# Patient Record
Sex: FEMALE | Race: BLACK OR AFRICAN AMERICAN | NOT HISPANIC OR LATINO | Employment: FULL TIME | URBAN - METROPOLITAN AREA
[De-identification: names, ages, dates, MRNs, and addresses within clinical notes are randomized per-mention and may not be internally consistent; named-entity substitution may affect disease eponyms.]

---

## 2017-01-04 ENCOUNTER — TELEPHONE (OUTPATIENT)
Dept: RHEUMATOLOGY | Facility: CLINIC | Age: 52
End: 2017-01-04

## 2017-01-04 NOTE — TELEPHONE ENCOUNTER
Second attempt to contact patient in regards to lab results. Left message to contact office for results.

## 2017-01-04 NOTE — TELEPHONE ENCOUNTER
----- Message from Markell Gonzalez sent at 1/4/2017  9:57 AM CST -----  Contact: Patient  Patient returning call.please call back at [phone]. thanks

## 2017-01-05 ENCOUNTER — TELEPHONE (OUTPATIENT)
Dept: RHEUMATOLOGY | Facility: CLINIC | Age: 52
End: 2017-01-05

## 2017-01-05 NOTE — TELEPHONE ENCOUNTER
Received one lab test result from [facility]. Mail patient other lab orders that were supposed to be done. Patient to get them done before February appointment.

## 2017-01-05 NOTE — TELEPHONE ENCOUNTER
----- Message from Agapito Bal sent at 1/4/2017  4:23 PM CST -----  Contact: same  Unsuccessful call placed to pod.  Patient called in and was returning a call.  Patient call back number is [phone]

## 2017-02-08 ENCOUNTER — OFFICE VISIT (OUTPATIENT)
Dept: RHEUMATOLOGY | Facility: CLINIC | Age: 52
End: 2017-02-08
Payer: COMMERCIAL

## 2017-02-08 VITALS
SYSTOLIC BLOOD PRESSURE: 138 MMHG | DIASTOLIC BLOOD PRESSURE: 80 MMHG | HEART RATE: 84 BPM | WEIGHT: 264.56 LBS | BODY MASS INDEX: 42.52 KG/M2 | HEIGHT: 66 IN

## 2017-02-08 DIAGNOSIS — M02.30 REITER'S DISEASE, REITER'S DISEASE OF UNSPECIFIED SITE: ICD-10-CM

## 2017-02-08 DIAGNOSIS — M35.00 SJOGREN'S SYNDROME: Primary | ICD-10-CM

## 2017-02-08 DIAGNOSIS — M25.572 BILATERAL ANKLE PAIN, UNSPECIFIED CHRONICITY: ICD-10-CM

## 2017-02-08 DIAGNOSIS — G60.3 IDIOPATHIC PROGRESSIVE NEUROPATHY: ICD-10-CM

## 2017-02-08 DIAGNOSIS — M79.672 FOOT PAIN, BILATERAL: ICD-10-CM

## 2017-02-08 DIAGNOSIS — M79.671 FOOT PAIN, BILATERAL: ICD-10-CM

## 2017-02-08 DIAGNOSIS — M25.571 BILATERAL ANKLE PAIN, UNSPECIFIED CHRONICITY: ICD-10-CM

## 2017-02-08 DIAGNOSIS — G47.26 SHIFT WORK SLEEP DISORDER: ICD-10-CM

## 2017-02-08 PROCEDURE — 99214 OFFICE O/P EST MOD 30 MIN: CPT | Mod: 25,S$GLB,, | Performed by: INTERNAL MEDICINE

## 2017-02-08 PROCEDURE — 96372 THER/PROPH/DIAG INJ SC/IM: CPT | Mod: S$GLB,,, | Performed by: INTERNAL MEDICINE

## 2017-02-08 PROCEDURE — 99999 PR PBB SHADOW E&M-EST. PATIENT-LVL III: CPT | Mod: PBBFAC,,, | Performed by: INTERNAL MEDICINE

## 2017-02-08 RX ORDER — METHYLPREDNISOLONE ACETATE 80 MG/ML
160 INJECTION, SUSPENSION INTRA-ARTICULAR; INTRALESIONAL; INTRAMUSCULAR; SOFT TISSUE
Status: COMPLETED | OUTPATIENT
Start: 2017-02-08 | End: 2017-02-08

## 2017-02-08 RX ORDER — MODAFINIL 200 MG/1
200 TABLET ORAL DAILY
Qty: 30 TABLET | Refills: 3 | Status: SHIPPED | OUTPATIENT
Start: 2017-02-08 | End: 2017-03-10

## 2017-02-08 RX ORDER — CYANOCOBALAMIN 1000 UG/ML
1000 INJECTION, SOLUTION INTRAMUSCULAR; SUBCUTANEOUS
Status: COMPLETED | OUTPATIENT
Start: 2017-02-08 | End: 2017-02-08

## 2017-02-08 RX ORDER — HYDROXYCHLOROQUINE SULFATE 200 MG/1
200 TABLET, FILM COATED ORAL 2 TIMES DAILY
COMMUNITY
End: 2017-06-23

## 2017-02-08 RX ORDER — KETOROLAC TROMETHAMINE 30 MG/ML
60 INJECTION, SOLUTION INTRAMUSCULAR; INTRAVENOUS
Status: COMPLETED | OUTPATIENT
Start: 2017-02-08 | End: 2017-02-08

## 2017-02-08 RX ADMIN — CYANOCOBALAMIN 1000 MCG: 1000 INJECTION, SOLUTION INTRAMUSCULAR; SUBCUTANEOUS at 03:02

## 2017-02-08 RX ADMIN — KETOROLAC TROMETHAMINE 60 MG: 30 INJECTION, SOLUTION INTRAMUSCULAR; INTRAVENOUS at 03:02

## 2017-02-08 RX ADMIN — METHYLPREDNISOLONE ACETATE 160 MG: 80 INJECTION, SUSPENSION INTRA-ARTICULAR; INTRALESIONAL; INTRAMUSCULAR; SOFT TISSUE at 03:02

## 2017-02-08 ASSESSMENT — ROUTINE ASSESSMENT OF PATIENT INDEX DATA (RAPID3)
PATIENT GLOBAL ASSESSMENT SCORE: 5
WHEN YOU AWAKENED IN THE MORNING OVER THE LAST WEEK, PLEASE INDICATE THE AMOUNT OF TIME IT TAKES UNTIL YOU ARE AS LIMBER AS YOU WILL BE FOR THE DAY: HOUR
PSYCHOLOGICAL DISTRESS SCORE: 0
AM STIFFNESS SCORE: 1, YES
TOTAL RAPID3 SCORE: 4.44
PAIN SCORE: 5
FATIGUE SCORE: 10
MDHAQ FUNCTION SCORE: 1

## 2017-02-08 NOTE — PROGRESS NOTES
Subjective:       Patient ID: Mack Melton is a 52 y.o. female.    Chief Complaint: Disease Management    HPI Comments: Follow up: she has Sjogren's disease now has fatigue, and numbness hands and feet. Patient complains of arthralgias and myalgias for which has been present for a few years. Pain is located in multiple joints, both shoulder(s), both elbow(s), both wrist(s), both MCP(s): 1st, 2nd, 3rd, 4th and 5th, both PIP(s): 1st, 2nd, 3rd, 4th and 5th, both DIP(s): 1st and 2nd, both hip(s), both knee(s) and both MTP(s): 1st, 2nd, 3rd, 4th and 5th, is described as aching, pulsating, shooting and throbbing, and is constant, moderate .  Associated symptoms include: crepitation, decreased range of motion, edema, effusion, tenderness and warmth.   She notes tingling in her extremities          Sjögren's Syndrome    The disease course has been worsening.     She complains of joint swelling. Associated symptoms include fatigue.             Sjögren's Syndrome        She complains of joint swelling. Associated symptoms include fatigue.         Review of Systems   Constitutional: Positive for activity change, chills and fatigue. Negative for appetite change, diaphoresis and unexpected weight change.   HENT: Negative for congestion, dental problem, ear discharge, ear pain, facial swelling, mouth sores, nosebleeds, postnasal drip, rhinorrhea, sinus pressure, sneezing, sore throat, tinnitus and voice change.    Eyes: Negative for photophobia, pain, discharge, redness and itching.   Respiratory: Negative for apnea, cough, chest tightness, shortness of breath and wheezing.    Cardiovascular: Positive for leg swelling. Negative for chest pain and palpitations.   Gastrointestinal: Positive for abdominal pain. Negative for abdominal distention, constipation, diarrhea, nausea and vomiting.   Endocrine: Negative for cold intolerance, heat intolerance, polydipsia and polyuria.   Genitourinary: Negative for decreased urine volume,  "difficulty urinating, flank pain, frequency, hematuria and urgency.   Musculoskeletal: Positive for arthralgias, back pain, gait problem, joint swelling, neck pain and neck stiffness.   Skin: Negative for pallor, rash and wound.   Allergic/Immunologic: Negative for immunocompromised state.   Neurological: Positive for weakness. Negative for dizziness, tremors and numbness.   Hematological: Negative for adenopathy. Does not bruise/bleed easily.   Psychiatric/Behavioral: Negative for sleep disturbance. The patient is not nervous/anxious.          Objective:     Visit Vitals    /80    Pulse 84    Ht 5' 6" (1.676 m)    Wt 120 kg (264 lb 8.8 oz)    BMI 42.7 kg/m2        Physical Exam   Vitals reviewed.  Constitutional: She is oriented to person, place, and time. She appears distressed.   HENT:   Head: Normocephalic and atraumatic.   Eyes: EOM are normal. Pupils are equal, round, and reactive to light. Right eye exhibits no discharge. Left eye exhibits no discharge.   Neck: Neck supple. No thyromegaly present.   Cardiovascular: Normal rate, regular rhythm and normal heart sounds.  Exam reveals no gallop and no friction rub.    No murmur heard.  Pulmonary/Chest: Breath sounds normal. She has no wheezes. She has no rales. She exhibits no tenderness.   Abdominal: There is no tenderness. There is no rebound and no guarding.       Right Side Rheumatological Exam     Examination finds the elbow normal.    The patient is tender to palpation of the shoulder, wrist, knee, 1st PIP, 1st MCP, 2nd PIP, 2nd MCP, 3rd PIP, 3rd MCP, 4th PIP, 4th MCP, 5th PIP and 5th MCP    She has swelling of the 1st PIP, 1st MCP, 2nd PIP, 2nd MCP, 3rd PIP, 3rd MCP, 4th PIP, 4th MCP, 5th PIP and 5th MCP    Shoulder Exam   Tenderness Location: no tenderness    Range of Motion   Active Abduction: abnormal   Adduction: abnormal  Sensation: normal    Knee Exam   Patellofemoral Crepitus: positive  Effusion: positive  Sensation: normal    Hip Exam "   Tenderness Location: posterior  Sensation: normal    Elbow/Wrist Exam   Tenderness Location: no tenderness  Sensation: normal    Left Side Rheumatological Exam     The patient is tender to palpation of the shoulder, elbow, wrist, knee, 1st PIP, 1st MCP, 2nd PIP, 2nd MCP, 3rd PIP, 3rd MCP, 4th PIP, 4th MCP, 5th PIP and 5th MCP.    She has swelling of the 1st PIP, 1st MCP, 2nd PIP, 2nd MCP, 3rd PIP, 3rd MCP, 4th PIP, 4th MCP, 5th PIP and 5th MCP    Shoulder Exam   Tenderness Location: no tenderness    Range of Motion   Active Abduction: abnormal   Sensation: normal    Knee Exam     Patellofemoral Crepitus: positive  Effusion: positive  Sensation: normal    Hip Exam   Tenderness Location: posterior  Sensation: normal    Elbow/Wrist Exam   Sensation: normal      Back/Neck Exam   General Inspection   Gait: normal         Lymphadenopathy:     She has no cervical adenopathy.   Neurological: She is alert and oriented to person, place, and time. Gait normal.   Skin: Skin is dry. No rash noted. No erythema. There is pallor.     Psychiatric: Mood and affect normal.   Musculoskeletal: She exhibits edema, tenderness and deformity.         cbc, cmp, thyroid panel wnl  Assessment:       1. Sjogren's syndrome    2. Jake's disease, Jake's disease of unspecified site    3. Idiopathic progressive neuropathy    4. Bilateral ankle pain, unspecified chronicity    5. Foot pain, bilateral    6. Shift work sleep disorder            Plan:     Mack was seen today for disease management.    Diagnoses and all orders for this visit:    Sjogren's syndrome  -     X-Ray Foot Complete Bilateral; Future  -     X-Ray Ankle Complete Bilateral; Future  -     X-Ray Foot Complete Bilateral  -     X-Ray Ankle Complete Bilateral  -     modafinil (PROVIGIL) 200 MG Tab; Take 1 tablet (200 mg total) by mouth once daily.  -     CBC auto differential; Future  -     Protein electrophoresis, serum; Future  -     CBC auto differential  -     Protein  electrophoresis, serum  -     methylPREDNISolone acetate injection 160 mg; Inject 2 mLs (160 mg total) into the muscle one time.  -     ketorolac injection 60 mg; Inject 2 mLs (60 mg total) into the muscle one time.  -     cyanocobalamin injection 1,000 mcg; Inject 1 mL (1,000 mcg total) into the muscle one time.  -     Comprehensive metabolic panel; Future  -     CBC auto differential; Future  -     C-reactive protein; Future  -     Sedimentation rate, manual; Future  -     SIENA; Future  -     Anti Sm/RNP Antibody; Future  -     Anti-DNA antibody, double-stranded; Future  -     Anti-scleroderma antibody; Future  -     Anti-Smith antibody; Future  -     Sjogrens syndrome-A extractable nuclear antibody; Future  -     Sjogrens syndrome-B extractable nuclear antibody; Future  -     Comprehensive metabolic panel  -     CBC auto differential  -     C-reactive protein  -     Sedimentation rate, manual  -     SIENA  -     Anti Sm/RNP Antibody  -     Anti-DNA antibody, double-stranded  -     Anti-scleroderma antibody  -     Anti-Smith antibody  -     Sjogrens syndrome-A extractable nuclear antibody  -     Sjogrens syndrome-B extractable nuclear antibody    Jake's disease, Jake's disease of unspecified site  -     X-Ray Foot Complete Bilateral; Future  -     X-Ray Ankle Complete Bilateral; Future  -     X-Ray Foot Complete Bilateral  -     X-Ray Ankle Complete Bilateral  -     modafinil (PROVIGIL) 200 MG Tab; Take 1 tablet (200 mg total) by mouth once daily.  -     CBC auto differential; Future  -     Protein electrophoresis, serum; Future  -     CBC auto differential  -     Protein electrophoresis, serum  -     methylPREDNISolone acetate injection 160 mg; Inject 2 mLs (160 mg total) into the muscle one time.  -     ketorolac injection 60 mg; Inject 2 mLs (60 mg total) into the muscle one time.  -     cyanocobalamin injection 1,000 mcg; Inject 1 mL (1,000 mcg total) into the muscle one time.  -     Comprehensive metabolic  panel; Future  -     CBC auto differential; Future  -     C-reactive protein; Future  -     Sedimentation rate, manual; Future  -     SIENA; Future  -     Anti Sm/RNP Antibody; Future  -     Anti-DNA antibody, double-stranded; Future  -     Anti-scleroderma antibody; Future  -     Anti-Smith antibody; Future  -     Sjogrens syndrome-A extractable nuclear antibody; Future  -     Sjogrens syndrome-B extractable nuclear antibody; Future  -     Comprehensive metabolic panel  -     CBC auto differential  -     C-reactive protein  -     Sedimentation rate, manual  -     SIENA  -     Anti Sm/RNP Antibody  -     Anti-DNA antibody, double-stranded  -     Anti-scleroderma antibody  -     Anti-Smith antibody  -     Sjogrens syndrome-A extractable nuclear antibody  -     Sjogrens syndrome-B extractable nuclear antibody    Idiopathic progressive neuropathy  -     X-Ray Foot Complete Bilateral; Future  -     X-Ray Ankle Complete Bilateral; Future  -     X-Ray Foot Complete Bilateral  -     X-Ray Ankle Complete Bilateral  -     modafinil (PROVIGIL) 200 MG Tab; Take 1 tablet (200 mg total) by mouth once daily.  -     CBC auto differential; Future  -     Protein electrophoresis, serum; Future  -     CBC auto differential  -     Protein electrophoresis, serum  -     methylPREDNISolone acetate injection 160 mg; Inject 2 mLs (160 mg total) into the muscle one time.  -     ketorolac injection 60 mg; Inject 2 mLs (60 mg total) into the muscle one time.  -     cyanocobalamin injection 1,000 mcg; Inject 1 mL (1,000 mcg total) into the muscle one time.  -     Comprehensive metabolic panel; Future  -     CBC auto differential; Future  -     C-reactive protein; Future  -     Sedimentation rate, manual; Future  -     SIENA; Future  -     Anti Sm/RNP Antibody; Future  -     Anti-DNA antibody, double-stranded; Future  -     Anti-scleroderma antibody; Future  -     Anti-Smith antibody; Future  -     Sjogrens syndrome-A extractable nuclear antibody;  Future  -     Sjogrens syndrome-B extractable nuclear antibody; Future  -     Comprehensive metabolic panel  -     CBC auto differential  -     C-reactive protein  -     Sedimentation rate, manual  -     SIENA  -     Anti Sm/RNP Antibody  -     Anti-DNA antibody, double-stranded  -     Anti-scleroderma antibody  -     Anti-Smith antibody  -     Sjogrens syndrome-A extractable nuclear antibody  -     Sjogrens syndrome-B extractable nuclear antibody    Bilateral ankle pain, unspecified chronicity  -     X-Ray Foot Complete Bilateral; Future  -     X-Ray Ankle Complete Bilateral; Future  -     X-Ray Foot Complete Bilateral  -     X-Ray Ankle Complete Bilateral  -     modafinil (PROVIGIL) 200 MG Tab; Take 1 tablet (200 mg total) by mouth once daily.  -     CBC auto differential; Future  -     Protein electrophoresis, serum; Future  -     CBC auto differential  -     Protein electrophoresis, serum  -     methylPREDNISolone acetate injection 160 mg; Inject 2 mLs (160 mg total) into the muscle one time.  -     ketorolac injection 60 mg; Inject 2 mLs (60 mg total) into the muscle one time.  -     cyanocobalamin injection 1,000 mcg; Inject 1 mL (1,000 mcg total) into the muscle one time.  -     Comprehensive metabolic panel; Future  -     CBC auto differential; Future  -     C-reactive protein; Future  -     Sedimentation rate, manual; Future  -     SIENA; Future  -     Anti Sm/RNP Antibody; Future  -     Anti-DNA antibody, double-stranded; Future  -     Anti-scleroderma antibody; Future  -     Anti-Smith antibody; Future  -     Sjogrens syndrome-A extractable nuclear antibody; Future  -     Sjogrens syndrome-B extractable nuclear antibody; Future  -     Comprehensive metabolic panel  -     CBC auto differential  -     C-reactive protein  -     Sedimentation rate, manual  -     SIENA  -     Anti Sm/RNP Antibody  -     Anti-DNA antibody, double-stranded  -     Anti-scleroderma antibody  -     Anti-Smith antibody  -     Sjogrens  syndrome-A extractable nuclear antibody  -     Sjogrens syndrome-B extractable nuclear antibody    Foot pain, bilateral  -     X-Ray Foot Complete Bilateral; Future  -     X-Ray Ankle Complete Bilateral; Future  -     X-Ray Foot Complete Bilateral  -     X-Ray Ankle Complete Bilateral  -     modafinil (PROVIGIL) 200 MG Tab; Take 1 tablet (200 mg total) by mouth once daily.  -     CBC auto differential; Future  -     Protein electrophoresis, serum; Future  -     CBC auto differential  -     Protein electrophoresis, serum  -     methylPREDNISolone acetate injection 160 mg; Inject 2 mLs (160 mg total) into the muscle one time.  -     ketorolac injection 60 mg; Inject 2 mLs (60 mg total) into the muscle one time.  -     cyanocobalamin injection 1,000 mcg; Inject 1 mL (1,000 mcg total) into the muscle one time.  -     Comprehensive metabolic panel; Future  -     CBC auto differential; Future  -     C-reactive protein; Future  -     Sedimentation rate, manual; Future  -     SIENA; Future  -     Anti Sm/RNP Antibody; Future  -     Anti-DNA antibody, double-stranded; Future  -     Anti-scleroderma antibody; Future  -     Anti-Smith antibody; Future  -     Sjogrens syndrome-A extractable nuclear antibody; Future  -     Sjogrens syndrome-B extractable nuclear antibody; Future  -     Comprehensive metabolic panel  -     CBC auto differential  -     C-reactive protein  -     Sedimentation rate, manual  -     SIENA  -     Anti Sm/RNP Antibody  -     Anti-DNA antibody, double-stranded  -     Anti-scleroderma antibody  -     Anti-Smith antibody  -     Sjogrens syndrome-A extractable nuclear antibody  -     Sjogrens syndrome-B extractable nuclear antibody    Shift work sleep disorder  -     modafinil (PROVIGIL) 200 MG Tab; Take 1 tablet (200 mg total) by mouth once daily.  -     CBC auto differential; Future  -     Protein electrophoresis, serum; Future  -     CBC auto differential  -     Protein electrophoresis, serum  -      methylPREDNISolone acetate injection 160 mg; Inject 2 mLs (160 mg total) into the muscle one time.  -     ketorolac injection 60 mg; Inject 2 mLs (60 mg total) into the muscle one time.  -     cyanocobalamin injection 1,000 mcg; Inject 1 mL (1,000 mcg total) into the muscle one time.  -     Comprehensive metabolic panel; Future  -     CBC auto differential; Future  -     C-reactive protein; Future  -     Sedimentation rate, manual; Future  -     SIENA; Future  -     Anti Sm/RNP Antibody; Future  -     Anti-DNA antibody, double-stranded; Future  -     Anti-scleroderma antibody; Future  -     Anti-Smith antibody; Future  -     Sjogrens syndrome-A extractable nuclear antibody; Future  -     Sjogrens syndrome-B extractable nuclear antibody; Future  -     Comprehensive metabolic panel  -     CBC auto differential  -     C-reactive protein  -     Sedimentation rate, manual  -     SIENA  -     Anti Sm/RNP Antibody  -     Anti-DNA antibody, double-stranded  -     Anti-scleroderma antibody  -     Anti-Smith antibody  -     Sjogrens syndrome-A extractable nuclear antibody  -     Sjogrens syndrome-B extractable nuclear antibody

## 2017-02-08 NOTE — MR AVS SNAPSHOT
[facility]  [address]  [facility] [address]  Phone: [phone]  Fax: [phone]                  Bridal Walls   2017 3:30 PM   Office Visit    Description:  Female : 1965   Provider:  Rodo Harding MD   Department:  [facility] - Rhe[facility]ology           Reason for Visit     Disease Management           Diagnoses this Visit        Comments    Sjogren's syndrome    -  Primary     Jake's disease, Jake's disease of unspecified site         Idiopathic progressive neuropathy         Bilateral ankle pain, unspecified chronicity         Foot pain, bilateral         Shift work sleep disorder                To Do List           Future Appointments        Provider Department Dept Phone    2017 4:30 PM Rodo Harding MD [facility] [phone]      Goals (5 Years of Data)     None      Follow-Up and Disposition     Return in about 4 months (around 2017).       These Medications        Disp Refills Start End    modafinil (PROVIGIL) 200 MG Tab 30 tablet 3 2017 3/10/2017    Take 1 tablet (200 mg total) by mouth once daily. - Oral    Pharmacy: [id number] - [place], [place] - [address] #: [phone]         [facility]     [facility] Nurse Care Line -  Assistance  Registered nurses in the [facility] provide clinical advisement, health education, appointment booking, and other advisory services.  Call for this free service at [phone].             Medications           Message regarding Medications     Verify the changes and/or additions to your medication regime listed below are the same as discussed with your clinician today.  If any of these changes or additions are incorrect, please notify your healthcare provider.        START taking these NEW medications        Refills    modafinil (PROVIGIL) 200 MG Tab 3    Sig: Take 1 tablet (200 mg total) by mouth once daily.    Class: Normal    Route: Oral      These  medications were administered today        Dose Freq    methylPREDNISolone acetate injection 160 mg 160 mg Clinic/HOD 1 time    Sig: Inject 2 mLs (160 mg total) into the muscle one time.    Class: Normal    Route: Intramuscular    ketorolac injection 60 mg 60 mg Clinic/HOD 1 time    Sig: Inject 2 mLs (60 mg total) into the muscle one time.    Class: Normal    Route: Intramuscular    cyanocobalamin injection 1,000 mcg 1,000 mcg Clinic/HOD 1 time    Sig: Inject 1 mL (1,000 mcg total) into the muscle one time.    Class: Normal    Route: Intramuscular           Verify that the below list of medications is an accurate representation of the medications you are currently taking.  If none reported, the list may be blank. If incorrect, please contact your healthcare provider. Carry this list with you in case of emergency.           Current Medications     CALCIUM CARBONATE-VITAMIN D3 ORAL Take by mouth.    cholecalciferol, vitamin D3, 5,000 unit Tab Take by mouth.    cyanocobalamin (VITAMIN B-12) 1,000 mcg/mL injection Inject 1 mL (1,000 mcg total) into the skin once a week.    esomeprazole (NEXIUM) 40 MG capsule Take 40 mg by mouth.    estrogen, conjugated,-medroxyprogesterone (PREMPRO) 0.625-5 mg per tablet Take 1 tablet by mouth.    fish oil-omega-3 fatty acids 300-1,000 mg capsule Take 500 mg by mouth 2 (two) times daily.    folic acid (FOLVITE) 1 MG tablet Take 1 mg by mouth.    hydroxychloroquine (PLAQUENIL) 200 mg tablet Take 200 mg by mouth 2 (two) times daily.    liothyronine (CYTOMEL) 5 MCG Tab TAKE ONE TABLET BY MOUTH TWICE DAILY    metformin (GLUCOPHAGE-XR) 500 MG 24 hr tablet Take 500 mg by mouth.    piroxicam (FELDENE) 20 MG capsule Take 1 capsule (20 mg total) by mouth once daily.    temazepam (RESTORIL) 15 mg Cap Take 15 mg by mouth.    terazosin (HYTRIN) 2 MG capsule Take 2 mg by mouth.    tizanidine (ZANAFLEX) 4 MG tablet Take 1 tablet (4 mg total) by mouth every 8 (eight) hours.    zolpidem (AMBIEN) 10 mg  "Tab Take 10 mg by mouth.    levothyroxine (SYNTHROID) 137 MCG Tab tablet Take 137 mcg by mouth.    modafinil (PROVIGIL) 200 MG Tab Take 1 tablet (200 mg total) by mouth once daily.           Clinical Reference Information           Your Vitals Were     BP Pulse Height Weight BMI    138/80 84 5' 6" (1.676 m) 120 kg (264 lb 8.8 oz) 42.7 kg/m2      Blood Pressure          Most Recent Value    BP  138/80      Allergies as of 2/8/2017     No Known Allergies      Immunizations Administered on Date of Encounter - 2/8/2017     None      Orders Placed During Today's Visit      Normal Orders This Visit    SIENA     Anti Sm/RNP Antibody     Anti-DNA antibody, double-stranded     Anti-scleroderma antibody     Anti-Day antibody     C-reactive protein     CBC auto differential     CBC auto differential     Comprehensive metabolic panel     Protein electrophoresis, serum     Sedimentation rate, manual     Sjogrens syndrome-A extractable nuclear antibody     Sjogrens syndrome-B extractable nuclear antibody     X-Ray Ankle Complete Bilateral     X-Ray Foot Complete Bilateral     Future Labs/Procedures Expected by Expires    SIENA  2/8/2017 4/9/2018    Anti Sm/RNP Antibody  2/8/2017 4/9/2018    Anti-DNA antibody, double-stranded  2/8/2017 4/9/2018    Anti-scleroderma antibody  2/8/2017 4/9/2018    Anti-Smith antibody  2/8/2017 4/9/2018    C-reactive protein  2/8/2017 4/9/2018    CBC auto differential  2/8/2017 4/9/2018    CBC auto differential  2/8/2017 4/9/2018    Comprehensive metabolic panel  2/8/2017 4/9/2018    Protein electrophoresis, serum  2/8/2017 4/9/2018    Sedimentation rate, manual  2/8/2017 4/9/2018    Sjogrens syndrome-A extractable nuclear antibody  2/8/2017 4/9/2018    Sjogrens syndrome-B extractable nuclear antibody  2/8/2017 4/9/2018    X-Ray Ankle Complete Bilateral  2/8/2017 2/8/2018    X-Ray Foot Complete Bilateral  2/8/2017 2/8/2018      Language Assistance Services     ATTENTION: Language assistance services are " available, free of charge. Please call [phone].      ATENCIÓN: Si habla jovani, tiene a alfred disposición servicios gratuitos de asistencia lingüística. Llame al [phone].     CHÚ Ý: N?u b?n nói Ti?ng Vi?t, có các d?ch v? h? tr? ngôn ng? mi?n phí dành cho b?n. G?i s? [phone].         [facility] complies with applicable Federal civil rights laws and does not discriminate on the basis of race, color, national origin, age, disability, or sex.

## 2017-02-13 PROBLEM — G60.3 IDIOPATHIC PROGRESSIVE NEUROPATHY: Status: ACTIVE | Noted: 2017-02-13

## 2017-02-13 PROBLEM — M35.00 SJOGREN'S SYNDROME: Status: ACTIVE | Noted: 2017-02-13

## 2017-05-26 ENCOUNTER — TELEPHONE (OUTPATIENT)
Dept: RHEUMATOLOGY | Facility: CLINIC | Age: 52
End: 2017-05-26

## 2017-06-23 ENCOUNTER — OFFICE VISIT (OUTPATIENT)
Dept: RHEUMATOLOGY | Facility: CLINIC | Age: 52
End: 2017-06-23
Payer: COMMERCIAL

## 2017-06-23 VITALS
WEIGHT: 264 LBS | HEART RATE: 65 BPM | SYSTOLIC BLOOD PRESSURE: 174 MMHG | HEIGHT: 66 IN | BODY MASS INDEX: 42.43 KG/M2 | DIASTOLIC BLOOD PRESSURE: 83 MMHG

## 2017-06-23 DIAGNOSIS — D69.6 THROMBOCYTOPENIA: ICD-10-CM

## 2017-06-23 DIAGNOSIS — M35.00 SJOGREN'S SYNDROME, WITH UNSPECIFIED ORGAN INVOLVEMENT: Primary | ICD-10-CM

## 2017-06-23 DIAGNOSIS — G60.3 IDIOPATHIC PROGRESSIVE NEUROPATHY: ICD-10-CM

## 2017-06-23 DIAGNOSIS — M02.30 REITER'S DISEASE, REITER'S DISEASE OF UNSPECIFIED SITE: ICD-10-CM

## 2017-06-23 PROCEDURE — 96372 THER/PROPH/DIAG INJ SC/IM: CPT | Mod: S$GLB,,, | Performed by: INTERNAL MEDICINE

## 2017-06-23 PROCEDURE — 99999 PR PBB SHADOW E&M-EST. PATIENT-LVL II: CPT | Mod: PBBFAC,,, | Performed by: INTERNAL MEDICINE

## 2017-06-23 PROCEDURE — 99214 OFFICE O/P EST MOD 30 MIN: CPT | Mod: 25,S$GLB,, | Performed by: INTERNAL MEDICINE

## 2017-06-23 RX ORDER — HYDROXYCHLOROQUINE SULFATE 200 MG/1
200 TABLET, FILM COATED ORAL 2 TIMES DAILY
Qty: 60 TABLET | Refills: 11 | Status: SHIPPED | OUTPATIENT
Start: 2017-06-23 | End: 2018-04-26 | Stop reason: SDUPTHER

## 2017-06-23 RX ORDER — DEXTROAMPHETAMINE SACCHARATE, AMPHETAMINE ASPARTATE, DEXTROAMPHETAMINE SULFATE AND AMPHETAMINE SULFATE 5; 5; 5; 5 MG/1; MG/1; MG/1; MG/1
TABLET ORAL
Refills: 0 | COMMUNITY
Start: 2017-05-31 | End: 2019-08-20

## 2017-06-23 RX ORDER — METHYLPREDNISOLONE ACETATE 80 MG/ML
160 INJECTION, SUSPENSION INTRA-ARTICULAR; INTRALESIONAL; INTRAMUSCULAR; SOFT TISSUE
Status: COMPLETED | OUTPATIENT
Start: 2017-06-23 | End: 2017-06-23

## 2017-06-23 RX ORDER — TIZANIDINE 4 MG/1
4 TABLET ORAL EVERY 8 HOURS
Qty: 90 TABLET | Refills: 5 | Status: SHIPPED | OUTPATIENT
Start: 2017-06-23 | End: 2017-10-27 | Stop reason: SDUPTHER

## 2017-06-23 RX ORDER — KETOROLAC TROMETHAMINE 30 MG/ML
60 INJECTION, SOLUTION INTRAMUSCULAR; INTRAVENOUS
Status: COMPLETED | OUTPATIENT
Start: 2017-06-23 | End: 2017-06-23

## 2017-06-23 RX ORDER — CYANOCOBALAMIN 1000 UG/ML
1000 INJECTION, SOLUTION INTRAMUSCULAR; SUBCUTANEOUS
Status: COMPLETED | OUTPATIENT
Start: 2017-06-23 | End: 2017-06-23

## 2017-06-23 RX ORDER — MODAFINIL 200 MG/1
200 TABLET ORAL DAILY
COMMUNITY
End: 2017-09-22 | Stop reason: SDUPTHER

## 2017-06-23 RX ADMIN — CYANOCOBALAMIN 1000 MCG: 1000 INJECTION, SOLUTION INTRAMUSCULAR; SUBCUTANEOUS at 06:06

## 2017-06-23 RX ADMIN — METHYLPREDNISOLONE ACETATE 160 MG: 80 INJECTION, SUSPENSION INTRA-ARTICULAR; INTRALESIONAL; INTRAMUSCULAR; SOFT TISSUE at 06:06

## 2017-06-23 RX ADMIN — KETOROLAC TROMETHAMINE 60 MG: 30 INJECTION, SOLUTION INTRAMUSCULAR; INTRAVENOUS at 06:06

## 2017-06-23 NOTE — PROGRESS NOTES
Administered 1 cc Vitamin B12 1000mcg/cc to left ventrogluteal. Pt tolerated well. No acute reaction noted to site. Pt instructed on S/S to report. Advised patient to wait in lobby 15 minutes after receiving injection to monitor for any reactions. Pt verbalized understanding.     Lot: [id number]  Exp: [id number]  Administered 2 cc DepoMedrol 80mg/cc  to left ventrogluteal. Pt tolerated well. No acute reaction noted to site. Pt instructed on S/S to report. Advised patient to wait in lobby 15 minutes after receiving injection to monitor for any reactions..  Pt verbalized understanding.     Lot: [id number]  Exp: 06/2018  Administered 2 cc  Toradol 30mg/cc  to right ventrogluteal. Pt tolerated well. No acute reaction noted to site. Pt instructed on S/S to report. Advised patient to wait in lobby 15 minutes after receiving injection to monitor for any reactions. Pt verbalized understanding.     Lot: [id number]  Exp: [id number]

## 2017-06-23 NOTE — PROGRESS NOTES
Subjective:       Patient ID: Mack Melton is a 52 y.o. female.    Chief Complaint: 4 month follow up    Follow up:  sjogren's off plaquenil due to pyelonephritis and was admitted and platelets 45, and now hurting. Patient complains of arthralgias and myalgias for which has been present for a few years. Pain is located in multiple joints, both shoulder(s), both elbow(s), both wrist(s), both MCP(s): 1st, 2nd, 3rd, 4th and 5th, both PIP(s): 1st, 2nd, 3rd, 4th and 5th, both DIP(s): 1st and 2nd, both hip(s), both knee(s) and both MTP(s): 1st, 2nd, 3rd, 4th and 5th, is described as aching, pulsating, shooting and throbbing, and is constant, moderate .       Review of Systems   Constitutional: Positive for activity change and chills. Negative for appetite change, diaphoresis and unexpected weight change.   HENT: Negative for congestion, dental problem, ear discharge, ear pain, facial swelling, mouth sores, nosebleeds, postnasal drip, rhinorrhea, sinus pressure, sneezing, sore throat, tinnitus and voice change.    Eyes: Negative for photophobia, pain, discharge, redness and itching.   Respiratory: Negative for apnea, cough, chest tightness, shortness of breath and wheezing.    Cardiovascular: Positive for leg swelling. Negative for chest pain and palpitations.   Gastrointestinal: Positive for abdominal pain. Negative for abdominal distention, constipation, diarrhea, nausea and vomiting.   Endocrine: Negative for cold intolerance, heat intolerance, polydipsia and polyuria.   Genitourinary: Negative for decreased urine volume, difficulty urinating, flank pain, frequency, hematuria and urgency.   Musculoskeletal: Positive for arthralgias, back pain, gait problem, neck pain and neck stiffness.   Skin: Negative for pallor, rash and wound.   Allergic/Immunologic: Negative for immunocompromised state.   Neurological: Positive for weakness. Negative for dizziness, tremors and numbness.   Hematological: Negative for adenopathy.  "Does not bruise/bleed easily.   Psychiatric/Behavioral: Negative for sleep disturbance. The patient is not nervous/anxious.          Objective:     BP (!) 174/83   Pulse 65   Ht 5' 6" (1.676 m)   Wt 119.7 kg (264 lb)   BMI 42.61 kg/m²      Physical Exam   Vitals reviewed.  Constitutional: She is oriented to person, place, and time. No distress.   HENT:   Head: Normocephalic and atraumatic.   Eyes: EOM are normal. Pupils are equal, round, and reactive to light. Right eye exhibits no discharge. Left eye exhibits no discharge.   Neck: Neck supple. No thyromegaly present.   Cardiovascular: Normal rate, regular rhythm and normal heart sounds.  Exam reveals no gallop and no friction rub.    No murmur heard.  Pulmonary/Chest: Breath sounds normal. She has no wheezes. She has no rales. She exhibits no tenderness.   Abdominal: There is no tenderness. There is no rebound and no guarding.       Right Side Rheumatological Exam     Examination finds the elbow normal.    The patient is tender to palpation of the shoulder, wrist, knee, 1st PIP, 1st MCP, 2nd PIP, 2nd MCP, 3rd PIP, 3rd MCP, 4th PIP, 4th MCP, 5th PIP and 5th MCP    She has swelling of the 1st PIP, 1st MCP, 2nd PIP, 2nd MCP, 3rd PIP, 3rd MCP, 4th PIP, 4th MCP, 5th PIP and 5th MCP    Shoulder Exam   Tenderness Location: no tenderness    Range of Motion   Active Abduction: abnormal   Adduction: abnormal  Sensation: normal    Knee Exam   Patellofemoral Crepitus: positive  Effusion: positive  Sensation: normal    Hip Exam   Tenderness Location: posterior  Sensation: normal    Elbow/Wrist Exam   Tenderness Location: no tenderness  Sensation: normal    Left Side Rheumatological Exam     The patient is tender to palpation of the shoulder, elbow, wrist, knee, 1st PIP, 1st MCP, 2nd PIP, 2nd MCP, 3rd PIP, 3rd MCP, 4th PIP, 4th MCP, 5th PIP and 5th MCP.    She has swelling of the 1st PIP, 1st MCP, 2nd PIP, 2nd MCP, 3rd PIP, 3rd MCP, 4th PIP, 4th MCP, 5th PIP and 5th " MCP    Shoulder Exam   Tenderness Location: no tenderness    Range of Motion   Active Abduction: abnormal   Sensation: normal    Knee Exam     Patellofemoral Crepitus: positive  Effusion: positive  Sensation: normal    Hip Exam   Tenderness Location: posterior  Sensation: normal    Elbow/Wrist Exam   Sensation: normal      Back/Neck Exam   General Inspection   Gait: normal         Lymphadenopathy:     She has no cervical adenopathy.   Neurological: She is alert and oriented to person, place, and time. Gait normal.   Skin: Skin is dry. No rash noted. No erythema. There is pallor.     Psychiatric: Mood and affect normal.   Musculoskeletal: She exhibits tenderness and deformity.         wbc 10.3, platlets 143, esr 63 , crp .82  Assessment:       1. Sjogren's syndrome, with unspecified organ involvement    2. Thrombocytopenia    3. Jake's disease, Jake's disease of unspecified site    4. Idiopathic progressive neuropathy            Plan:     Mack was seen today for 4 month follow up.    Diagnoses and all orders for this visit:    Sjogren's syndrome, with unspecified organ involvement  -     tizanidine (ZANAFLEX) 4 MG tablet; Take 1 tablet (4 mg total) by mouth every 8 (eight) hours.  -     hydroxychloroquine (PLAQUENIL) 200 mg tablet; Take 1 tablet (200 mg total) by mouth 2 (two) times daily.  -     CBC auto differential; Future  -     CBC auto differential  -     CBC auto differential; Future  -     Comprehensive metabolic panel; Future  -     C-reactive protein; Future  -     Sedimentation rate, manual; Future  -     CBC auto differential  -     Comprehensive metabolic panel  -     C-reactive protein  -     Sedimentation rate, manual  -     methylPREDNISolone acetate injection 160 mg; Inject 2 mLs (160 mg total) into the muscle one time.  -     ketorolac injection 60 mg; Inject 2 mLs (60 mg total) into the muscle one time.  -     cyanocobalamin injection 1,000 mcg; Inject 1 mL (1,000 mcg total) into the muscle  one time.  -     Sjogrens syndrome-A extractable nuclear antibody; Future  -     Sjogrens syndrome-B extractable nuclear antibody; Future  -     Sjogrens syndrome-A extractable nuclear antibody  -     Sjogrens syndrome-B extractable nuclear antibody    Thrombocytopenia  -     tizanidine (ZANAFLEX) 4 MG tablet; Take 1 tablet (4 mg total) by mouth every 8 (eight) hours.  -     hydroxychloroquine (PLAQUENIL) 200 mg tablet; Take 1 tablet (200 mg total) by mouth 2 (two) times daily.  -     CBC auto differential; Future  -     CBC auto differential  -     CBC auto differential; Future  -     Comprehensive metabolic panel; Future  -     C-reactive protein; Future  -     Sedimentation rate, manual; Future  -     CBC auto differential  -     Comprehensive metabolic panel  -     C-reactive protein  -     Sedimentation rate, manual  -     methylPREDNISolone acetate injection 160 mg; Inject 2 mLs (160 mg total) into the muscle one time.  -     ketorolac injection 60 mg; Inject 2 mLs (60 mg total) into the muscle one time.  -     cyanocobalamin injection 1,000 mcg; Inject 1 mL (1,000 mcg total) into the muscle one time.  -     Sjogrens syndrome-A extractable nuclear antibody; Future  -     Sjogrens syndrome-B extractable nuclear antibody; Future  -     Sjogrens syndrome-A extractable nuclear antibody  -     Sjogrens syndrome-B extractable nuclear antibody    Jake's disease, Jake's disease of unspecified site  -     tizanidine (ZANAFLEX) 4 MG tablet; Take 1 tablet (4 mg total) by mouth every 8 (eight) hours.  -     hydroxychloroquine (PLAQUENIL) 200 mg tablet; Take 1 tablet (200 mg total) by mouth 2 (two) times daily.  -     CBC auto differential; Future  -     CBC auto differential  -     CBC auto differential; Future  -     Comprehensive metabolic panel; Future  -     C-reactive protein; Future  -     Sedimentation rate, manual; Future  -     CBC auto differential  -     Comprehensive metabolic panel  -     C-reactive  protein  -     Sedimentation rate, manual  -     methylPREDNISolone acetate injection 160 mg; Inject 2 mLs (160 mg total) into the muscle one time.  -     ketorolac injection 60 mg; Inject 2 mLs (60 mg total) into the muscle one time.  -     cyanocobalamin injection 1,000 mcg; Inject 1 mL (1,000 mcg total) into the muscle one time.  -     Sjogrens syndrome-A extractable nuclear antibody; Future  -     Sjogrens syndrome-B extractable nuclear antibody; Future  -     Sjogrens syndrome-A extractable nuclear antibody  -     Sjogrens syndrome-B extractable nuclear antibody    Idiopathic progressive neuropathy  Comments:  arms and thighs and legs  Orders:  -     tizanidine (ZANAFLEX) 4 MG tablet; Take 1 tablet (4 mg total) by mouth every 8 (eight) hours.  -     hydroxychloroquine (PLAQUENIL) 200 mg tablet; Take 1 tablet (200 mg total) by mouth 2 (two) times daily.  -     CBC auto differential; Future  -     CBC auto differential  -     methylPREDNISolone acetate injection 160 mg; Inject 2 mLs (160 mg total) into the muscle one time.  -     ketorolac injection 60 mg; Inject 2 mLs (60 mg total) into the muscle one time.  -     cyanocobalamin injection 1,000 mcg; Inject 1 mL (1,000 mcg total) into the muscle one time.  -     Sjogrens syndrome-A extractable nuclear antibody; Future  -     Sjogrens syndrome-B extractable nuclear antibody; Future  -     Sjogrens syndrome-A extractable nuclear antibody  -     Sjogrens syndrome-B extractable nuclear antibody

## 2017-09-20 RX ORDER — MODAFINIL 200 MG/1
TABLET ORAL
Qty: 30 TABLET | Refills: 2 | Status: CANCELLED | OUTPATIENT
Start: 2017-09-20

## 2017-09-25 RX ORDER — MODAFINIL 200 MG/1
200 TABLET ORAL DAILY
Qty: 30 TABLET | Refills: 3 | Status: SHIPPED | OUTPATIENT
Start: 2017-09-25 | End: 2017-11-01 | Stop reason: SDUPTHER

## 2017-09-26 DIAGNOSIS — M02.30 REITER'S DISEASE, REITER'S DISEASE OF UNSPECIFIED SITE: ICD-10-CM

## 2017-09-26 DIAGNOSIS — M35.00 SJOGRENS SYNDROME: ICD-10-CM

## 2017-09-26 DIAGNOSIS — G60.3 IDIOPATHIC PROGRESSIVE NEUROPATHY: ICD-10-CM

## 2017-09-26 DIAGNOSIS — M35.00 SJOGREN'S SYNDROME: ICD-10-CM

## 2017-09-26 RX ORDER — CYANOCOBALAMIN 1000 UG/ML
INJECTION, SOLUTION INTRAMUSCULAR; SUBCUTANEOUS
Qty: 30 ML | Refills: 5 | Status: SHIPPED | OUTPATIENT
Start: 2017-09-26 | End: 2018-09-13 | Stop reason: SDUPTHER

## 2017-10-27 DIAGNOSIS — M35.00 SJOGREN'S SYNDROME, WITH UNSPECIFIED ORGAN INVOLVEMENT: ICD-10-CM

## 2017-10-27 DIAGNOSIS — G60.3 IDIOPATHIC PROGRESSIVE NEUROPATHY: ICD-10-CM

## 2017-10-27 DIAGNOSIS — M02.30 REITER'S DISEASE, REITER'S DISEASE OF UNSPECIFIED SITE: ICD-10-CM

## 2017-10-27 DIAGNOSIS — D69.6 THROMBOCYTOPENIA: ICD-10-CM

## 2017-10-29 RX ORDER — TIZANIDINE 4 MG/1
TABLET ORAL
Qty: 90 TABLET | Refills: 4 | Status: SHIPPED | OUTPATIENT
Start: 2017-10-29 | End: 2018-09-13 | Stop reason: SDUPTHER

## 2017-11-01 ENCOUNTER — OFFICE VISIT (OUTPATIENT)
Dept: RHEUMATOLOGY | Facility: CLINIC | Age: 52
End: 2017-11-01
Payer: COMMERCIAL

## 2017-11-01 VITALS
WEIGHT: 264.31 LBS | DIASTOLIC BLOOD PRESSURE: 76 MMHG | BODY MASS INDEX: 42.66 KG/M2 | SYSTOLIC BLOOD PRESSURE: 165 MMHG | HEART RATE: 70 BPM

## 2017-11-01 DIAGNOSIS — M35.00 SJOGREN'S SYNDROME, WITH UNSPECIFIED ORGAN INVOLVEMENT: Primary | ICD-10-CM

## 2017-11-01 DIAGNOSIS — G47.26 SHIFT WORK SLEEP DISORDER: ICD-10-CM

## 2017-11-01 DIAGNOSIS — M47.817 LUMBAR AND SACRAL ARTHRITIS: ICD-10-CM

## 2017-11-01 DIAGNOSIS — R09.81 SINUS CONGESTION: ICD-10-CM

## 2017-11-01 PROCEDURE — 99999 PR PBB SHADOW E&M-EST. PATIENT-LVL III: CPT | Mod: PBBFAC,,, | Performed by: INTERNAL MEDICINE

## 2017-11-01 PROCEDURE — 99214 OFFICE O/P EST MOD 30 MIN: CPT | Mod: 25,S$GLB,, | Performed by: INTERNAL MEDICINE

## 2017-11-01 PROCEDURE — 96372 THER/PROPH/DIAG INJ SC/IM: CPT | Mod: S$GLB,,, | Performed by: INTERNAL MEDICINE

## 2017-11-01 RX ORDER — MODAFINIL 200 MG/1
200 TABLET ORAL DAILY
Qty: 30 TABLET | Refills: 3 | Status: SHIPPED | OUTPATIENT
Start: 2017-11-01 | End: 2018-06-14 | Stop reason: SDUPTHER

## 2017-11-01 RX ORDER — CYANOCOBALAMIN 1000 UG/ML
1000 INJECTION, SOLUTION INTRAMUSCULAR; SUBCUTANEOUS
Status: COMPLETED | OUTPATIENT
Start: 2017-11-01 | End: 2017-11-01

## 2017-11-01 RX ORDER — LIDOCAINE 50 MG/G
3 PATCH TOPICAL DAILY
Qty: 90 PATCH | Refills: 3 | Status: SHIPPED | OUTPATIENT
Start: 2017-11-01 | End: 2019-08-20

## 2017-11-01 RX ORDER — METHYLPREDNISOLONE 4 MG/1
8 TABLET ORAL DAILY
Qty: 60 TABLET | Refills: 2 | Status: SHIPPED | OUTPATIENT
Start: 2017-11-01 | End: 2017-12-01

## 2017-11-01 RX ORDER — METHYLPREDNISOLONE ACETATE 80 MG/ML
160 INJECTION, SUSPENSION INTRA-ARTICULAR; INTRALESIONAL; INTRAMUSCULAR; SOFT TISSUE
Status: COMPLETED | OUTPATIENT
Start: 2017-11-01 | End: 2017-11-01

## 2017-11-01 RX ORDER — KETOROLAC TROMETHAMINE 30 MG/ML
60 INJECTION, SOLUTION INTRAMUSCULAR; INTRAVENOUS
Status: COMPLETED | OUTPATIENT
Start: 2017-11-01 | End: 2017-11-01

## 2017-11-01 RX ADMIN — KETOROLAC TROMETHAMINE 60 MG: 30 INJECTION, SOLUTION INTRAMUSCULAR; INTRAVENOUS at 04:11

## 2017-11-01 RX ADMIN — CYANOCOBALAMIN 1000 MCG: 1000 INJECTION, SOLUTION INTRAMUSCULAR; SUBCUTANEOUS at 04:11

## 2017-11-01 RX ADMIN — METHYLPREDNISOLONE ACETATE 160 MG: 80 INJECTION, SUSPENSION INTRA-ARTICULAR; INTRALESIONAL; INTRAMUSCULAR; SOFT TISSUE at 04:11

## 2017-11-01 ASSESSMENT — ROUTINE ASSESSMENT OF PATIENT INDEX DATA (RAPID3)
MDHAQ FUNCTION SCORE: 1.5
TOTAL RAPID3 SCORE: 5.33
PSYCHOLOGICAL DISTRESS SCORE: 3.3
FATIGUE SCORE: 4
PATIENT GLOBAL ASSESSMENT SCORE: 3
PAIN SCORE: 8
AM STIFFNESS SCORE: 1, YES
WHEN YOU AWAKENED IN THE MORNING OVER THE LAST WEEK, PLEASE INDICATE THE AMOUNT OF TIME IT TAKES UNTIL YOU ARE AS LIMBER AS YOU WILL BE FOR THE DAY: ONE HOUR AFTER WAKING

## 2017-11-01 NOTE — PROGRESS NOTES
Administered 1 cc ( 1000 mcg/ml ) of b12 to the right upper outer gluteal. Informed of s/s to report verbalized understanding. No adverse reactions noted.    Lot # [id number]  Expiration apr 19    Administered 2 cc ( 80 mg/ml ) of depomedrol to the right upper outer gluteal. Informed of s/s to report verbalized understanding. No adverse reactions noted.    Lot # [id number]  Expiration 09/2018    Administered 2 cc ( 30 mg/ml ) of toradol to the left upper outer gluteal. Informed of s/s to report verbalized understanding. No adverse reactions noted.    Lot # [id number]  Expiration 05/19

## 2017-11-01 NOTE — PROGRESS NOTES
Subjective:       Patient ID: Mack Melton is a 52 y.o. female.    Chief Complaint: Follow-up    Follow up:  sjogren's on plaquenil neck and lumbar pain. Patient complains of arthralgias and myalgias for which has been present for a few years. Pain is located in multiple joints, both shoulder(s), both elbow(s), both wrist(s), both MCP(s): 1st, 2nd, 3rd, 4th and 5th, both PIP(s): 1st, 2nd, 3rd, 4th and 5th, both DIP(s): 1st and 2nd, both hip(s), both knee(s) and both MTP(s): 1st, 2nd, 3rd, 4th and 5th, is described as aching, pulsating, shooting and throbbing, and is constant, moderate .       Review of Systems   Constitutional: Positive for activity change and chills. Negative for appetite change, diaphoresis and unexpected weight change.   HENT: Negative for congestion, dental problem, ear discharge, ear pain, facial swelling, mouth sores, nosebleeds, postnasal drip, rhinorrhea, sinus pressure, sneezing, sore throat, tinnitus and voice change.    Eyes: Negative for photophobia, pain, discharge, redness and itching.   Respiratory: Negative for apnea, cough, chest tightness, shortness of breath and wheezing.    Cardiovascular: Positive for leg swelling. Negative for chest pain and palpitations.   Gastrointestinal: Positive for abdominal pain. Negative for abdominal distention, constipation, diarrhea, nausea and vomiting.   Endocrine: Negative for cold intolerance, heat intolerance, polydipsia and polyuria.   Genitourinary: Negative for decreased urine volume, difficulty urinating, flank pain, frequency, hematuria and urgency.   Musculoskeletal: Positive for arthralgias, back pain, gait problem, neck pain and neck stiffness.   Skin: Negative for pallor, rash and wound.   Allergic/Immunologic: Negative for immunocompromised state.   Neurological: Positive for weakness. Negative for dizziness, tremors and numbness.   Hematological: Negative for adenopathy. Does not bruise/bleed easily.   Psychiatric/Behavioral: Negative  for sleep disturbance. The patient is not nervous/anxious.          Objective:     BP (!) 165/76 (BP Location: Right arm, Patient Position: Sitting, BP Method: Large (Automatic))   Pulse 70   Wt 119.9 kg (264 lb 5.3 oz)   BMI 42.66 kg/m²      Physical Exam   Vitals reviewed.  Constitutional: She is oriented to person, place, and time. No distress.   HENT:   Head: Normocephalic and atraumatic.   Eyes: EOM are normal. Pupils are equal, round, and reactive to light. Right eye exhibits no discharge. Left eye exhibits no discharge.   Neck: Neck supple. No thyromegaly present.   Cardiovascular: Normal rate, regular rhythm and normal heart sounds.  Exam reveals no gallop and no friction rub.    No murmur heard.  Pulmonary/Chest: Breath sounds normal. She has no wheezes. She has no rales. She exhibits no tenderness.   Abdominal: There is no tenderness. There is no rebound and no guarding.       Right Side Rheumatological Exam     Examination finds the elbow normal.    The patient is tender to palpation of the shoulder, wrist, knee, 1st PIP, 1st MCP, 2nd PIP, 2nd MCP, 3rd PIP, 3rd MCP, 4th PIP, 4th MCP, 5th PIP and 5th MCP    She has swelling of the 1st PIP, 1st MCP, 2nd PIP, 2nd MCP, 3rd PIP, 3rd MCP, 4th PIP, 4th MCP, 5th PIP and 5th MCP    Shoulder Exam   Tenderness Location: no tenderness    Range of Motion   Active Abduction: abnormal   Adduction: abnormal  Sensation: normal    Knee Exam   Patellofemoral Crepitus: positive  Effusion: positive  Sensation: normal    Hip Exam   Tenderness Location: posterior  Sensation: normal    Elbow/Wrist Exam   Tenderness Location: no tenderness  Sensation: normal    Left Side Rheumatological Exam     The patient is tender to palpation of the shoulder, elbow, wrist, knee, 1st PIP, 1st MCP, 2nd PIP, 2nd MCP, 3rd PIP, 3rd MCP, 4th PIP, 4th MCP, 5th PIP and 5th MCP.    She has swelling of the 1st PIP, 1st MCP, 2nd PIP, 2nd MCP, 3rd PIP, 3rd MCP, 4th PIP, 4th MCP, 5th PIP and 5th  MCP    Shoulder Exam   Tenderness Location: no tenderness    Range of Motion   Active Abduction: abnormal   Sensation: normal    Knee Exam     Patellofemoral Crepitus: positive  Effusion: positive  Sensation: normal    Hip Exam   Tenderness Location: posterior  Sensation: normal    Elbow/Wrist Exam   Sensation: normal      Back/Neck Exam   General Inspection   Gait: normal         Lymphadenopathy:     She has no cervical adenopathy.   Neurological: She is alert and oriented to person, place, and time. Gait normal.   Skin: Skin is dry. No rash noted. No erythema. There is pallor.     Psychiatric: Mood and affect normal.   Musculoskeletal: She exhibits tenderness and deformity.          Assessment:       1. Sjogren's syndrome, with unspecified organ involvement    2. Shift work sleep disorder    3. Sinus congestion    4. Lumbar and sacral arthritis            Plan:     Mack was seen today for follow-up.    Diagnoses and all orders for this visit:    Sjogren's syndrome, with unspecified organ involvement  -     CBC auto differential; Future  -     Comprehensive metabolic panel; Future  -     C-reactive protein; Future  -     Sedimentation rate, manual; Future  -     Vitamin D; Future  -     Ambulatory consult to ENT  -     CBC auto differential  -     Comprehensive metabolic panel  -     C-reactive protein  -     Sedimentation rate, manual  -     Vitamin D  -     methylPREDNISolone acetate injection 160 mg; Inject 2 mLs (160 mg total) into the muscle one time.  -     ketorolac injection 60 mg; Inject 2 mLs (60 mg total) into the muscle one time.  -     cyanocobalamin injection 1,000 mcg; Inject 1 mL (1,000 mcg total) into the muscle one time.  -     lidocaine (LIDODERM) 5 %; Place 3 patches onto the skin once daily. Remove & Discard patch within 12 hours or as directed by MD  -     methylPREDNISolone (MEDROL) 4 MG Tab; Take 2 tablets (8 mg total) by mouth once daily.    Shift work sleep disorder  -      methylPREDNISolone acetate injection 160 mg; Inject 2 mLs (160 mg total) into the muscle one time.  -     ketorolac injection 60 mg; Inject 2 mLs (60 mg total) into the muscle one time.  -     cyanocobalamin injection 1,000 mcg; Inject 1 mL (1,000 mcg total) into the muscle one time.  -     lidocaine (LIDODERM) 5 %; Place 3 patches onto the skin once daily. Remove & Discard patch within 12 hours or as directed by MD  -     methylPREDNISolone (MEDROL) 4 MG Tab; Take 2 tablets (8 mg total) by mouth once daily.    Sinus congestion  -     CBC auto differential; Future  -     Comprehensive metabolic panel; Future  -     C-reactive protein; Future  -     Sedimentation rate, manual; Future  -     Vitamin D; Future  -     Ambulatory consult to ENT  -     CBC auto differential  -     Comprehensive metabolic panel  -     C-reactive protein  -     Sedimentation rate, manual  -     Vitamin D  -     methylPREDNISolone acetate injection 160 mg; Inject 2 mLs (160 mg total) into the muscle one time.  -     ketorolac injection 60 mg; Inject 2 mLs (60 mg total) into the muscle one time.  -     cyanocobalamin injection 1,000 mcg; Inject 1 mL (1,000 mcg total) into the muscle one time.  -     lidocaine (LIDODERM) 5 %; Place 3 patches onto the skin once daily. Remove & Discard patch within 12 hours or as directed by MD  -     methylPREDNISolone (MEDROL) 4 MG Tab; Take 2 tablets (8 mg total) by mouth once daily.    Lumbar and sacral arthritis  -     lidocaine (LIDODERM) 5 %; Place 3 patches onto the skin once daily. Remove & Discard patch within 12 hours or as directed by MD  -     methylPREDNISolone (MEDROL) 4 MG Tab; Take 2 tablets (8 mg total) by mouth once daily.    Other orders  -     modafinil (PROVIGIL) 200 MG Tab; Take 1 tablet (200 mg total) by mouth once daily.

## 2017-12-18 DIAGNOSIS — G60.3 IDIOPATHIC PROGRESSIVE NEUROPATHY: ICD-10-CM

## 2017-12-18 DIAGNOSIS — M02.30 REITER'S DISEASE, REITER'S DISEASE OF UNSPECIFIED SITE: ICD-10-CM

## 2017-12-18 DIAGNOSIS — M35.00 SJOGREN'S SYNDROME: ICD-10-CM

## 2017-12-19 RX ORDER — PIROXICAM 20 MG/1
CAPSULE ORAL
Qty: 30 CAPSULE | Refills: 4 | Status: SHIPPED | OUTPATIENT
Start: 2017-12-19 | End: 2018-09-13 | Stop reason: SDUPTHER

## 2018-03-12 ENCOUNTER — TELEPHONE (OUTPATIENT)
Dept: RHEUMATOLOGY | Facility: CLINIC | Age: 53
End: 2018-03-12

## 2018-03-12 NOTE — TELEPHONE ENCOUNTER
Incoming call transferred from phone room. Spoke with Catherine at [facility], advises pt was in to have labs drawn for Meaghan and Michaela and some are the same labs. Inquired if they could combine the orders as not to repeat labs and they will send results to both physicians. Verbal approval to combine orders.

## 2018-03-12 NOTE — TELEPHONE ENCOUNTER
----- Message from Madison Macias sent at 3/12/2018  2:47 PM CDT -----  Catherine with [facility] in [place] at  [phone] cell, calling for permission to combine the patients labs with another doctors due to insurance billing. IM to nurse. Please advise. Thanks.

## 2018-04-26 ENCOUNTER — OFFICE VISIT (OUTPATIENT)
Dept: RHEUMATOLOGY | Facility: CLINIC | Age: 53
End: 2018-04-26
Payer: COMMERCIAL

## 2018-04-26 VITALS
SYSTOLIC BLOOD PRESSURE: 155 MMHG | WEIGHT: 260 LBS | BODY MASS INDEX: 41.78 KG/M2 | DIASTOLIC BLOOD PRESSURE: 83 MMHG | HEIGHT: 66 IN | HEART RATE: 65 BPM

## 2018-04-26 DIAGNOSIS — M35.00 SJOGREN'S SYNDROME, WITH UNSPECIFIED ORGAN INVOLVEMENT: Primary | ICD-10-CM

## 2018-04-26 DIAGNOSIS — D69.6 THROMBOCYTOPENIA: ICD-10-CM

## 2018-04-26 DIAGNOSIS — G47.26 SHIFT WORK SLEEP DISORDER: ICD-10-CM

## 2018-04-26 DIAGNOSIS — M02.30 REITER'S DISEASE, REITER'S DISEASE OF UNSPECIFIED SITE: ICD-10-CM

## 2018-04-26 DIAGNOSIS — G89.29 CHRONIC PAIN OF RIGHT ANKLE: ICD-10-CM

## 2018-04-26 DIAGNOSIS — G60.3 IDIOPATHIC PROGRESSIVE NEUROPATHY: ICD-10-CM

## 2018-04-26 DIAGNOSIS — M25.571 CHRONIC PAIN OF RIGHT ANKLE: ICD-10-CM

## 2018-04-26 PROCEDURE — 99999 PR PBB SHADOW E&M-EST. PATIENT-LVL IV: CPT | Mod: PBBFAC,,, | Performed by: INTERNAL MEDICINE

## 2018-04-26 PROCEDURE — 99214 OFFICE O/P EST MOD 30 MIN: CPT | Mod: 25,S$GLB,, | Performed by: INTERNAL MEDICINE

## 2018-04-26 PROCEDURE — 96372 THER/PROPH/DIAG INJ SC/IM: CPT | Mod: S$GLB,,, | Performed by: INTERNAL MEDICINE

## 2018-04-26 PROCEDURE — 3008F BODY MASS INDEX DOCD: CPT | Mod: CPTII,S$GLB,, | Performed by: INTERNAL MEDICINE

## 2018-04-26 RX ORDER — HYDROXYCHLOROQUINE SULFATE 200 MG/1
200 TABLET, FILM COATED ORAL 2 TIMES DAILY
Qty: 60 TABLET | Refills: 11 | Status: SHIPPED | OUTPATIENT
Start: 2018-04-26 | End: 2018-11-30 | Stop reason: SDUPTHER

## 2018-04-26 RX ORDER — KETOROLAC TROMETHAMINE 30 MG/ML
60 INJECTION, SOLUTION INTRAMUSCULAR; INTRAVENOUS
Status: COMPLETED | OUTPATIENT
Start: 2018-04-26 | End: 2018-04-26

## 2018-04-26 RX ORDER — METHYLPREDNISOLONE ACETATE 80 MG/ML
160 INJECTION, SUSPENSION INTRA-ARTICULAR; INTRALESIONAL; INTRAMUSCULAR; SOFT TISSUE
Status: COMPLETED | OUTPATIENT
Start: 2018-04-26 | End: 2018-04-26

## 2018-04-26 RX ORDER — CYANOCOBALAMIN 1000 UG/ML
1000 INJECTION, SOLUTION INTRAMUSCULAR; SUBCUTANEOUS
Status: COMPLETED | OUTPATIENT
Start: 2018-04-26 | End: 2018-04-26

## 2018-04-26 RX ADMIN — KETOROLAC TROMETHAMINE 60 MG: 30 INJECTION, SOLUTION INTRAMUSCULAR; INTRAVENOUS at 05:04

## 2018-04-26 RX ADMIN — METHYLPREDNISOLONE ACETATE 160 MG: 80 INJECTION, SUSPENSION INTRA-ARTICULAR; INTRALESIONAL; INTRAMUSCULAR; SOFT TISSUE at 05:04

## 2018-04-26 RX ADMIN — CYANOCOBALAMIN 1000 MCG: 1000 INJECTION, SOLUTION INTRAMUSCULAR; SUBCUTANEOUS at 05:04

## 2018-04-26 NOTE — PROGRESS NOTES
Administered 1 cc ( 1000 mcg/ml ) of b12 to the right upper outer gluteal. Informed of s/s to report verbalized understanding. No adverse reactions noted.    Lot # [id number]  Expiration jul 19    Administered 2 cc ( 80 mg/ml ) of depomedrol to the right upper outer gluteal. Informed of s/s to report verbalized understanding. No adverse reactions noted.    Lot # [id number]  Expiration 06/19    Administered 2 cc ( 30 mg/ml ) of toradol to the left upper outer gluteal. Informed of s/s to report verbalized understanding. No adverse reactions noted.    Lot # [id number]  Expiration 1 nov 2019

## 2018-04-26 NOTE — PROGRESS NOTES
Subjective:       Patient ID: Mack Melton is a 53 y.o. female.    Chief Complaint: Follow-up    Follow up:  sjogren's on plaquenil neck and lumbar pain. Patient complains of arthralgias and myalgias for which has been present for a few years. Pain is located in multiple joints, both shoulder(s), both elbow(s), both wrist(s), both MCP(s): 1st, 2nd, 3rd, 4th and 5th, both PIP(s): 1st, 2nd, 3rd, 4th and 5th, both DIP(s): 1st and 2nd, both hip(s), both knee(s) and both MTP(s): 1st, 2nd, 3rd, 4th and 5th, is described as aching, pulsating, shooting and throbbing, and is constant, moderate .       Review of Systems   Constitutional: Positive for activity change and chills. Negative for appetite change, diaphoresis and unexpected weight change.   HENT: Negative for congestion, dental problem, ear discharge, ear pain, facial swelling, mouth sores, nosebleeds, postnasal drip, rhinorrhea, sinus pressure, sneezing, sore throat, tinnitus and voice change.    Eyes: Negative for photophobia, pain, discharge, redness and itching.   Respiratory: Negative for apnea, cough, chest tightness, shortness of breath and wheezing.    Cardiovascular: Positive for leg swelling. Negative for chest pain and palpitations.   Gastrointestinal: Positive for abdominal pain. Negative for abdominal distention, constipation, diarrhea, nausea and vomiting.   Endocrine: Negative for cold intolerance, heat intolerance, polydipsia and polyuria.   Genitourinary: Negative for decreased urine volume, difficulty urinating, flank pain, frequency, hematuria and urgency.   Musculoskeletal: Positive for arthralgias, back pain, gait problem, neck pain and neck stiffness.   Skin: Negative for pallor, rash and wound.   Allergic/Immunologic: Negative for immunocompromised state.   Neurological: Positive for weakness. Negative for dizziness, tremors and numbness.   Hematological: Negative for adenopathy. Does not bruise/bleed easily.   Psychiatric/Behavioral: Negative  "for sleep disturbance. The patient is not nervous/anxious.          Objective:     BP (!) 155/83   Pulse 65   Ht 5' 6" (1.676 m)   Wt 117.9 kg (260 lb)   BMI 41.97 kg/m²      Physical Exam   Vitals reviewed.  Constitutional: She is oriented to person, place, and time. No distress.   HENT:   Head: Normocephalic and atraumatic.   Eyes: EOM are normal. Pupils are equal, round, and reactive to light. Right eye exhibits no discharge. Left eye exhibits no discharge.   Neck: Neck supple. No thyromegaly present.   Cardiovascular: Normal rate, regular rhythm and normal heart sounds.  Exam reveals no gallop and no friction rub.    No murmur heard.  Pulmonary/Chest: Breath sounds normal. She has no wheezes. She has no rales. She exhibits no tenderness.   Abdominal: There is no tenderness. There is no rebound and no guarding.       Right Side Rheumatological Exam     Examination finds the elbow normal.    The patient is tender to palpation of the shoulder, wrist, knee, 1st PIP, 1st MCP, 2nd PIP, 2nd MCP, 3rd PIP, 3rd MCP, 4th PIP, 4th MCP, 5th PIP and 5th MCP    She has swelling of the 1st PIP, 1st MCP, 2nd PIP, 2nd MCP, 3rd PIP, 3rd MCP, 4th PIP, 4th MCP, 5th PIP and 5th MCP    Shoulder Exam   Tenderness Location: no tenderness    Range of Motion   Active Abduction: abnormal   Adduction: abnormal  Sensation: normal    Knee Exam   Patellofemoral Crepitus: positive  Effusion: positive  Sensation: normal    Hip Exam   Tenderness Location: posterior  Sensation: normal    Elbow/Wrist Exam   Tenderness Location: no tenderness  Sensation: normal    Left Side Rheumatological Exam     The patient is tender to palpation of the shoulder, elbow, wrist, knee, 1st PIP, 1st MCP, 2nd PIP, 2nd MCP, 3rd PIP, 3rd MCP, 4th PIP, 4th MCP, 5th PIP and 5th MCP.    She has swelling of the 1st PIP, 1st MCP, 2nd PIP, 2nd MCP, 3rd PIP, 3rd MCP, 4th PIP, 4th MCP, 5th PIP and 5th MCP    Shoulder Exam   Tenderness Location: no tenderness    Range of " Motion   Active Abduction: abnormal   Sensation: normal    Knee Exam     Patellofemoral Crepitus: positive  Effusion: positive  Sensation: normal    Hip Exam   Tenderness Location: posterior  Sensation: normal    Elbow/Wrist Exam   Sensation: normal      Back/Neck Exam   General Inspection   Gait: normal         Lymphadenopathy:     She has no cervical adenopathy.   Neurological: She is alert and oriented to person, place, and time. Gait normal.   Skin: Skin is dry. No rash noted. No erythema. There is pallor.     Psychiatric: Mood and affect normal.   Musculoskeletal: She exhibits tenderness and deformity.         ssa 8,   Assessment:       1. Thrombocytopenia    2. Chronic pain of right ankle    3. Sjogren's syndrome, with unspecified organ involvement    4. Shift work sleep disorder    5. Jake's disease, Jake's disease of unspecified site    6. Idiopathic progressive neuropathy            Plan:     Mack was seen today for follow-up.    Diagnoses and all orders for this visit:    Sjogren's syndrome, with unspecified organ involvement  -     methylPREDNISolone acetate injection 160 mg; Inject 2 mLs (160 mg total) into the muscle one time.  -     ketorolac injection 60 mg; Inject 2 mLs (60 mg total) into the muscle one time.  -     cyanocobalamin injection 1,000 mcg; Inject 1 mL (1,000 mcg total) into the muscle one time.  -     hydroxychloroquine (PLAQUENIL) 200 mg tablet; Take 1 tablet (200 mg total) by mouth 2 (two) times daily.    Thrombocytopenia  -     X-Ray Foot Complete Right; Future  -     X-Ray Ankle Complete Right; Future  -     US Abdomen Complete; Future  -     X-Ray Foot Complete Right  -     X-Ray Ankle Complete Right  -     US Abdomen Complete  -     Protein electrophoresis, serum; Future  -     NADEEN-BARR VIRUS ANTIBODY PANEL; Future  -     NADEEN-BARR VIRUS VCA, IGM; Future  -     NADEEN-PAYNE VIRUS VCA, IGG; Future  -     CYTOMEGALOVIRUS ANTIBODY, IGG; Future  -     CYTOMEGALOVIRUS (CMV)  AB, IGM; Future  -     PARVOVIRUS B19 ANTIBODY, IGG AND IGM; Future  -     Protein electrophoresis, serum  -     NADEEN-BARR VIRUS ANTIBODY PANEL  -     NADEEN-BARR VIRUS VCA, IGM  -     NADEEN-PAYNE VIRUS VCA, IGG  -     CYTOMEGALOVIRUS ANTIBODY, IGG  -     CYTOMEGALOVIRUS (CMV) AB, IGM  -     PARVOVIRUS B19 ANTIBODY, IGG AND IGM  -     methylPREDNISolone acetate injection 160 mg; Inject 2 mLs (160 mg total) into the muscle one time.  -     ketorolac injection 60 mg; Inject 2 mLs (60 mg total) into the muscle one time.  -     cyanocobalamin injection 1,000 mcg; Inject 1 mL (1,000 mcg total) into the muscle one time.  -     hydroxychloroquine (PLAQUENIL) 200 mg tablet; Take 1 tablet (200 mg total) by mouth 2 (two) times daily.    Chronic pain of right ankle  -     X-Ray Foot Complete Right; Future  -     X-Ray Ankle Complete Right; Future  -     US Abdomen Complete; Future  -     X-Ray Foot Complete Right  -     X-Ray Ankle Complete Right  -     US Abdomen Complete  -     Protein electrophoresis, serum; Future  -     NADEEN-BARR VIRUS ANTIBODY PANEL; Future  -     NADEEN-BARR VIRUS VCA, IGM; Future  -     NADEEN-PAYNE VIRUS VCA, IGG; Future  -     CYTOMEGALOVIRUS ANTIBODY, IGG; Future  -     CYTOMEGALOVIRUS (CMV) AB, IGM; Future  -     PARVOVIRUS B19 ANTIBODY, IGG AND IGM; Future  -     Protein electrophoresis, serum  -     NADEEN-BARR VIRUS ANTIBODY PANEL  -     NADEEN-BARR VIRUS VCA, IGM  -     NADEEN-PAYNE VIRUS VCA, IGG  -     CYTOMEGALOVIRUS ANTIBODY, IGG  -     CYTOMEGALOVIRUS (CMV) AB, IGM  -     PARVOVIRUS B19 ANTIBODY, IGG AND IGM  -     methylPREDNISolone acetate injection 160 mg; Inject 2 mLs (160 mg total) into the muscle one time.  -     ketorolac injection 60 mg; Inject 2 mLs (60 mg total) into the muscle one time.  -     cyanocobalamin injection 1,000 mcg; Inject 1 mL (1,000 mcg total) into the muscle one time.    Shift work sleep disorder  -     methylPREDNISolone acetate injection 160 mg;  Inject 2 mLs (160 mg total) into the muscle one time.  -     ketorolac injection 60 mg; Inject 2 mLs (60 mg total) into the muscle one time.  -     cyanocobalamin injection 1,000 mcg; Inject 1 mL (1,000 mcg total) into the muscle one time.    Jake's disease, Jake's disease of unspecified site  -     hydroxychloroquine (PLAQUENIL) 200 mg tablet; Take 1 tablet (200 mg total) by mouth 2 (two) times daily.    Idiopathic progressive neuropathy  Comments:  arms and thighs and legs  Orders:  -     hydroxychloroquine (PLAQUENIL) 200 mg tablet; Take 1 tablet (200 mg total) by mouth 2 (two) times daily.

## 2018-06-20 RX ORDER — MODAFINIL 200 MG/1
TABLET ORAL
Qty: 30 TABLET | Refills: 2 | Status: SHIPPED | OUTPATIENT
Start: 2018-06-20 | End: 2018-09-13 | Stop reason: SDUPTHER

## 2018-08-21 ENCOUNTER — TELEPHONE (OUTPATIENT)
Dept: RADIOLOGY | Facility: HOSPITAL | Age: 53
End: 2018-08-21

## 2018-08-22 ENCOUNTER — HOSPITAL ENCOUNTER (OUTPATIENT)
Dept: RADIOLOGY | Facility: HOSPITAL | Age: 53
Discharge: HOME | End: 2018-08-22
Attending: INTERNAL MEDICINE
Payer: COMMERCIAL

## 2018-08-22 PROCEDURE — 73630 X-RAY EXAM OF FOOT: CPT | Mod: TC,PO,RT

## 2018-08-22 PROCEDURE — 73610 X-RAY EXAM OF ANKLE: CPT | Mod: 26,RT,, | Performed by: RADIOLOGY

## 2018-08-22 PROCEDURE — 76700 US EXAM ABDOM COMPLETE: CPT | Mod: 26,,, | Performed by: RADIOLOGY

## 2018-08-22 PROCEDURE — 76700 US EXAM ABDOM COMPLETE: CPT | Mod: TC,PO

## 2018-08-22 PROCEDURE — 73630 X-RAY EXAM OF FOOT: CPT | Mod: 26,RT,, | Performed by: RADIOLOGY

## 2018-08-22 PROCEDURE — 73610 X-RAY EXAM OF ANKLE: CPT | Mod: TC,PO,RT

## 2018-08-27 ENCOUNTER — PATIENT MESSAGE (OUTPATIENT)
Dept: RHEUMATOLOGY | Facility: CLINIC | Age: 53
End: 2018-08-27

## 2018-08-27 LAB
ALBUMIN SERPL ELPH-MCNC: 3.8 G/DL (ref 3.8–4.8)
ALPHA1 GLOB SERPL ELPH-MCNC: 0.3 G/DL (ref 0.2–0.3)
ALPHA2 GLOB SERPL ELPH-MCNC: 0.9 G/DL (ref 0.5–0.9)
B19V IGG SER IA-ACNC: 7
B19V IGM SER IA-ACNC: 0.2
BETA1 GLOB SERPL ELPH-MCNC: 0.4 G/DL (ref 0.4–0.6)
BETA2 GLOB SERPL ELPH-MCNC: 0.5 G/DL (ref 0.2–0.5)
CMV IGG SERPL IA-ACNC: >10 U/ML
CMV IGM SERPL IA-ACNC: <30 AU/ML
EBV NA IGG SER IA-ACNC: 433 U/ML
EBV PATRN SPEC IB-IMP: ABNORMAL
EBV VCA IGG SER IA-ACNC: >750 U/ML
EBV VCA IGM SER IA-ACNC: <36 U/ML
GAMMA GLOB SERPL ELPH-MCNC: 1.3 G/DL (ref 0.8–1.7)
PROT PATTERN SERPL ELPH-IMP: NORMAL
PROT SERPL-MCNC: 7.1 G/DL (ref 6.1–8.1)

## 2018-09-13 ENCOUNTER — OFFICE VISIT (OUTPATIENT)
Dept: RHEUMATOLOGY | Facility: CLINIC | Age: 53
End: 2018-09-13
Payer: COMMERCIAL

## 2018-09-13 VITALS
WEIGHT: 260 LBS | SYSTOLIC BLOOD PRESSURE: 177 MMHG | HEART RATE: 67 BPM | DIASTOLIC BLOOD PRESSURE: 85 MMHG | HEIGHT: 66 IN | BODY MASS INDEX: 41.78 KG/M2

## 2018-09-13 DIAGNOSIS — M35.00 SJOGREN'S SYNDROME, WITH UNSPECIFIED ORGAN INVOLVEMENT: Primary | ICD-10-CM

## 2018-09-13 DIAGNOSIS — D69.6 THROMBOCYTOPENIA: ICD-10-CM

## 2018-09-13 DIAGNOSIS — M35.09 SJOGREN'S SYNDROME WITH OTHER ORGAN INVOLVEMENT: ICD-10-CM

## 2018-09-13 DIAGNOSIS — M47.817 LUMBAR AND SACRAL ARTHRITIS: ICD-10-CM

## 2018-09-13 DIAGNOSIS — G60.3 IDIOPATHIC PROGRESSIVE NEUROPATHY: ICD-10-CM

## 2018-09-13 DIAGNOSIS — M02.30 REITER'S DISEASE, REITER'S DISEASE OF UNSPECIFIED SITE: ICD-10-CM

## 2018-09-13 PROCEDURE — 3008F BODY MASS INDEX DOCD: CPT | Mod: CPTII,S$GLB,, | Performed by: INTERNAL MEDICINE

## 2018-09-13 PROCEDURE — 99214 OFFICE O/P EST MOD 30 MIN: CPT | Mod: 25,S$GLB,, | Performed by: INTERNAL MEDICINE

## 2018-09-13 PROCEDURE — 96372 THER/PROPH/DIAG INJ SC/IM: CPT | Mod: S$GLB,,, | Performed by: INTERNAL MEDICINE

## 2018-09-13 PROCEDURE — 99999 PR PBB SHADOW E&M-EST. PATIENT-LVL IV: CPT | Mod: PBBFAC,,, | Performed by: INTERNAL MEDICINE

## 2018-09-13 RX ORDER — PIROXICAM 20 MG/1
20 CAPSULE ORAL DAILY
Qty: 30 CAPSULE | Refills: 6 | Status: SHIPPED | OUTPATIENT
Start: 2018-09-13 | End: 2019-03-25 | Stop reason: SDUPTHER

## 2018-09-13 RX ORDER — KETOROLAC TROMETHAMINE 30 MG/ML
60 INJECTION, SOLUTION INTRAMUSCULAR; INTRAVENOUS
Status: COMPLETED | OUTPATIENT
Start: 2018-09-13 | End: 2018-09-13

## 2018-09-13 RX ORDER — MODAFINIL 200 MG/1
200 TABLET ORAL DAILY
Qty: 30 TABLET | Refills: 4 | Status: SHIPPED | OUTPATIENT
Start: 2018-09-13 | End: 2019-04-18 | Stop reason: SDUPTHER

## 2018-09-13 RX ORDER — METHYLPREDNISOLONE 4 MG/1
TABLET ORAL
Qty: 60 TABLET | Refills: 1 | Status: SHIPPED | OUTPATIENT
Start: 2018-09-13 | End: 2018-09-13 | Stop reason: SDUPTHER

## 2018-09-13 RX ORDER — TIZANIDINE 4 MG/1
4 TABLET ORAL EVERY 8 HOURS PRN
Qty: 90 TABLET | Refills: 12 | Status: SHIPPED | OUTPATIENT
Start: 2018-09-13 | End: 2019-08-20 | Stop reason: SDUPTHER

## 2018-09-13 RX ORDER — METHYLPREDNISOLONE ACETATE 80 MG/ML
160 INJECTION, SUSPENSION INTRA-ARTICULAR; INTRALESIONAL; INTRAMUSCULAR; SOFT TISSUE
Status: COMPLETED | OUTPATIENT
Start: 2018-09-13 | End: 2018-09-13

## 2018-09-13 RX ORDER — METHYLPREDNISOLONE 4 MG/1
8 TABLET ORAL DAILY
Qty: 60 TABLET | Refills: 6 | Status: SHIPPED | OUTPATIENT
Start: 2018-09-13 | End: 2019-11-19 | Stop reason: SDUPTHER

## 2018-09-13 RX ORDER — CYANOCOBALAMIN 1000 UG/ML
1000 INJECTION, SOLUTION INTRAMUSCULAR; SUBCUTANEOUS
Status: COMPLETED | OUTPATIENT
Start: 2018-09-13 | End: 2018-09-13

## 2018-09-13 RX ORDER — CYANOCOBALAMIN 1000 UG/ML
INJECTION, SOLUTION INTRAMUSCULAR; SUBCUTANEOUS
Qty: 30 ML | Refills: 5 | Status: SHIPPED | OUTPATIENT
Start: 2018-09-13 | End: 2019-03-25 | Stop reason: SDUPTHER

## 2018-09-13 RX ADMIN — METHYLPREDNISOLONE ACETATE 160 MG: 80 INJECTION, SUSPENSION INTRA-ARTICULAR; INTRALESIONAL; INTRAMUSCULAR; SOFT TISSUE at 05:09

## 2018-09-13 RX ADMIN — KETOROLAC TROMETHAMINE 60 MG: 30 INJECTION, SOLUTION INTRAMUSCULAR; INTRAVENOUS at 05:09

## 2018-09-13 RX ADMIN — CYANOCOBALAMIN 1000 MCG: 1000 INJECTION, SOLUTION INTRAMUSCULAR; SUBCUTANEOUS at 05:09

## 2018-09-13 NOTE — PROGRESS NOTES
Subjective:       Patient ID: Mack Melton is a 53 y.o. female.    Chief Complaint: Follow-up    Follow up:  sjogren's on plaquenil neck and lumbar pain. Patient complains of arthralgias and myalgias for which has been present for a few years. Pain is located in multiple joints, both shoulder(s), both elbow(s), both wrist(s), both MCP(s): 1st, 2nd, 3rd, 4th and 5th, both PIP(s): 1st, 2nd, 3rd, 4th and 5th, both DIP(s): 1st and 2nd, both hip(s), both knee(s) and both MTP(s): 1st, 2nd, 3rd, 4th and 5th, is described as aching, pulsating, shooting and throbbing, and is constant, moderate .       Review of Systems   Constitutional: Positive for activity change and chills. Negative for appetite change, diaphoresis and unexpected weight change.   HENT: Negative for congestion, dental problem, ear discharge, ear pain, facial swelling, mouth sores, nosebleeds, postnasal drip, rhinorrhea, sinus pressure, sneezing, sore throat, tinnitus and voice change.    Eyes: Negative for photophobia, pain, discharge, redness and itching.   Respiratory: Negative for apnea, cough, chest tightness, shortness of breath and wheezing.    Cardiovascular: Positive for leg swelling. Negative for chest pain and palpitations.   Gastrointestinal: Positive for abdominal pain. Negative for abdominal distention, constipation, diarrhea, nausea and vomiting.   Endocrine: Negative for cold intolerance, heat intolerance, polydipsia and polyuria.   Genitourinary: Negative for decreased urine volume, difficulty urinating, flank pain, frequency, hematuria and urgency.   Musculoskeletal: Positive for arthralgias, back pain, gait problem, neck pain and neck stiffness.   Skin: Negative for pallor, rash and wound.   Allergic/Immunologic: Negative for immunocompromised state.   Neurological: Positive for weakness. Negative for dizziness, tremors and numbness.   Hematological: Negative for adenopathy. Does not bruise/bleed easily.   Psychiatric/Behavioral: Negative  "for sleep disturbance. The patient is not nervous/anxious.          Objective:     BP (!) 177/85   Pulse 67   Ht 5' 6" (1.676 m)   Wt 117.9 kg (260 lb)   BMI 41.97 kg/m²      Physical Exam   Vitals reviewed.  Constitutional: She is oriented to person, place, and time. No distress.   HENT:   Head: Normocephalic and atraumatic.   Eyes: EOM are normal. Pupils are equal, round, and reactive to light. Right eye exhibits no discharge. Left eye exhibits no discharge.   Neck: Neck supple. No thyromegaly present.   Cardiovascular: Normal rate, regular rhythm and normal heart sounds.  Exam reveals no gallop and no friction rub.    No murmur heard.  Pulmonary/Chest: Breath sounds normal. She has no wheezes. She has no rales. She exhibits no tenderness.   Abdominal: There is no tenderness. There is no rebound and no guarding.       Right Side Rheumatological Exam     Examination finds the elbow normal.    The patient is tender to palpation of the shoulder, wrist, knee, 1st PIP, 1st MCP, 2nd PIP, 2nd MCP, 3rd PIP, 3rd MCP, 4th PIP, 4th MCP, 5th PIP and 5th MCP    She has swelling of the 1st PIP, 1st MCP, 2nd PIP, 2nd MCP, 3rd PIP, 3rd MCP, 4th PIP, 4th MCP, 5th PIP and 5th MCP    Shoulder Exam   Tenderness Location: no tenderness    Range of Motion   Active abduction: abnormal   Adduction: abnormal  Sensation: normal    Knee Exam   Patellofemoral Crepitus: positive  Effusion: positive  Sensation: normal    Hip Exam   Tenderness Location: posterior  Sensation: normal    Elbow/Wrist Exam   Tenderness Location: no tenderness  Sensation: normal    Left Side Rheumatological Exam     The patient is tender to palpation of the shoulder, elbow, wrist, knee, 1st PIP, 1st MCP, 2nd PIP, 2nd MCP, 3rd PIP, 3rd MCP, 4th PIP, 4th MCP, 5th PIP and 5th MCP.    She has swelling of the 1st PIP, 1st MCP, 2nd PIP, 2nd MCP, 3rd PIP, 3rd MCP, 4th PIP, 4th MCP, 5th PIP and 5th MCP    Shoulder Exam   Tenderness Location: no tenderness    Range of " Motion   Active abduction: abnormal   Sensation: normal    Knee Exam     Patellofemoral Crepitus: positive  Effusion: positive  Sensation: normal    Hip Exam   Tenderness Location: posterior  Sensation: normal    Elbow/Wrist Exam   Sensation: normal      Back/Neck Exam   General Inspection   Gait: normal         Lymphadenopathy:     She has no cervical adenopathy.   Neurological: She is alert and oriented to person, place, and time. Gait normal.   Skin: Skin is dry. No rash noted. No erythema. There is pallor.     Psychiatric: Mood and affect normal.   Musculoskeletal: She exhibits tenderness and deformity.         Results for orders placed or performed in visit on 08/22/18   Protein, total   Result Value Ref Range    Total Protein 7.1 6.1 - 8.1 g/dL   Protein Electrophoresis   Result Value Ref Range    Albumin 3.8 3.8 - 4.8 g/dL    Alpha-1-Globulins 0.3 0.2 - 0.3 g/dL    Alpha-2-Globulins 0.9 0.5 - 0.9 g/dL    Beta Globulin 0.4 0.4 - 0.6 g/dL    Beta-2 Microglobulin 0.5 0.2 - 0.5 g/dL    Gamma Globulin 1.3 0.8 - 1.7 g/dL    Interpretation     Cytomegalovirus antibody, IgG   Result Value Ref Range    CMV IgG >10.00 (H) U/mL   Samuel-Barr Virus antibody panel   Result Value Ref Range    EBV VCA IgM <36.00 U/mL    EBV VCA IgG >750.00 (H) U/mL    EBV Nuclear Ag Ab 433.00 (H) U/mL    EBV Interp.     Parvovirus B19 Antibody (IGG)   Result Value Ref Range    Parvovirus Ab B19 IgG 7.0 (H)    Parvovirus B19 Antibody (IGM)   Result Value Ref Range    Parvovirus Ab B19 IgM 0.2    Cytomegalovirus (Cmv) Ab, Igm   Result Value Ref Range    CMV IgM <30.00 AU/mL     ,   Assessment:       1. Sjogren's syndrome, with unspecified organ involvement    2. Thrombocytopenia    3. Lumbar and sacral arthritis    4. Sjogrens syndrome    5. Jake's disease, Jake's disease of unspecified site    6. Sjogren's syndrome    7. Idiopathic progressive neuropathy            Plan:     Mack was seen today for follow-up.    Diagnoses and all  orders for this visit:    Sjogren's syndrome, with unspecified organ involvement  -     CBC auto differential; Future  -     Comprehensive metabolic panel; Future  -     C-reactive protein; Future  -     Sedimentation rate; Future  -     Sjogrens syndrome-A extractable nuclear antibody; Future  -     Sjogrens syndrome-B extractable nuclear antibody; Future  -     SIENA; Future  -     CBC auto differential  -     Comprehensive metabolic panel  -     C-reactive protein  -     Sedimentation rate  -     Sjogrens syndrome-A extractable nuclear antibody  -     Sjogrens syndrome-B extractable nuclear antibody  -     SIENA  -     tiZANidine (ZANAFLEX) 4 MG tablet; Take 1 tablet (4 mg total) by mouth every 8 (eight) hours as needed.    Thrombocytopenia  -     CBC auto differential; Future  -     Comprehensive metabolic panel; Future  -     C-reactive protein; Future  -     Sedimentation rate; Future  -     Sjogrens syndrome-A extractable nuclear antibody; Future  -     Sjogrens syndrome-B extractable nuclear antibody; Future  -     SIENA; Future  -     CBC auto differential  -     Comprehensive metabolic panel  -     C-reactive protein  -     Sedimentation rate  -     Sjogrens syndrome-A extractable nuclear antibody  -     Sjogrens syndrome-B extractable nuclear antibody  -     SEINA  -     tiZANidine (ZANAFLEX) 4 MG tablet; Take 1 tablet (4 mg total) by mouth every 8 (eight) hours as needed.    Lumbar and sacral arthritis  -     CBC auto differential; Future  -     Comprehensive metabolic panel; Future  -     C-reactive protein; Future  -     Sedimentation rate; Future  -     Sjogrens syndrome-A extractable nuclear antibody; Future  -     Sjogrens syndrome-B extractable nuclear antibody; Future  -     SIENA; Future  -     CBC auto differential  -     Comprehensive metabolic panel  -     C-reactive protein  -     Sedimentation rate  -     Sjogrens syndrome-A extractable nuclear antibody  -     Sjogrens syndrome-B extractable nuclear  antibody  -     SIEAN    Sjogrens syndrome  -     cyanocobalamin 1,000 mcg/mL injection; INJECT ONE ML INTRAMUSCULARLY WEEKLY    Jake's disease, Jake's disease of unspecified site  -     cyanocobalamin 1,000 mcg/mL injection; INJECT ONE ML INTRAMUSCULARLY WEEKLY  -     tiZANidine (ZANAFLEX) 4 MG tablet; Take 1 tablet (4 mg total) by mouth every 8 (eight) hours as needed.  -     piroxicam (FELDENE) 20 MG capsule; Take 1 capsule (20 mg total) by mouth once daily.    Sjogren's syndrome  -     cyanocobalamin 1,000 mcg/mL injection; INJECT ONE ML INTRAMUSCULARLY WEEKLY  -     piroxicam (FELDENE) 20 MG capsule; Take 1 capsule (20 mg total) by mouth once daily.    Idiopathic progressive neuropathy  Comments:  arms and thighs and legs  Orders:  -     cyanocobalamin 1,000 mcg/mL injection; INJECT ONE ML INTRAMUSCULARLY WEEKLY  -     tiZANidine (ZANAFLEX) 4 MG tablet; Take 1 tablet (4 mg total) by mouth every 8 (eight) hours as needed.  -     piroxicam (FELDENE) 20 MG capsule; Take 1 capsule (20 mg total) by mouth once daily.    Other orders  -     modafinil (PROVIGIL) 200 MG Tab; Take 1 tablet (200 mg total) by mouth once daily.  -     methylPREDNISolone (MEDROL) 4 MG Tab; Take 2 tablets (8 mg total) by mouth once daily.  -     ketorolac injection 60 mg; Inject 2 mLs (60 mg total) into the muscle one time.  -     methylPREDNISolone acetate injection 160 mg; Inject 2 mLs (160 mg total) into the muscle one time.  -     cyanocobalamin injection 1,000 mcg; Inject 1 mL (1,000 mcg total) into the muscle one time.

## 2018-09-13 NOTE — PROGRESS NOTES
Administered 1 cc ( 1000 mcg/ml ) of b12 to the right upper outer gluteal. Informed of s/s to report verbalized understanding. No adverse reactions noted.    Lot # [id number]  Expiration nov 19    Administered 2 cc ( 80 mg/ml ) of depomedrol to the right upper outer gluteal. Informed of s/s to report verbalized understanding. No adverse reactions noted.    Lot # [id number]  Expiration 08/19    Administered 2 cc ( 30 mg/ml ) of toradol to the left upper outer gluteal. Informed of s/s to report verbalized understanding. No adverse reactions noted.    Lot # [id number]  Expiration 1 dec 2019

## 2018-11-30 DIAGNOSIS — M35.00 SJOGREN'S SYNDROME, WITH UNSPECIFIED ORGAN INVOLVEMENT: ICD-10-CM

## 2018-11-30 DIAGNOSIS — G60.3 IDIOPATHIC PROGRESSIVE NEUROPATHY: ICD-10-CM

## 2018-11-30 DIAGNOSIS — M02.30 REITER'S DISEASE, REITER'S DISEASE OF UNSPECIFIED SITE: ICD-10-CM

## 2018-11-30 DIAGNOSIS — D69.6 THROMBOCYTOPENIA: ICD-10-CM

## 2018-11-30 RX ORDER — HYDROXYCHLOROQUINE SULFATE 200 MG/1
TABLET, FILM COATED ORAL
Qty: 60 TABLET | Refills: 5 | Status: SHIPPED | OUTPATIENT
Start: 2018-11-30 | End: 2019-04-22 | Stop reason: SDUPTHER

## 2019-03-22 LAB
ALBUMIN SERPL-MCNC: 3.8 G/DL (ref 3.6–5.1)
ALBUMIN/GLOB SERPL: 1.3 (CALC) (ref 1–2.5)
ALP SERPL-CCNC: 43 U/L (ref 33–130)
ALT SERPL-CCNC: 15 U/L (ref 6–29)
ANA PAT SER IF-IMP: ABNORMAL
ANA SER QL IF: POSITIVE
ANA TITR SER IF: ABNORMAL TITER
AST SERPL-CCNC: 14 U/L (ref 10–35)
BASOPHILS # BLD AUTO: 28 CELLS/UL (ref 0–200)
BASOPHILS NFR BLD AUTO: 0.4 %
BILIRUB SERPL-MCNC: 0.3 MG/DL (ref 0.2–1.2)
BUN SERPL-MCNC: 13 MG/DL (ref 7–25)
BUN/CREAT SERPL: NORMAL (CALC) (ref 6–22)
CALCIUM SERPL-MCNC: 9 MG/DL (ref 8.6–10.4)
CHLORIDE SERPL-SCNC: 107 MMOL/L (ref 98–110)
CO2 SERPL-SCNC: 27 MMOL/L (ref 20–32)
CREAT SERPL-MCNC: 0.87 MG/DL (ref 0.5–1.05)
CRP SERPL-MCNC: 1.9 MG/L
ENA SS-A AB SER IA-ACNC: ABNORMAL AI
ENA SS-B AB SER IA-ACNC: ABNORMAL AI
EOSINOPHIL # BLD AUTO: 98 CELLS/UL (ref 15–500)
EOSINOPHIL NFR BLD AUTO: 1.4 %
ERYTHROCYTE [DISTWIDTH] IN BLOOD BY AUTOMATED COUNT: 13 % (ref 11–15)
ERYTHROCYTE [SEDIMENTATION RATE] IN BLOOD BY WESTERGREN METHOD: 14 MM/H
GFRSERPLBLD MDRD-ARVRAT: 74 ML/MIN/1.73M2
GLOBULIN SER CALC-MCNC: 2.9 G/DL (CALC) (ref 1.9–3.7)
GLUCOSE SERPL-MCNC: 90 MG/DL (ref 65–99)
HCT VFR BLD AUTO: 38 % (ref 35–45)
HGB BLD-MCNC: 12.4 G/DL (ref 11.7–15.5)
LYMPHOCYTES # BLD AUTO: 1414 CELLS/UL (ref 850–3900)
LYMPHOCYTES NFR BLD AUTO: 20.2 %
MCH RBC QN AUTO: 28.4 PG (ref 27–33)
MCHC RBC AUTO-ENTMCNC: 32.6 G/DL (ref 32–36)
MCV RBC AUTO: 87 FL (ref 80–100)
MONOCYTES # BLD AUTO: 665 CELLS/UL (ref 200–950)
MONOCYTES NFR BLD AUTO: 9.5 %
NEUTROPHILS # BLD AUTO: 4795 CELLS/UL (ref 1500–7800)
NEUTROPHILS NFR BLD AUTO: 68.5 %
PLATELET # BLD AUTO: 94 THOUSAND/UL (ref 140–400)
PMV BLD REES-ECKER: 13.4 FL (ref 7.5–12.5)
POTASSIUM SERPL-SCNC: 4.1 MMOL/L (ref 3.5–5.3)
PROT SERPL-MCNC: 6.7 G/DL (ref 6.1–8.1)
RBC # BLD AUTO: 4.37 MILLION/UL (ref 3.8–5.1)
SODIUM SERPL-SCNC: 141 MMOL/L (ref 135–146)
WBC # BLD AUTO: 7 THOUSAND/UL (ref 3.8–10.8)

## 2019-03-25 ENCOUNTER — OFFICE VISIT (OUTPATIENT)
Dept: RHEUMATOLOGY | Facility: CLINIC | Age: 54
End: 2019-03-25
Payer: COMMERCIAL

## 2019-03-25 VITALS
DIASTOLIC BLOOD PRESSURE: 82 MMHG | HEIGHT: 66 IN | BODY MASS INDEX: 40.18 KG/M2 | HEART RATE: 68 BPM | SYSTOLIC BLOOD PRESSURE: 143 MMHG | WEIGHT: 250 LBS

## 2019-03-25 DIAGNOSIS — M02.30 REITER'S DISEASE, REITER'S DISEASE OF UNSPECIFIED SITE: ICD-10-CM

## 2019-03-25 DIAGNOSIS — G60.3 IDIOPATHIC PROGRESSIVE NEUROPATHY: ICD-10-CM

## 2019-03-25 DIAGNOSIS — M47.817 LUMBAR AND SACRAL ARTHRITIS: ICD-10-CM

## 2019-03-25 DIAGNOSIS — M35.00 SJOGREN'S SYNDROME, WITH UNSPECIFIED ORGAN INVOLVEMENT: Primary | ICD-10-CM

## 2019-03-25 DIAGNOSIS — M35.09 SJOGREN'S SYNDROME WITH OTHER ORGAN INVOLVEMENT: ICD-10-CM

## 2019-03-25 DIAGNOSIS — D69.6 THROMBOCYTOPENIA: ICD-10-CM

## 2019-03-25 PROCEDURE — 99214 OFFICE O/P EST MOD 30 MIN: CPT | Mod: 25,S$GLB,, | Performed by: INTERNAL MEDICINE

## 2019-03-25 PROCEDURE — 3008F BODY MASS INDEX DOCD: CPT | Mod: CPTII,S$GLB,, | Performed by: INTERNAL MEDICINE

## 2019-03-25 PROCEDURE — 99999 PR PBB SHADOW E&M-EST. PATIENT-LVL III: ICD-10-PCS | Mod: PBBFAC,,, | Performed by: INTERNAL MEDICINE

## 2019-03-25 PROCEDURE — 96372 PR INJECTION,THERAP/PROPH/DIAG2ST, IM OR SUBCUT: ICD-10-PCS | Mod: S$GLB,,, | Performed by: INTERNAL MEDICINE

## 2019-03-25 PROCEDURE — 3008F PR BODY MASS INDEX (BMI) DOCUMENTED: ICD-10-PCS | Mod: CPTII,S$GLB,, | Performed by: INTERNAL MEDICINE

## 2019-03-25 PROCEDURE — 99214 PR OFFICE/OUTPT VISIT, EST, LEVL IV, 30-39 MIN: ICD-10-PCS | Mod: 25,S$GLB,, | Performed by: INTERNAL MEDICINE

## 2019-03-25 PROCEDURE — 96372 THER/PROPH/DIAG INJ SC/IM: CPT | Mod: S$GLB,,, | Performed by: INTERNAL MEDICINE

## 2019-03-25 PROCEDURE — 99999 PR PBB SHADOW E&M-EST. PATIENT-LVL III: CPT | Mod: PBBFAC,,, | Performed by: INTERNAL MEDICINE

## 2019-03-25 RX ORDER — KETOROLAC TROMETHAMINE 30 MG/ML
60 INJECTION, SOLUTION INTRAMUSCULAR; INTRAVENOUS
Status: COMPLETED | OUTPATIENT
Start: 2019-03-25 | End: 2019-03-25

## 2019-03-25 RX ORDER — AMLODIPINE BESYLATE 5 MG/1
5 TABLET ORAL
COMMUNITY
End: 2023-06-26 | Stop reason: SDUPTHER

## 2019-03-25 RX ORDER — METHYLPREDNISOLONE ACETATE 80 MG/ML
160 INJECTION, SUSPENSION INTRA-ARTICULAR; INTRALESIONAL; INTRAMUSCULAR; SOFT TISSUE
Status: COMPLETED | OUTPATIENT
Start: 2019-03-25 | End: 2019-03-25

## 2019-03-25 RX ORDER — CYANOCOBALAMIN 1000 UG/ML
INJECTION, SOLUTION INTRAMUSCULAR; SUBCUTANEOUS
Qty: 30 ML | Refills: 5 | Status: SHIPPED | OUTPATIENT
Start: 2019-03-25 | End: 2020-05-26

## 2019-03-25 RX ORDER — PIROXICAM 20 MG/1
20 CAPSULE ORAL DAILY
Qty: 90 CAPSULE | Refills: 3 | Status: SHIPPED | OUTPATIENT
Start: 2019-03-25 | End: 2019-06-23

## 2019-03-25 RX ADMIN — METHYLPREDNISOLONE ACETATE 160 MG: 80 INJECTION, SUSPENSION INTRA-ARTICULAR; INTRALESIONAL; INTRAMUSCULAR; SOFT TISSUE at 01:03

## 2019-03-25 RX ADMIN — KETOROLAC TROMETHAMINE 60 MG: 30 INJECTION, SOLUTION INTRAMUSCULAR; INTRAVENOUS at 01:03

## 2019-03-25 NOTE — PROGRESS NOTES
Subjective:       Patient ID: Mack Melton is a 54 y.o. female.    Chief Complaint: Disease Management (sjogren's syndrome) and Pain    Follow up:  sjogren's on plaquenil neck and lumbar pain. Doing fair on feldene and plaquenil. Patient complains of arthralgias and myalgias for which has been present for a few years. Pain is located in multiple joints, both shoulder(s), both elbow(s), both wrist(s), both MCP(s): 1st, 2nd, 3rd, 4th and 5th, both PIP(s): 1st, 2nd, 3rd, 4th and 5th, both DIP(s): 1st and 2nd, both hip(s), both knee(s) and both MTP(s): 1st, 2nd, 3rd, 4th and 5th, is described as aching, pulsating, shooting and throbbing, and is constant, moderate .     Review of Systems   Constitutional: Positive for activity change and chills. Negative for appetite change, diaphoresis and unexpected weight change.   HENT: Negative for congestion, dental problem, ear discharge, ear pain, facial swelling, mouth sores, nosebleeds, postnasal drip, rhinorrhea, sinus pressure, sneezing, sore throat, tinnitus and voice change.    Eyes: Negative for photophobia, pain, discharge, redness and itching.   Respiratory: Negative for apnea, cough, chest tightness, shortness of breath and wheezing.    Cardiovascular: Positive for leg swelling. Negative for chest pain and palpitations.   Gastrointestinal: Positive for abdominal pain. Negative for abdominal distention, constipation, diarrhea, nausea and vomiting.   Endocrine: Negative for cold intolerance, heat intolerance, polydipsia and polyuria.   Genitourinary: Negative for decreased urine volume, difficulty urinating, flank pain, frequency, hematuria and urgency.   Musculoskeletal: Positive for arthralgias, back pain, gait problem, neck pain and neck stiffness.   Skin: Negative for pallor, rash and wound.   Allergic/Immunologic: Negative for immunocompromised state.   Neurological: Positive for weakness. Negative for dizziness, tremors and numbness.   Hematological: Negative for  "adenopathy. Does not bruise/bleed easily.   Psychiatric/Behavioral: Negative for sleep disturbance. The patient is not nervous/anxious.          Objective:     BP (!) 143/82 (BP Location: Left arm, Patient Position: Sitting, BP Method: Medium (Automatic))   Pulse 68   Ht 5' 6" (1.676 m)   Wt 113.4 kg (250 lb)   BMI 40.35 kg/m²      Physical Exam   Vitals reviewed.  Constitutional: She is oriented to person, place, and time. No distress.   HENT:   Head: Normocephalic and atraumatic.   Eyes: EOM are normal. Pupils are equal, round, and reactive to light. Right eye exhibits no discharge. Left eye exhibits no discharge.   Neck: Neck supple. No thyromegaly present.   Cardiovascular: Normal rate, regular rhythm and normal heart sounds.  Exam reveals no gallop and no friction rub.    No murmur heard.  Pulmonary/Chest: Breath sounds normal. She has no wheezes. She has no rales. She exhibits no tenderness.   Abdominal: There is no tenderness. There is no rebound and no guarding.       Right Side Rheumatological Exam     Examination finds the elbow normal.    The patient is tender to palpation of the shoulder, wrist, knee, 1st PIP, 1st MCP, 2nd PIP, 2nd MCP, 3rd PIP, 3rd MCP, 4th PIP, 4th MCP, 5th PIP and 5th MCP    She has swelling of the 1st PIP, 1st MCP, 2nd PIP, 2nd MCP, 3rd PIP, 3rd MCP, 4th PIP, 4th MCP, 5th PIP and 5th MCP    Shoulder Exam   Tenderness Location: no tenderness    Range of Motion   Active abduction: abnormal   Adduction: abnormal  Sensation: normal    Knee Exam   Patellofemoral Crepitus: positive  Effusion: positive  Sensation: normal    Hip Exam   Tenderness Location: posterior  Sensation: normal    Elbow/Wrist Exam   Tenderness Location: no tenderness  Sensation: normal    Left Side Rheumatological Exam     The patient is tender to palpation of the shoulder, elbow, wrist, knee, 1st PIP, 1st MCP, 2nd PIP, 2nd MCP, 3rd PIP, 3rd MCP, 4th PIP, 4th MCP, 5th PIP and 5th MCP.    She has swelling of the " 1st PIP, 1st MCP, 2nd PIP, 2nd MCP, 3rd PIP, 3rd MCP, 4th PIP, 4th MCP, 5th PIP and 5th MCP    Shoulder Exam   Tenderness Location: no tenderness    Range of Motion   Active abduction: abnormal   Sensation: normal    Knee Exam     Patellofemoral Crepitus: positive  Effusion: positive  Sensation: normal    Hip Exam   Tenderness Location: posterior  Sensation: normal    Elbow/Wrist Exam   Sensation: normal      Back/Neck Exam   General Inspection   Gait: normal         Lymphadenopathy:     She has no cervical adenopathy.   Neurological: She is alert and oriented to person, place, and time. Gait normal.   Skin: Skin is dry. No rash noted. No erythema. There is pallor.     Psychiatric: Mood and affect normal.   Musculoskeletal: She exhibits tenderness and deformity.         Results for orders placed or performed in visit on 03/20/19   Comprehensive metabolic panel   Result Value Ref Range    Glucose 90 65 - 99 mg/dL    BUN, Bld 13 7 - 25 mg/dL    Creatinine 0.87 0.50 - 1.05 mg/dL    eGFR if non  74 > OR = 60 mL/min/1.73m2    eGFR if African American 86 > OR = 60 mL/min/1.73m2    BUN/Creatinine Ratio NOT APPLICABLE 6 - 22 (calc)    Sodium 141 135 - 146 mmol/L    Potassium 4.1 3.5 - 5.3 mmol/L    Chloride 107 98 - 110 mmol/L    CO2 27 20 - 32 mmol/L    Calcium 9.0 8.6 - 10.4 mg/dL    Total Protein 6.7 6.1 - 8.1 g/dL    Albumin 3.8 3.6 - 5.1 g/dL    Globulin, Total 2.9 1.9 - 3.7 g/dL (calc)    Albumin/Globulin Ratio 1.3 1.0 - 2.5 (calc)    Total Bilirubin 0.3 0.2 - 1.2 mg/dL    Alkaline Phosphatase 43 33 - 130 U/L    AST 14 10 - 35 U/L    ALT 15 6 - 29 U/L   Sedimentation rate, automated   Result Value Ref Range    Sed Rate 14 < OR = 30 mm/h   CBC auto differential   Result Value Ref Range    WBC 7.0 3.8 - 10.8 Thousand/uL    RBC 4.37 3.80 - 5.10 Million/uL    Hemoglobin 12.4 11.7 - 15.5 g/dL    Hematocrit 38.0 35.0 - 45.0 %    MCV 87.0 80.0 - 100.0 fL    MCH 28.4 27.0 - 33.0 pg    MCHC 32.6 32.0 - 36.0  g/dL    RDW 13.0 11.0 - 15.0 %    Platelets 94 (L) 140 - 400 Thousand/uL    MPV 13.4 (H) 7.5 - 12.5 fL    Neutrophils Absolute 4,795 1,500 - 7,800 cells/uL    Lymph # 1,414 850 - 3,900 cells/uL    Mono # 665 200 - 950 cells/uL    Eos # 98 15 - 500 cells/uL    Baso # 28 0 - 200 cells/uL    Neutrophils Relative 68.5 %    Lymph% 20.2 %    Mono% 9.5 %    Eosinophil% 1.4 %    Basophil% 0.4 %   SIENA Screen w/Reflex   Result Value Ref Range    SIENA     SJorgen's AB, Anti-SS-A/SS-B   Result Value Ref Range    Anti-SSA Antibody      Anti-SSB Antibody     C-reactive protein   Result Value Ref Range    CRP       , Details     Reading Physician Reading Date Result Priority   Gregory Walker III, MD 8/22/2018       Narrative     EXAMINATION:  US ABDOMEN COMPLETE    CLINICAL HISTORY:  low platlets abdominal pain; Thrombocytopenia, unspecified    TECHNIQUE:  Complete abdominal ultrasound (including pancreas, aorta, liver, gallbladder, common bile duct, IVC, kidneys, and spleen) was performed.    COMPARISON:  None    FINDINGS:  Pancreas is normal in appearance.    Aorta is normal in appearance and IVC visualized.    Gallbladder is normal in appearance without evidence of cholelithiasis or cholecystitis.    No intra or extrahepatic biliary dilatation the common bile duct measures 2.9 mm.    Liver is minimally prominent size measuring 17.4 cm in craniocaudal length.  Diffuse increased echotexture throughout the liver suggesting fatty infiltration.  No cystic, solid or complex nodule identified.    Kidneys are normal in symmetric in size measuring 11.1 cm in length.  No hydronephrosis or nephrolithiasis.  Normal echotexture within well maintained cortex bilaterally.    Spleen is normal in echotexture and size measuring 9.2 cm in maximum length.      Impression       Borderline hepatomegaly with probable fatty infiltration.    No other significant findings.      Electronically signed by: Gregory Walker MD  Date: 08/22/2018  Time: 19:46           Last Resulted: 08/22/18 19:46       Assessment:       1. Sjogren's syndrome, with unspecified organ involvement    2. Thrombocytopenia    3. Lumbar and sacral arthritis    4. Sjogren's syndrome with other organ involvement    5. Jake's disease, Jake's disease of unspecified site    6. Idiopathic progressive neuropathy            Plan:     Mack was seen today for disease management and pain.    Diagnoses and all orders for this visit:    Sjogren's syndrome, with unspecified organ involvement  -     cyanocobalamin 1,000 mcg/mL injection; INJECT ONE ML INTRAMUSCULARLY WEEKLY  -     piroxicam (FELDENE) 20 MG capsule; Take 1 capsule (20 mg total) by mouth once daily.  -     methylPREDNISolone acetate injection 160 mg  -     ketorolac injection 60 mg  -     CBC auto differential; Future  -     Comprehensive metabolic panel; Future  -     C-reactive protein; Future  -     PTH, intact; Future  -     Sedimentation rate, automated; Future  -     Sjogrens syndrome-A extractable nuclear antibody; Future  -     Sjogrens syndrome-B extractable nuclear antibody; Future  -     CBC auto differential  -     Comprehensive metabolic panel  -     C-reactive protein  -     PTH, intact  -     Sedimentation rate, automated  -     Sjogrens syndrome-A extractable nuclear antibody  -     Sjogrens syndrome-B extractable nuclear antibody    Thrombocytopenia  -     cyanocobalamin 1,000 mcg/mL injection; INJECT ONE ML INTRAMUSCULARLY WEEKLY  -     piroxicam (FELDENE) 20 MG capsule; Take 1 capsule (20 mg total) by mouth once daily.  -     methylPREDNISolone acetate injection 160 mg  -     ketorolac injection 60 mg  -     CBC auto differential; Future  -     Comprehensive metabolic panel; Future  -     C-reactive protein; Future  -     PTH, intact; Future  -     Sedimentation rate, automated; Future  -     Sjogrens syndrome-A extractable nuclear antibody; Future  -     Sjogrens syndrome-B extractable nuclear antibody; Future  -     CBC  auto differential  -     Comprehensive metabolic panel  -     C-reactive protein  -     PTH, intact  -     Sedimentation rate, automated  -     Sjogrens syndrome-A extractable nuclear antibody  -     Sjogrens syndrome-B extractable nuclear antibody    Lumbar and sacral arthritis  -     cyanocobalamin 1,000 mcg/mL injection; INJECT ONE ML INTRAMUSCULARLY WEEKLY  -     piroxicam (FELDENE) 20 MG capsule; Take 1 capsule (20 mg total) by mouth once daily.  -     methylPREDNISolone acetate injection 160 mg  -     ketorolac injection 60 mg  -     CBC auto differential; Future  -     Comprehensive metabolic panel; Future  -     C-reactive protein; Future  -     PTH, intact; Future  -     Sedimentation rate, automated; Future  -     Sjogrens syndrome-A extractable nuclear antibody; Future  -     Sjogrens syndrome-B extractable nuclear antibody; Future  -     CBC auto differential  -     Comprehensive metabolic panel  -     C-reactive protein  -     PTH, intact  -     Sedimentation rate, automated  -     Sjogrens syndrome-A extractable nuclear antibody  -     Sjogrens syndrome-B extractable nuclear antibody    Sjogren's syndrome with other organ involvement  -     cyanocobalamin 1,000 mcg/mL injection; INJECT ONE ML INTRAMUSCULARLY WEEKLY  -     piroxicam (FELDENE) 20 MG capsule; Take 1 capsule (20 mg total) by mouth once daily.    Jake's disease, Jake's disease of unspecified site  -     cyanocobalamin 1,000 mcg/mL injection; INJECT ONE ML INTRAMUSCULARLY WEEKLY  -     piroxicam (FELDENE) 20 MG capsule; Take 1 capsule (20 mg total) by mouth once daily.  -     methylPREDNISolone acetate injection 160 mg  -     ketorolac injection 60 mg  -     CBC auto differential; Future  -     Comprehensive metabolic panel; Future  -     C-reactive protein; Future  -     PTH, intact; Future  -     Sedimentation rate, automated; Future  -     Sjogrens syndrome-A extractable nuclear antibody; Future  -     Sjogrens syndrome-B extractable  nuclear antibody; Future  -     CBC auto differential  -     Comprehensive metabolic panel  -     C-reactive protein  -     PTH, intact  -     Sedimentation rate, automated  -     Sjogrens syndrome-A extractable nuclear antibody  -     Sjogrens syndrome-B extractable nuclear antibody    Idiopathic progressive neuropathy  Comments:  arms and thighs and legs  Orders:  -     cyanocobalamin 1,000 mcg/mL injection; INJECT ONE ML INTRAMUSCULARLY WEEKLY  -     piroxicam (FELDENE) 20 MG capsule; Take 1 capsule (20 mg total) by mouth once daily.  -     methylPREDNISolone acetate injection 160 mg  -     ketorolac injection 60 mg  -     CBC auto differential; Future  -     Comprehensive metabolic panel; Future  -     C-reactive protein; Future  -     PTH, intact; Future  -     Sedimentation rate, automated; Future  -     Sjogrens syndrome-A extractable nuclear antibody; Future  -     Sjogrens syndrome-B extractable nuclear antibody; Future  -     CBC auto differential  -     Comprehensive metabolic panel  -     C-reactive protein  -     PTH, intact  -     Sedimentation rate, automated  -     Sjogrens syndrome-A extractable nuclear antibody  -     Sjogrens syndrome-B extractable nuclear antibody

## 2019-03-25 NOTE — PROGRESS NOTES
Administered  cc DepoMedrol 80mg/cc  To right upper outer gluteal. Pt tolerated well. No acute reaction noted to site. Pt instructed on S/S to report. Advised patient to wait in lobby 15 minutes after receiving injection to monitor for any reactions..  Pt verbalized understanding.     Lot: [id number]  Exp: 03/20  Administered 2 cc  Toradol 30mg/cc  to left upper outer gluteal. Pt tolerated well. No acute reaction noted to site. Pt instructed on S/S to report. Advised patient to wait in lobby 15 minutes after receiving injection to monitor for any reactions. Pt verbalized understanding.     Lot: [id number]  Exp: 10/2020

## 2019-04-21 RX ORDER — MODAFINIL 200 MG/1
TABLET ORAL
Qty: 30 TABLET | Refills: 3 | Status: SHIPPED | OUTPATIENT
Start: 2019-04-21 | End: 2019-08-20 | Stop reason: SDUPTHER

## 2019-04-22 DIAGNOSIS — G60.3 IDIOPATHIC PROGRESSIVE NEUROPATHY: ICD-10-CM

## 2019-04-22 DIAGNOSIS — D69.6 THROMBOCYTOPENIA: ICD-10-CM

## 2019-04-22 DIAGNOSIS — M35.00 SJOGREN'S SYNDROME, WITH UNSPECIFIED ORGAN INVOLVEMENT: ICD-10-CM

## 2019-04-22 DIAGNOSIS — M02.30 REITER'S DISEASE, REITER'S DISEASE OF UNSPECIFIED SITE: ICD-10-CM

## 2019-04-22 RX ORDER — HYDROXYCHLOROQUINE SULFATE 200 MG/1
200 TABLET, FILM COATED ORAL 2 TIMES DAILY
Qty: 60 TABLET | Refills: 5 | Status: SHIPPED | OUTPATIENT
Start: 2019-04-22 | End: 2019-08-20 | Stop reason: SDUPTHER

## 2019-08-20 ENCOUNTER — OFFICE VISIT (OUTPATIENT)
Dept: RHEUMATOLOGY | Facility: CLINIC | Age: 54
End: 2019-08-20
Payer: COMMERCIAL

## 2019-08-20 VITALS
WEIGHT: 250 LBS | HEIGHT: 66 IN | BODY MASS INDEX: 40.18 KG/M2 | HEART RATE: 65 BPM | DIASTOLIC BLOOD PRESSURE: 68 MMHG | SYSTOLIC BLOOD PRESSURE: 129 MMHG

## 2019-08-20 DIAGNOSIS — D69.6 THROMBOCYTOPENIA: ICD-10-CM

## 2019-08-20 DIAGNOSIS — E03.9 HYPOTHYROIDISM, UNSPECIFIED TYPE: ICD-10-CM

## 2019-08-20 DIAGNOSIS — G60.3 IDIOPATHIC PROGRESSIVE NEUROPATHY: ICD-10-CM

## 2019-08-20 DIAGNOSIS — M02.30 REITER'S DISEASE, REITER'S DISEASE OF UNSPECIFIED SITE: ICD-10-CM

## 2019-08-20 DIAGNOSIS — C73 FOLLICULAR CARCINOMA OF THYROID: ICD-10-CM

## 2019-08-20 DIAGNOSIS — M35.00 SJOGREN'S SYNDROME, WITH UNSPECIFIED ORGAN INVOLVEMENT: Primary | ICD-10-CM

## 2019-08-20 DIAGNOSIS — D69.3 CHRONIC ITP (IDIOPATHIC THROMBOCYTOPENIA): ICD-10-CM

## 2019-08-20 PROCEDURE — 3008F BODY MASS INDEX DOCD: CPT | Mod: CPTII,S$GLB,, | Performed by: INTERNAL MEDICINE

## 2019-08-20 PROCEDURE — 3008F PR BODY MASS INDEX (BMI) DOCUMENTED: ICD-10-PCS | Mod: CPTII,S$GLB,, | Performed by: INTERNAL MEDICINE

## 2019-08-20 PROCEDURE — 99999 PR PBB SHADOW E&M-EST. PATIENT-LVL III: CPT | Mod: PBBFAC,,, | Performed by: INTERNAL MEDICINE

## 2019-08-20 PROCEDURE — 96372 PR INJECTION,THERAP/PROPH/DIAG2ST, IM OR SUBCUT: ICD-10-PCS | Mod: S$GLB,,, | Performed by: INTERNAL MEDICINE

## 2019-08-20 PROCEDURE — 99215 OFFICE O/P EST HI 40 MIN: CPT | Mod: 25,S$GLB,, | Performed by: INTERNAL MEDICINE

## 2019-08-20 PROCEDURE — 99215 PR OFFICE/OUTPT VISIT, EST, LEVL V, 40-54 MIN: ICD-10-PCS | Mod: 25,S$GLB,, | Performed by: INTERNAL MEDICINE

## 2019-08-20 PROCEDURE — 99999 PR PBB SHADOW E&M-EST. PATIENT-LVL III: ICD-10-PCS | Mod: PBBFAC,,, | Performed by: INTERNAL MEDICINE

## 2019-08-20 PROCEDURE — 96372 THER/PROPH/DIAG INJ SC/IM: CPT | Mod: S$GLB,,, | Performed by: INTERNAL MEDICINE

## 2019-08-20 RX ORDER — MODAFINIL 200 MG/1
200 TABLET ORAL DAILY
Qty: 30 TABLET | Refills: 5 | Status: SHIPPED | OUTPATIENT
Start: 2019-08-20 | End: 2020-02-27

## 2019-08-20 RX ORDER — FOLIC ACID 1 MG/1
1 TABLET ORAL DAILY
Qty: 30 TABLET | Refills: 6 | Status: SHIPPED | OUTPATIENT
Start: 2019-08-20 | End: 2020-04-05

## 2019-08-20 RX ORDER — METHYLPREDNISOLONE ACETATE 80 MG/ML
160 INJECTION, SUSPENSION INTRA-ARTICULAR; INTRALESIONAL; INTRAMUSCULAR; SOFT TISSUE
Status: COMPLETED | OUTPATIENT
Start: 2019-08-20 | End: 2019-08-20

## 2019-08-20 RX ORDER — NABUMETONE 750 MG/1
750 TABLET, FILM COATED ORAL 2 TIMES DAILY
COMMUNITY
End: 2020-07-16 | Stop reason: SDUPTHER

## 2019-08-20 RX ORDER — TIZANIDINE 4 MG/1
4 TABLET ORAL EVERY 8 HOURS PRN
Qty: 90 TABLET | Refills: 12 | Status: SHIPPED | OUTPATIENT
Start: 2019-08-20 | End: 2020-07-16 | Stop reason: SDUPTHER

## 2019-08-20 RX ORDER — HYDROXYCHLOROQUINE SULFATE 200 MG/1
200 TABLET, FILM COATED ORAL 2 TIMES DAILY
Qty: 60 TABLET | Refills: 5 | Status: SHIPPED | OUTPATIENT
Start: 2019-08-20 | End: 2020-07-16 | Stop reason: SDUPTHER

## 2019-08-20 RX ORDER — CYANOCOBALAMIN 1000 UG/ML
1000 INJECTION, SOLUTION INTRAMUSCULAR; SUBCUTANEOUS
Status: COMPLETED | OUTPATIENT
Start: 2019-08-20 | End: 2019-08-20

## 2019-08-20 RX ORDER — KETOROLAC TROMETHAMINE 30 MG/ML
60 INJECTION, SOLUTION INTRAMUSCULAR; INTRAVENOUS
Status: COMPLETED | OUTPATIENT
Start: 2019-08-20 | End: 2019-08-20

## 2019-08-20 RX ADMIN — KETOROLAC TROMETHAMINE 60 MG: 30 INJECTION, SOLUTION INTRAMUSCULAR; INTRAVENOUS at 05:08

## 2019-08-20 RX ADMIN — CYANOCOBALAMIN 1000 MCG: 1000 INJECTION, SOLUTION INTRAMUSCULAR; SUBCUTANEOUS at 05:08

## 2019-08-20 RX ADMIN — METHYLPREDNISOLONE ACETATE 160 MG: 80 INJECTION, SUSPENSION INTRA-ARTICULAR; INTRALESIONAL; INTRAMUSCULAR; SOFT TISSUE at 05:08

## 2019-08-20 ASSESSMENT — ROUTINE ASSESSMENT OF PATIENT INDEX DATA (RAPID3)
PATIENT GLOBAL ASSESSMENT SCORE: 6
PSYCHOLOGICAL DISTRESS SCORE: 2.2
TOTAL RAPID3 SCORE: 3.89
MDHAQ FUNCTION SCORE: .5
PAIN SCORE: 4

## 2019-08-20 NOTE — PROGRESS NOTES
Subjective:       Patient ID: Mack Melton is a 54 y.o. female.    Chief Complaint: Pain and Swelling    Follow up:  sjogren's on plaquenil neck and lumbar pain. C/o heaviness of the body  and fatigue and joint pain plaquenil. Patient complains of arthralgias and myalgias for which has been present for a few years. Pain is located in multiple joints, both shoulder(s), both elbow(s), both wrist(s), both MCP(s): 1st, 2nd, 3rd, 4th and 5th, both PIP(s): 1st, 2nd, 3rd, 4th and 5th, both DIP(s): 1st and 2nd, both hip(s), both knee(s) and both MTP(s): 1st, 2nd, 3rd, 4th and 5th, is described as aching, pulsating, shooting and throbbing, and is constant, moderate .  And neuropathy of the foot B top of foot    Review of Systems   Constitutional: Positive for activity change and chills. Negative for appetite change, diaphoresis and unexpected weight change.   HENT: Negative for congestion, dental problem, ear discharge, ear pain, facial swelling, mouth sores, nosebleeds, postnasal drip, rhinorrhea, sinus pressure, sneezing, sore throat, tinnitus and voice change.    Eyes: Negative for photophobia, pain, discharge, redness and itching.   Respiratory: Negative for apnea, cough, chest tightness, shortness of breath and wheezing.    Cardiovascular: Positive for leg swelling. Negative for chest pain and palpitations.   Gastrointestinal: Positive for abdominal pain. Negative for abdominal distention, constipation, diarrhea, nausea and vomiting.   Endocrine: Negative for cold intolerance, heat intolerance, polydipsia and polyuria.   Genitourinary: Negative for decreased urine volume, difficulty urinating, flank pain, frequency, hematuria and urgency.   Musculoskeletal: Positive for arthralgias, back pain, gait problem, neck pain and neck stiffness.   Skin: Negative for pallor, rash and wound.   Allergic/Immunologic: Negative for immunocompromised state.   Neurological: Positive for weakness. Negative for dizziness, tremors and  "numbness.   Hematological: Negative for adenopathy. Does not bruise/bleed easily.   Psychiatric/Behavioral: Negative for sleep disturbance. The patient is not nervous/anxious.          Objective:     /68   Pulse 65   Ht 5' 6" (1.676 m)   Wt 113.4 kg (250 lb)   BMI 40.35 kg/m²      Physical Exam   Vitals reviewed.  Constitutional: She is oriented to person, place, and time. No distress.   HENT:   Head: Normocephalic and atraumatic.   Eyes: EOM are normal. Pupils are equal, round, and reactive to light. Right eye exhibits no discharge. Left eye exhibits no discharge.   Neck: Neck supple. No thyromegaly present.   Cardiovascular: Normal rate, regular rhythm and normal heart sounds.  Exam reveals no gallop and no friction rub.    No murmur heard.  Pulmonary/Chest: Breath sounds normal. She has no wheezes. She has no rales. She exhibits no tenderness.   Abdominal: There is no tenderness. There is no rebound and no guarding.       Right Side Rheumatological Exam     Examination finds the elbow normal.    The patient is tender to palpation of the shoulder, wrist, knee, 1st PIP, 1st MCP, 2nd PIP, 2nd MCP, 3rd PIP, 3rd MCP, 4th PIP, 4th MCP, 5th PIP and 5th MCP    She has swelling of the 1st PIP, 1st MCP, 2nd PIP, 2nd MCP, 3rd PIP, 3rd MCP, 4th PIP, 4th MCP, 5th PIP and 5th MCP    Shoulder Exam   Tenderness Location: no tenderness    Range of Motion   Active abduction: abnormal   Adduction: abnormal  Sensation: normal    Knee Exam   Patellofemoral Crepitus: positive  Effusion: positive  Sensation: normal    Hip Exam   Tenderness Location: posterior  Sensation: normal    Elbow/Wrist Exam   Tenderness Location: no tenderness  Sensation: normal    Left Side Rheumatological Exam     The patient is tender to palpation of the shoulder, elbow, wrist, knee, 1st PIP, 1st MCP, 2nd PIP, 2nd MCP, 3rd PIP, 3rd MCP, 4th PIP, 4th MCP, 5th PIP and 5th MCP.    She has swelling of the 1st PIP, 1st MCP, 2nd PIP, 2nd MCP, 3rd PIP, 3rd " MCP, 4th PIP, 4th MCP, 5th PIP and 5th MCP    Shoulder Exam   Tenderness Location: no tenderness    Range of Motion   Active abduction: abnormal   Sensation: normal    Knee Exam     Patellofemoral Crepitus: positive  Effusion: positive  Sensation: normal    Hip Exam   Tenderness Location: posterior  Sensation: normal    Elbow/Wrist Exam   Sensation: normal      Back/Neck Exam   General Inspection   Gait: normal         Lymphadenopathy:     She has no cervical adenopathy.   Neurological: She is alert and oriented to person, place, and time. Gait normal.   Skin: Skin is dry. No rash noted. No erythema. There is pallor.     Psychiatric: Mood and affect normal.   Musculoskeletal: She exhibits tenderness and deformity.         Results for orders placed or performed in visit on 03/20/19   Comprehensive metabolic panel   Result Value Ref Range    Glucose 90 65 - 99 mg/dL    BUN, Bld 13 7 - 25 mg/dL    Creatinine 0.87 0.50 - 1.05 mg/dL    eGFR if non  74 > OR = 60 mL/min/1.73m2    eGFR if African American 86 > OR = 60 mL/min/1.73m2    BUN/Creatinine Ratio NOT APPLICABLE 6 - 22 (calc)    Sodium 141 135 - 146 mmol/L    Potassium 4.1 3.5 - 5.3 mmol/L    Chloride 107 98 - 110 mmol/L    CO2 27 20 - 32 mmol/L    Calcium 9.0 8.6 - 10.4 mg/dL    Total Protein 6.7 6.1 - 8.1 g/dL    Albumin 3.8 3.6 - 5.1 g/dL    Globulin, Total 2.9 1.9 - 3.7 g/dL (calc)    Albumin/Globulin Ratio 1.3 1.0 - 2.5 (calc)    Total Bilirubin 0.3 0.2 - 1.2 mg/dL    Alkaline Phosphatase 43 33 - 130 U/L    AST 14 10 - 35 U/L    ALT 15 6 - 29 U/L   Sedimentation rate, automated   Result Value Ref Range    Sed Rate 14 < OR = 30 mm/h   CBC auto differential   Result Value Ref Range    WBC 7.0 3.8 - 10.8 Thousand/uL    RBC 4.37 3.80 - 5.10 Million/uL    Hemoglobin 12.4 11.7 - 15.5 g/dL    Hematocrit 38.0 35.0 - 45.0 %    Mean Corpuscular Volume 87.0 80.0 - 100.0 fL    Mean Corpuscular Hemoglobin 28.4 27.0 - 33.0 pg    Mean Corpuscular Hemoglobin  Conc 32.6 32.0 - 36.0 g/dL    RDW 13.0 11.0 - 15.0 %    Platelets 94 (L) 140 - 400 Thousand/uL    MPV 13.4 (H) 7.5 - 12.5 fL    Neutrophils Absolute 4,795 1,500 - 7,800 cells/uL    Lymph # 1,414 850 - 3,900 cells/uL    Mono # 665 200 - 950 cells/uL    Eos # 98 15 - 500 cells/uL    Baso # 28 0 - 200 cells/uL    Neutrophils Relative 68.5 %    Lymph% 20.2 %    Mono% 9.5 %    Eosinophil% 1.4 %    Basophil% 0.4 %   SIENA Screen w/Reflex   Result Value Ref Range    SIENA POSITIVE (A) NEGATIVE   SJorgen's AB, Anti-SS-A/SS-B   Result Value Ref Range    Anti-SSA Antibody >8.0 POS (A) <1.0 NEG AI    Anti-SSB Antibody <1.0 NEG <1.0 NEG AI   C-reactive protein   Result Value Ref Range    CRP 1.9 <8.0 mg/L   Antinuclear Antibodies Titer and Pattern   Result Value Ref Range    SIENA Pattern SPECKLED (A)     SIENA Titer 1:40 (H) titer     ,       Assessment:       1. Sjogren's syndrome, with unspecified organ involvement    2. Thrombocytopenia    3. Chronic ITP (idiopathic thrombocytopenia)    4. Idiopathic progressive neuropathy    5. Hypothyroidism, unspecified type    6. Follicular carcinoma of thyroid    7. Jake's disease, Jake's disease of unspecified site    8. Idiopathic progressive neuropathy            Plan:     Mack was seen today for pain and swelling.    Diagnoses and all orders for this visit:    Sjogren's syndrome, with unspecified organ involvement  -     Protein electrophoresis, serum; Future  -     PLT. AUTOANTIBODY, CELL BOUND (DIRECT); Future  -     CBC auto differential; Future  -     Comprehensive metabolic panel; Future  -     SIENA Screen w/Reflex; Future  -     Sjogrens syndrome-A extractable nuclear antibody; Future  -     Sjogrens syndrome-B extractable nuclear antibody; Future  -     Sedimentation rate, automated; Future  -     C-reactive protein; Future  -     TSH; Future  -     T4, free; Future  -     T3, free; Future  -     Hemoglobin A1c; Future  -     PTH, intact; Future  -     Vitamin D; Future  -      Protein electrophoresis, serum  -     PLT. AUTOANTIBODY, CELL BOUND (DIRECT)  -     CBC auto differential  -     Comprehensive metabolic panel  -     SIENA Screen w/Reflex  -     Sjogrens syndrome-A extractable nuclear antibody  -     Sjogrens syndrome-B extractable nuclear antibody  -     Sedimentation rate, automated  -     C-reactive protein  -     TSH  -     T4, free  -     T3, free  -     Hemoglobin A1c  -     PTH, intact  -     Vitamin D  -     modafinil (PROVIGIL) 200 MG Tab; Take 1 tablet (200 mg total) by mouth once daily.  -     folic acid (FOLVITE) 1 MG tablet; Take 1 tablet (1 mg total) by mouth once daily.  -     hydroxychloroquine (PLAQUENIL) 200 mg tablet; Take 1 tablet (200 mg total) by mouth 2 (two) times daily.  -     tiZANidine (ZANAFLEX) 4 MG tablet; Take 1 tablet (4 mg total) by mouth every 8 (eight) hours as needed.  -     ketorolac injection 60 mg  -     methylPREDNISolone acetate injection 160 mg  -     cyanocobalamin injection 1,000 mcg    Thrombocytopenia  -     Protein electrophoresis, serum; Future  -     PLT. AUTOANTIBODY, CELL BOUND (DIRECT); Future  -     CBC auto differential; Future  -     Comprehensive metabolic panel; Future  -     SIENA Screen w/Reflex; Future  -     Sjogrens syndrome-A extractable nuclear antibody; Future  -     Sjogrens syndrome-B extractable nuclear antibody; Future  -     Sedimentation rate, automated; Future  -     C-reactive protein; Future  -     TSH; Future  -     T4, free; Future  -     T3, free; Future  -     Hemoglobin A1c; Future  -     PTH, intact; Future  -     Vitamin D; Future  -     Protein electrophoresis, serum  -     PLT. AUTOANTIBODY, CELL BOUND (DIRECT)  -     CBC auto differential  -     Comprehensive metabolic panel  -     SIENA Screen w/Reflex  -     Sjogrens syndrome-A extractable nuclear antibody  -     Sjogrens syndrome-B extractable nuclear antibody  -     Sedimentation rate, automated  -     C-reactive protein  -     TSH  -     T4, free  -      T3, free  -     Hemoglobin A1c  -     PTH, intact  -     Vitamin D  -     modafinil (PROVIGIL) 200 MG Tab; Take 1 tablet (200 mg total) by mouth once daily.  -     folic acid (FOLVITE) 1 MG tablet; Take 1 tablet (1 mg total) by mouth once daily.  -     hydroxychloroquine (PLAQUENIL) 200 mg tablet; Take 1 tablet (200 mg total) by mouth 2 (two) times daily.  -     tiZANidine (ZANAFLEX) 4 MG tablet; Take 1 tablet (4 mg total) by mouth every 8 (eight) hours as needed.  -     ketorolac injection 60 mg  -     methylPREDNISolone acetate injection 160 mg  -     cyanocobalamin injection 1,000 mcg    Chronic ITP (idiopathic thrombocytopenia)  -     Protein electrophoresis, serum; Future  -     PLT. AUTOANTIBODY, CELL BOUND (DIRECT); Future  -     CBC auto differential; Future  -     Comprehensive metabolic panel; Future  -     SIENA Screen w/Reflex; Future  -     Sjogrens syndrome-A extractable nuclear antibody; Future  -     Sjogrens syndrome-B extractable nuclear antibody; Future  -     Sedimentation rate, automated; Future  -     C-reactive protein; Future  -     TSH; Future  -     T4, free; Future  -     T3, free; Future  -     Hemoglobin A1c; Future  -     PTH, intact; Future  -     Vitamin D; Future  -     Protein electrophoresis, serum  -     PLT. AUTOANTIBODY, CELL BOUND (DIRECT)  -     CBC auto differential  -     Comprehensive metabolic panel  -     SIENA Screen w/Reflex  -     Sjogrens syndrome-A extractable nuclear antibody  -     Sjogrens syndrome-B extractable nuclear antibody  -     Sedimentation rate, automated  -     C-reactive protein  -     TSH  -     T4, free  -     T3, free  -     Hemoglobin A1c  -     PTH, intact  -     Vitamin D  -     modafinil (PROVIGIL) 200 MG Tab; Take 1 tablet (200 mg total) by mouth once daily.  -     folic acid (FOLVITE) 1 MG tablet; Take 1 tablet (1 mg total) by mouth once daily.  -     hydroxychloroquine (PLAQUENIL) 200 mg tablet; Take 1 tablet (200 mg total) by mouth 2 (two)  times daily.  -     tiZANidine (ZANAFLEX) 4 MG tablet; Take 1 tablet (4 mg total) by mouth every 8 (eight) hours as needed.  -     ketorolac injection 60 mg  -     methylPREDNISolone acetate injection 160 mg  -     cyanocobalamin injection 1,000 mcg    Idiopathic progressive neuropathy  -     Protein electrophoresis, serum; Future  -     PLT. AUTOANTIBODY, CELL BOUND (DIRECT); Future  -     CBC auto differential; Future  -     Comprehensive metabolic panel; Future  -     SIENA Screen w/Reflex; Future  -     Sjogrens syndrome-A extractable nuclear antibody; Future  -     Sjogrens syndrome-B extractable nuclear antibody; Future  -     Sedimentation rate, automated; Future  -     C-reactive protein; Future  -     TSH; Future  -     T4, free; Future  -     T3, free; Future  -     Hemoglobin A1c; Future  -     PTH, intact; Future  -     Vitamin D; Future  -     Protein electrophoresis, serum  -     PLT. AUTOANTIBODY, CELL BOUND (DIRECT)  -     CBC auto differential  -     Comprehensive metabolic panel  -     SIENA Screen w/Reflex  -     Sjogrens syndrome-A extractable nuclear antibody  -     Sjogrens syndrome-B extractable nuclear antibody  -     Sedimentation rate, automated  -     C-reactive protein  -     TSH  -     T4, free  -     T3, free  -     Hemoglobin A1c  -     PTH, intact  -     Vitamin D  -     modafinil (PROVIGIL) 200 MG Tab; Take 1 tablet (200 mg total) by mouth once daily.  -     folic acid (FOLVITE) 1 MG tablet; Take 1 tablet (1 mg total) by mouth once daily.  -     hydroxychloroquine (PLAQUENIL) 200 mg tablet; Take 1 tablet (200 mg total) by mouth 2 (two) times daily.  -     tiZANidine (ZANAFLEX) 4 MG tablet; Take 1 tablet (4 mg total) by mouth every 8 (eight) hours as needed.  -     ketorolac injection 60 mg  -     methylPREDNISolone acetate injection 160 mg  -     cyanocobalamin injection 1,000 mcg    Hypothyroidism, unspecified type  -     Protein electrophoresis, serum; Future  -     PLT. AUTOANTIBODY,  CELL BOUND (DIRECT); Future  -     CBC auto differential; Future  -     Comprehensive metabolic panel; Future  -     SIENA Screen w/Reflex; Future  -     Sjogrens syndrome-A extractable nuclear antibody; Future  -     Sjogrens syndrome-B extractable nuclear antibody; Future  -     Sedimentation rate, automated; Future  -     C-reactive protein; Future  -     TSH; Future  -     T4, free; Future  -     T3, free; Future  -     Hemoglobin A1c; Future  -     PTH, intact; Future  -     Vitamin D; Future  -     Protein electrophoresis, serum  -     PLT. AUTOANTIBODY, CELL BOUND (DIRECT)  -     CBC auto differential  -     Comprehensive metabolic panel  -     SIENA Screen w/Reflex  -     Sjogrens syndrome-A extractable nuclear antibody  -     Sjogrens syndrome-B extractable nuclear antibody  -     Sedimentation rate, automated  -     C-reactive protein  -     TSH  -     T4, free  -     T3, free  -     Hemoglobin A1c  -     PTH, intact  -     Vitamin D  -     modafinil (PROVIGIL) 200 MG Tab; Take 1 tablet (200 mg total) by mouth once daily.  -     folic acid (FOLVITE) 1 MG tablet; Take 1 tablet (1 mg total) by mouth once daily.  -     hydroxychloroquine (PLAQUENIL) 200 mg tablet; Take 1 tablet (200 mg total) by mouth 2 (two) times daily.  -     tiZANidine (ZANAFLEX) 4 MG tablet; Take 1 tablet (4 mg total) by mouth every 8 (eight) hours as needed.  -     ketorolac injection 60 mg  -     methylPREDNISolone acetate injection 160 mg  -     cyanocobalamin injection 1,000 mcg    Follicular carcinoma of thyroid  Comments:  in remission  Orders:  -     Protein electrophoresis, serum; Future  -     PLT. AUTOANTIBODY, CELL BOUND (DIRECT); Future  -     CBC auto differential; Future  -     Comprehensive metabolic panel; Future  -     SIENA Screen w/Reflex; Future  -     Sjogrens syndrome-A extractable nuclear antibody; Future  -     Sjogrens syndrome-B extractable nuclear antibody; Future  -     Sedimentation rate, automated; Future  -      C-reactive protein; Future  -     TSH; Future  -     T4, free; Future  -     T3, free; Future  -     Hemoglobin A1c; Future  -     PTH, intact; Future  -     Vitamin D; Future  -     Protein electrophoresis, serum  -     PLT. AUTOANTIBODY, CELL BOUND (DIRECT)  -     CBC auto differential  -     Comprehensive metabolic panel  -     SIENA Screen w/Reflex  -     Sjogrens syndrome-A extractable nuclear antibody  -     Sjogrens syndrome-B extractable nuclear antibody  -     Sedimentation rate, automated  -     C-reactive protein  -     TSH  -     T4, free  -     T3, free  -     Hemoglobin A1c  -     PTH, intact  -     Vitamin D  -     modafinil (PROVIGIL) 200 MG Tab; Take 1 tablet (200 mg total) by mouth once daily.  -     folic acid (FOLVITE) 1 MG tablet; Take 1 tablet (1 mg total) by mouth once daily.  -     hydroxychloroquine (PLAQUENIL) 200 mg tablet; Take 1 tablet (200 mg total) by mouth 2 (two) times daily.  -     tiZANidine (ZANAFLEX) 4 MG tablet; Take 1 tablet (4 mg total) by mouth every 8 (eight) hours as needed.  -     ketorolac injection 60 mg  -     methylPREDNISolone acetate injection 160 mg  -     cyanocobalamin injection 1,000 mcg    Jake's disease, Jake's disease of unspecified site    Idiopathic progressive neuropathy  Comments:  arms and thighs and legs  Orders:  -     Protein electrophoresis, serum; Future  -     PLT. AUTOANTIBODY, CELL BOUND (DIRECT); Future  -     CBC auto differential; Future  -     Comprehensive metabolic panel; Future  -     SIENA Screen w/Reflex; Future  -     Sjogrens syndrome-A extractable nuclear antibody; Future  -     Sjogrens syndrome-B extractable nuclear antibody; Future  -     Sedimentation rate, automated; Future  -     C-reactive protein; Future  -     TSH; Future  -     T4, free; Future  -     T3, free; Future  -     Hemoglobin A1c; Future  -     PTH, intact; Future  -     Vitamin D; Future  -     Protein electrophoresis, serum  -     PLT. AUTOANTIBODY, CELL BOUND  (DIRECT)  -     CBC auto differential  -     Comprehensive metabolic panel  -     SIENA Screen w/Reflex  -     Sjogrens syndrome-A extractable nuclear antibody  -     Sjogrens syndrome-B extractable nuclear antibody  -     Sedimentation rate, automated  -     C-reactive protein  -     TSH  -     T4, free  -     T3, free  -     Hemoglobin A1c  -     PTH, intact  -     Vitamin D  -     modafinil (PROVIGIL) 200 MG Tab; Take 1 tablet (200 mg total) by mouth once daily.  -     folic acid (FOLVITE) 1 MG tablet; Take 1 tablet (1 mg total) by mouth once daily.  -     hydroxychloroquine (PLAQUENIL) 200 mg tablet; Take 1 tablet (200 mg total) by mouth 2 (two) times daily.  -     tiZANidine (ZANAFLEX) 4 MG tablet; Take 1 tablet (4 mg total) by mouth every 8 (eight) hours as needed.  -     ketorolac injection 60 mg  -     methylPREDNISolone acetate injection 160 mg  -     cyanocobalamin injection 1,000 mcg      If arthritis is bad and or plts drop consider rituxan with close  Follow w/ Dr Hull

## 2019-08-20 NOTE — PROGRESS NOTES
Administered 1 cc ( 1000 mcg/ml ) of b12 to the right upper outer gluteal. Informed of s/s to report verbalized understanding. No adverse reactions noted.    Lot # [id number]  Expiration jan 21    Administered 2 cc ( 80 mg/ml ) of depomedrol to the right upper outer gluteal. Informed of s/s to report verbalized understanding. No adverse reactions noted.    Lot # [id number]  Expiration 10/20    Administered 2 cc ( 30 mg/ml ) of toradol to the left upper outer gluteal. Informed of s/s to report verbalized understanding. No adverse reactions noted.    Lot # [id number]  Expiration 1 jul 2020

## 2019-10-22 LAB
HBA1C MFR BLD: 5.5 % OF TOTAL HGB
T4 FREE SERPL-MCNC: 1 NG/DL (ref 0.8–1.8)
TSH SERPL-ACNC: 0.02 MIU/L (ref 0.4–4.5)

## 2019-10-25 LAB
25(OH)D3 SERPL-MCNC: 68 NG/ML (ref 30–100)
ALBUMIN SERPL ELPH-MCNC: 3.6 G/DL (ref 3.8–4.8)
ALBUMIN SERPL-MCNC: 3.9 G/DL (ref 3.6–5.1)
ALBUMIN/GLOB SERPL: 1.3 (CALC) (ref 1–2.5)
ALP SERPL-CCNC: 43 U/L (ref 33–130)
ALPHA1 GLOB SERPL ELPH-MCNC: 0.2 G/DL (ref 0.2–0.3)
ALPHA2 GLOB SERPL ELPH-MCNC: 0.9 G/DL (ref 0.5–0.9)
ALT SERPL-CCNC: 13 U/L (ref 6–29)
ANA PAT SER IF-IMP: ABNORMAL
ANA SER QL IF: POSITIVE
ANA TITR SER IF: ABNORMAL TITER
AST SERPL-CCNC: 13 U/L (ref 10–35)
BASOPHILS # BLD AUTO: 33 CELLS/UL (ref 0–200)
BASOPHILS NFR BLD AUTO: 0.5 %
BETA1 GLOB SERPL ELPH-MCNC: 0.4 G/DL (ref 0.4–0.6)
BETA2 GLOB SERPL ELPH-MCNC: 0.4 G/DL (ref 0.2–0.5)
BILIRUB SERPL-MCNC: 0.2 MG/DL (ref 0.2–1.2)
BUN SERPL-MCNC: 22 MG/DL (ref 7–25)
BUN/CREAT SERPL: NORMAL (CALC) (ref 6–22)
CALCIUM SERPL-MCNC: 8.8 MG/DL (ref 8.6–10.4)
CHLORIDE SERPL-SCNC: 109 MMOL/L (ref 98–110)
CO2 SERPL-SCNC: 25 MMOL/L (ref 20–32)
CREAT SERPL-MCNC: 0.86 MG/DL (ref 0.5–1.05)
CRP SERPL-MCNC: 0.7 MG/L
ENA SS-A AB SER IA-ACNC: ABNORMAL AI
ENA SS-B AB SER IA-ACNC: NORMAL AI
EOSINOPHIL # BLD AUTO: 79 CELLS/UL (ref 15–500)
EOSINOPHIL NFR BLD AUTO: 1.2 %
ERYTHROCYTE [DISTWIDTH] IN BLOOD BY AUTOMATED COUNT: 13.1 % (ref 11–15)
ERYTHROCYTE [SEDIMENTATION RATE] IN BLOOD BY WESTERGREN METHOD: 9 MM/H
GAMMA GLOB SERPL ELPH-MCNC: 1.3 G/DL (ref 0.8–1.7)
GFRSERPLBLD MDRD-ARVRAT: 76 ML/MIN/1.73M2
GLOBULIN SER CALC-MCNC: 3 G/DL (CALC) (ref 1.9–3.7)
GLUCOSE SERPL-MCNC: 91 MG/DL (ref 65–99)
HCT VFR BLD AUTO: 35.9 % (ref 35–45)
HGB BLD-MCNC: 11.8 G/DL (ref 11.7–15.5)
LYMPHOCYTES # BLD AUTO: 1247 CELLS/UL (ref 850–3900)
LYMPHOCYTES NFR BLD AUTO: 18.9 %
MCH RBC QN AUTO: 28.9 PG (ref 27–33)
MCHC RBC AUTO-ENTMCNC: 32.9 G/DL (ref 32–36)
MCV RBC AUTO: 87.8 FL (ref 80–100)
MONOCYTES # BLD AUTO: 752 CELLS/UL (ref 200–950)
MONOCYTES NFR BLD AUTO: 11.4 %
NEUTROPHILS # BLD AUTO: 4488 CELLS/UL (ref 1500–7800)
NEUTROPHILS NFR BLD AUTO: 68 %
PLATELET # BLD AUTO: 124 THOUSAND/UL (ref 140–400)
PMV BLD REES-ECKER: 12.8 FL (ref 7.5–12.5)
POTASSIUM SERPL-SCNC: 4.2 MMOL/L (ref 3.5–5.3)
PROT PATTERN SERPL ELPH-IMP: ABNORMAL
PROT SERPL-MCNC: 6.8 G/DL (ref 6.1–8.1)
PROT SERPL-MCNC: 6.9 G/DL (ref 6.1–8.1)
PTH-INTACT SERPL-MCNC: 49 PG/ML (ref 14–64)
RBC # BLD AUTO: 4.09 MILLION/UL (ref 3.8–5.1)
SODIUM SERPL-SCNC: 140 MMOL/L (ref 135–146)
T3FREE SERPL-MCNC: 2.7 PG/ML (ref 2.3–4.2)
WBC # BLD AUTO: 6.6 THOUSAND/UL (ref 3.8–10.8)

## 2019-11-20 RX ORDER — METHYLPREDNISOLONE 4 MG/1
TABLET ORAL
Qty: 60 TABLET | Refills: 5 | Status: SHIPPED | OUTPATIENT
Start: 2019-11-20 | End: 2020-07-16 | Stop reason: SDUPTHER

## 2020-01-13 ENCOUNTER — OFFICE VISIT (OUTPATIENT)
Dept: RHEUMATOLOGY | Facility: CLINIC | Age: 55
End: 2020-01-13
Payer: COMMERCIAL

## 2020-01-13 VITALS
HEIGHT: 66 IN | HEART RATE: 78 BPM | DIASTOLIC BLOOD PRESSURE: 78 MMHG | BODY MASS INDEX: 40.18 KG/M2 | SYSTOLIC BLOOD PRESSURE: 126 MMHG | WEIGHT: 250 LBS

## 2020-01-13 DIAGNOSIS — D69.3 CHRONIC ITP (IDIOPATHIC THROMBOCYTOPENIA): ICD-10-CM

## 2020-01-13 DIAGNOSIS — M06.00 SERONEGATIVE RHEUMATOID ARTHRITIS: ICD-10-CM

## 2020-01-13 DIAGNOSIS — D69.6 THROMBOCYTOPENIA: ICD-10-CM

## 2020-01-13 DIAGNOSIS — G60.3 IDIOPATHIC PROGRESSIVE NEUROPATHY: ICD-10-CM

## 2020-01-13 DIAGNOSIS — M35.00 SJOGREN'S SYNDROME, WITH UNSPECIFIED ORGAN INVOLVEMENT: Primary | ICD-10-CM

## 2020-01-13 PROCEDURE — 96372 THER/PROPH/DIAG INJ SC/IM: CPT | Mod: S$GLB,,, | Performed by: INTERNAL MEDICINE

## 2020-01-13 PROCEDURE — 96372 PR INJECTION,THERAP/PROPH/DIAG2ST, IM OR SUBCUT: ICD-10-PCS | Mod: S$GLB,,, | Performed by: INTERNAL MEDICINE

## 2020-01-13 PROCEDURE — 99214 OFFICE O/P EST MOD 30 MIN: CPT | Mod: 25,S$GLB,, | Performed by: INTERNAL MEDICINE

## 2020-01-13 PROCEDURE — 99999 PR PBB SHADOW E&M-EST. PATIENT-LVL III: ICD-10-PCS | Mod: PBBFAC,,, | Performed by: INTERNAL MEDICINE

## 2020-01-13 PROCEDURE — 3008F PR BODY MASS INDEX (BMI) DOCUMENTED: ICD-10-PCS | Mod: CPTII,S$GLB,, | Performed by: INTERNAL MEDICINE

## 2020-01-13 PROCEDURE — 3008F BODY MASS INDEX DOCD: CPT | Mod: CPTII,S$GLB,, | Performed by: INTERNAL MEDICINE

## 2020-01-13 PROCEDURE — 99214 PR OFFICE/OUTPT VISIT, EST, LEVL IV, 30-39 MIN: ICD-10-PCS | Mod: 25,S$GLB,, | Performed by: INTERNAL MEDICINE

## 2020-01-13 PROCEDURE — 99999 PR PBB SHADOW E&M-EST. PATIENT-LVL III: CPT | Mod: PBBFAC,,, | Performed by: INTERNAL MEDICINE

## 2020-01-13 RX ORDER — METHYLPREDNISOLONE ACETATE 80 MG/ML
160 INJECTION, SUSPENSION INTRA-ARTICULAR; INTRALESIONAL; INTRAMUSCULAR; SOFT TISSUE
Status: COMPLETED | OUTPATIENT
Start: 2020-01-13 | End: 2020-01-13

## 2020-01-13 RX ORDER — KETOROLAC TROMETHAMINE 30 MG/ML
60 INJECTION, SOLUTION INTRAMUSCULAR; INTRAVENOUS
Status: COMPLETED | OUTPATIENT
Start: 2020-01-13 | End: 2020-01-13

## 2020-01-13 RX ORDER — CYANOCOBALAMIN 1000 UG/ML
1000 INJECTION, SOLUTION INTRAMUSCULAR; SUBCUTANEOUS
Status: COMPLETED | OUTPATIENT
Start: 2020-01-13 | End: 2020-01-13

## 2020-01-13 RX ADMIN — METHYLPREDNISOLONE ACETATE 160 MG: 80 INJECTION, SUSPENSION INTRA-ARTICULAR; INTRALESIONAL; INTRAMUSCULAR; SOFT TISSUE at 04:01

## 2020-01-13 RX ADMIN — KETOROLAC TROMETHAMINE 60 MG: 30 INJECTION, SOLUTION INTRAMUSCULAR; INTRAVENOUS at 04:01

## 2020-01-13 RX ADMIN — CYANOCOBALAMIN 1000 MCG: 1000 INJECTION, SOLUTION INTRAMUSCULAR; SUBCUTANEOUS at 04:01

## 2020-01-13 ASSESSMENT — ROUTINE ASSESSMENT OF PATIENT INDEX DATA (RAPID3)
PATIENT GLOBAL ASSESSMENT SCORE: 6.5
FATIGUE SCORE: 1.1
MDHAQ FUNCTION SCORE: .8
PAIN SCORE: 6
PSYCHOLOGICAL DISTRESS SCORE: 2.2
TOTAL RAPID3 SCORE: 5.05

## 2020-01-13 NOTE — PROGRESS NOTES
Subjective:       Patient ID: Mack Melton is a 54 y.o. female.    Chief Complaint: Disease Management (sjogren's syndrome)    Follow up:  sjogren's on plaquenil neck and lumbar pain. She has B foot pain on top ankle pain, fatigue stiffness low platlets, holds plaquenil when she starts bruising.Patient complains of arthralgias and myalgias for which has been present for a few years. Pain is located in multiple joints, both shoulder(s), both elbow(s), both wrist(s), both MCP(s): 1st, 2nd, 3rd, 4th and 5th, both PIP(s): 1st, 2nd, 3rd, 4th and 5th, both DIP(s): 1st and 2nd, both hip(s), both knee(s) and both MTP(s): 1st, 2nd, 3rd, 4th and 5th, is described as aching, pulsating, shooting and throbbing, and is constant, moderate .  And neuropathy of the foot B top of foot          Associated symptoms include fatigue.         Review of Systems   Constitutional: Positive for activity change, chills and fatigue. Negative for appetite change, diaphoresis and unexpected weight change.   HENT: Negative for congestion, dental problem, ear discharge, ear pain, facial swelling, mouth sores, nosebleeds, postnasal drip, rhinorrhea, sinus pressure, sneezing, sore throat, tinnitus and voice change.    Eyes: Negative for photophobia, pain, discharge, redness and itching.   Respiratory: Negative for apnea, cough, chest tightness, shortness of breath and wheezing.    Cardiovascular: Positive for leg swelling. Negative for chest pain and palpitations.   Gastrointestinal: Positive for abdominal pain. Negative for abdominal distention, constipation, diarrhea, nausea and vomiting.   Endocrine: Negative for cold intolerance, heat intolerance, polydipsia and polyuria.   Genitourinary: Negative for decreased urine volume, difficulty urinating, flank pain, frequency, hematuria and urgency.   Musculoskeletal: Positive for arthralgias, back pain, gait problem, neck pain and neck stiffness.   Skin: Negative for pallor, rash and wound.  "  Allergic/Immunologic: Negative for immunocompromised state.   Neurological: Positive for weakness. Negative for dizziness, tremors and numbness.   Hematological: Negative for adenopathy. Does not bruise/bleed easily.   Psychiatric/Behavioral: Negative for sleep disturbance. The patient is not nervous/anxious.          Objective:     /78 (BP Location: Left arm, Patient Position: Sitting, BP Method: Medium (Automatic))   Pulse 78   Ht 5' 6" (1.676 m)   Wt 113.4 kg (250 lb)   BMI 40.35 kg/m²      Physical Exam   Vitals reviewed.  Constitutional: She is oriented to person, place, and time. No distress.   HENT:   Head: Normocephalic and atraumatic.   Eyes: EOM are normal. Pupils are equal, round, and reactive to light. Right eye exhibits no discharge. Left eye exhibits no discharge.   Neck: Neck supple. No thyromegaly present.   Cardiovascular: Normal rate, regular rhythm and normal heart sounds.  Exam reveals no gallop and no friction rub.    No murmur heard.  Pulmonary/Chest: Breath sounds normal. She has no wheezes. She has no rales. She exhibits no tenderness.   Abdominal: There is no tenderness. There is no rebound and no guarding.       Right Side Rheumatological Exam     Examination finds the elbow normal.    The patient is tender to palpation of the shoulder, wrist, knee, 1st PIP, 1st MCP, 2nd PIP, 2nd MCP, 3rd PIP, 3rd MCP, 4th PIP, 4th MCP, 5th PIP and 5th MCP    She has swelling of the 1st PIP, 1st MCP, 2nd PIP, 2nd MCP, 3rd PIP, 3rd MCP, 4th PIP, 4th MCP, 5th PIP and 5th MCP    Shoulder Exam   Tenderness Location: no tenderness    Range of Motion   Active abduction: abnormal   Adduction: abnormal  Sensation: normal    Knee Exam   Patellofemoral Crepitus: positive  Effusion: positive  Sensation: normal    Hip Exam   Tenderness Location: posterior  Sensation: normal    Elbow/Wrist Exam   Tenderness Location: no tenderness  Sensation: normal    Left Side Rheumatological Exam     The patient is tender " to palpation of the shoulder, elbow, wrist, knee, 1st PIP, 1st MCP, 2nd PIP, 2nd MCP, 3rd PIP, 3rd MCP, 4th PIP, 4th MCP, 5th PIP and 5th MCP.    She has swelling of the 1st PIP, 1st MCP, 2nd PIP, 2nd MCP, 3rd PIP, 3rd MCP, 4th PIP, 4th MCP, 5th PIP and 5th MCP    Shoulder Exam   Tenderness Location: no tenderness    Range of Motion   Active abduction: abnormal   Sensation: normal    Knee Exam     Patellofemoral Crepitus: positive  Effusion: positive  Sensation: normal    Hip Exam   Tenderness Location: posterior  Sensation: normal    Elbow/Wrist Exam   Sensation: normal      Back/Neck Exam   General Inspection   Gait: normal         Lymphadenopathy:     She has no cervical adenopathy.   Neurological: She is alert and oriented to person, place, and time. Gait normal.   Skin: Skin is dry. No rash noted. No erythema. There is pallor.     Psychiatric: Mood and affect normal.   Musculoskeletal: She exhibits tenderness and deformity.         Results for orders placed or performed in visit on 01/13/20   SIENA Screen w/Reflex   Result Value Ref Range    SIENA POSITIVE (A) NEGATIVE   Phosphorus   Result Value Ref Range    Phosphate (as Phosphorus) 3.1 2.5 - 4.5 mg/dL   Magnesium   Result Value Ref Range    Magnesium 1.7 1.5 - 2.5 mg/dL   C-reactive protein   Result Value Ref Range    CRP 0.7 <8.0 mg/L   CBC auto differential   Result Value Ref Range    WBC 6.2 3.8 - 10.8 Thousand/uL    RBC 4.29 3.80 - 5.10 Million/uL    Hemoglobin 12.0 11.7 - 15.5 g/dL    Hematocrit 36.8 35.0 - 45.0 %    Mean Corpuscular Volume 85.8 80.0 - 100.0 fL    Mean Corpuscular Hemoglobin 28.0 27.0 - 33.0 pg    Mean Corpuscular Hemoglobin Conc 32.6 32.0 - 36.0 g/dL    RDW 12.7 11.0 - 15.0 %    Platelets 108 (L) 140 - 400 Thousand/uL    MPV 13.8 (H) 7.5 - 12.5 fL    Neutrophils Absolute 4,185 1,500 - 7,800 cells/uL    Lymph # 1,240 850 - 3,900 cells/uL    Mono # 645 200 - 950 cells/uL    Eos # 99 15 - 500 cells/uL    Baso # 31 0 - 200 cells/uL     Neutrophils Relative 67.5 %    Lymph% 20.0 %    Mono% 10.4 %    Eosinophil% 1.6 %    Basophil% 0.5 %   Comprehensive metabolic panel   Result Value Ref Range    Glucose 89 65 - 139 mg/dL    BUN, Bld 16 7 - 25 mg/dL    Creatinine 0.85 0.50 - 1.05 mg/dL    eGFR if non  77 > OR = 60 mL/min/1.73m2    eGFR if African American 89 > OR = 60 mL/min/1.73m2    BUN/Creatinine Ratio NOT APPLICABLE 6 - 22 (calc)    Sodium 139 135 - 146 mmol/L    Potassium 3.8 3.5 - 5.3 mmol/L    Chloride 105 98 - 110 mmol/L    CO2 25 20 - 32 mmol/L    Calcium 9.2 8.6 - 10.4 mg/dL    Total Protein 7.4 6.1 - 8.1 g/dL    Albumin 4.0 3.6 - 5.1 g/dL    Globulin, Total 3.4 1.9 - 3.7 g/dL (calc)    Albumin/Globulin Ratio 1.2 1.0 - 2.5 (calc)    Total Bilirubin 0.4 0.2 - 1.2 mg/dL    Alkaline Phosphatase 40 33 - 130 U/L    AST 15 10 - 35 U/L    ALT 11 6 - 29 U/L   Aldolase   Result Value Ref Range    Aldolase 3.6 < OR = 8.1 U/L   CK   Result Value Ref Range    Creatine Kinase, Total 259 (H) 29 - 143 U/L   Sedimentation rate, automated   Result Value Ref Range    Sed Rate 14 < OR = 30 mm/h   Antinuclear Antibodies Titer and Pattern   Result Value Ref Range    SIENA Titer 1:640 (H) titer    SIENA Pattern Nuclear, Speckled (A)      TECHNIQUE:  Complete abdominal ultrasound (including pancreas, aorta, liver, gallbladder, common bile duct, IVC, kidneys, and spleen) was performed.    COMPARISON:  None    FINDINGS:  Pancreas is normal in appearance.    Aorta is normal in appearance and IVC visualized.    Gallbladder is normal in appearance without evidence of cholelithiasis or cholecystitis.    No intra or extrahepatic biliary dilatation the common bile duct measures 2.9 mm.    Liver is minimally prominent size measuring 17.4 cm in craniocaudal length.  Diffuse increased echotexture throughout the liver suggesting fatty infiltration.  No cystic, solid or complex nodule identified.    Kidneys are normal in symmetric in size measuring 11.1 cm in  length.  No hydronephrosis or nephrolithiasis.  Normal echotexture within well maintained cortex bilaterally.    Spleen is normal in echotexture and size measuring 9.2 cm in maximum length.      Impression       Borderline hepatomegaly with probable fatty infiltration.    No other significant findings.      Electronically signed by: Gregory Walker MD  Date: 08/22/2018  Time: 19:46         Last Resulted: 08/22/18 19:46        reviewed labs with patient during this visit   ,       Assessment:       1. Sjogren's syndrome, with unspecified organ involvement    2. Thrombocytopenia    3. Chronic ITP (idiopathic thrombocytopenia)    4. Idiopathic progressive neuropathy    5. Seronegative rheumatoid arthritis            Plan:     Mack was seen today for disease management.    Diagnoses and all orders for this visit:    Sjogren's syndrome, with unspecified organ involvement  -     Cancel: Aldolase; Future  -     Cancel: CK; Future  -     Cancel: Comprehensive metabolic panel; Future  -     Cancel: CBC auto differential; Future  -     Cancel: Sedimentation rate; Future  -     Cancel: C-reactive protein; Future  -     Cancel: Magnesium; Future  -     Cancel: Phosphorus; Future  -     ketorolac injection 60 mg  -     methylPREDNISolone acetate injection 160 mg  -     cyanocobalamin injection 1,000 mcg  -     Cancel: SIENA Screen w/Reflex; Future  -     SIENA Screen w/Reflex; Future  -     Phosphorus; Future  -     Magnesium; Future  -     C-reactive protein; Future  -     Sedimentation rate; Future  -     CBC auto differential; Future  -     Comprehensive metabolic panel; Future  -     Aldolase; Future  -     CK; Future  -     SIENA Screen w/Reflex  -     Phosphorus  -     Magnesium  -     C-reactive protein  -     Sedimentation rate  -     CBC auto differential  -     Comprehensive metabolic panel  -     Aldolase  -     CK    Thrombocytopenia  -     Cancel: Aldolase; Future  -     Cancel: CK; Future  -     Cancel: Comprehensive  metabolic panel; Future  -     Cancel: CBC auto differential; Future  -     Cancel: Sedimentation rate; Future  -     Cancel: C-reactive protein; Future  -     Cancel: Magnesium; Future  -     Cancel: Phosphorus; Future  -     ketorolac injection 60 mg  -     methylPREDNISolone acetate injection 160 mg  -     cyanocobalamin injection 1,000 mcg  -     Cancel: SIENA Screen w/Reflex; Future  -     SIENA Screen w/Reflex; Future  -     Phosphorus; Future  -     Magnesium; Future  -     C-reactive protein; Future  -     Sedimentation rate; Future  -     CBC auto differential; Future  -     Comprehensive metabolic panel; Future  -     Aldolase; Future  -     CK; Future  -     SIENA Screen w/Reflex  -     Phosphorus  -     Magnesium  -     C-reactive protein  -     Sedimentation rate  -     CBC auto differential  -     Comprehensive metabolic panel  -     Aldolase  -     CK    Chronic ITP (idiopathic thrombocytopenia)  -     Cancel: Aldolase; Future  -     Cancel: CK; Future  -     Cancel: Comprehensive metabolic panel; Future  -     Cancel: CBC auto differential; Future  -     Cancel: Sedimentation rate; Future  -     Cancel: C-reactive protein; Future  -     Cancel: Magnesium; Future  -     Cancel: Phosphorus; Future  -     ketorolac injection 60 mg  -     methylPREDNISolone acetate injection 160 mg  -     cyanocobalamin injection 1,000 mcg  -     Cancel: SIENA Screen w/Reflex; Future  -     SIENA Screen w/Reflex; Future  -     Phosphorus; Future  -     Magnesium; Future  -     C-reactive protein; Future  -     Sedimentation rate; Future  -     CBC auto differential; Future  -     Comprehensive metabolic panel; Future  -     Aldolase; Future  -     CK; Future  -     SIENA Screen w/Reflex  -     Phosphorus  -     Magnesium  -     C-reactive protein  -     Sedimentation rate  -     CBC auto differential  -     Comprehensive metabolic panel  -     Aldolase  -     CK    Idiopathic progressive neuropathy  -     Cancel: Aldolase; Future  -      Cancel: CK; Future  -     Cancel: Comprehensive metabolic panel; Future  -     Cancel: CBC auto differential; Future  -     Cancel: Sedimentation rate; Future  -     Cancel: C-reactive protein; Future  -     Cancel: Magnesium; Future  -     Cancel: Phosphorus; Future  -     ketorolac injection 60 mg  -     methylPREDNISolone acetate injection 160 mg  -     cyanocobalamin injection 1,000 mcg  -     Cancel: SIENA Screen w/Reflex; Future  -     SIENA Screen w/Reflex; Future  -     Phosphorus; Future  -     Magnesium; Future  -     C-reactive protein; Future  -     Sedimentation rate; Future  -     CBC auto differential; Future  -     Comprehensive metabolic panel; Future  -     Aldolase; Future  -     CK; Future  -     SIENA Screen w/Reflex  -     Phosphorus  -     Magnesium  -     C-reactive protein  -     Sedimentation rate  -     CBC auto differential  -     Comprehensive metabolic panel  -     Aldolase  -     CK    Seronegative rheumatoid arthritis  -     ketorolac injection 60 mg  -     methylPREDNISolone acetate injection 160 mg  -     cyanocobalamin injection 1,000 mcg  -     Cancel: SIENA Screen w/Reflex; Future  -     SIENA Screen w/Reflex; Future  -     SIENA Screen w/Reflex    Other orders  -     Sedimentation rate, automated  -     Antinuclear Antibodies Titer and Pattern      If arthritis is bad and or plts drop consider rituxan

## 2020-01-13 NOTE — PROGRESS NOTES
Administered 1 cc ( 1000 mcg/ml ) of b12 to the right upper outer gluteal. Informed of s/s to report verbalized understanding. No adverse reactions noted.    Lot # [id number]  Expiration jan 21    Administered 2 cc ( 80 mg/ml ) of depomedrol to the right upper outer gluteal. Informed of s/s to report verbalized understanding. No adverse reactions noted.    Lot # [id number]  Expiration 05/2021    Administered 2 cc ( 30 mg/ml ) of toradol to the left upper outer gluteal. Informed of s/s to report verbalized understanding. No adverse reactions noted.    Lot # [id number]  Expiration 1 aug 2020

## 2020-01-21 LAB
ALBUMIN SERPL-MCNC: 4 G/DL (ref 3.6–5.1)
ALBUMIN/GLOB SERPL: 1.2 (CALC) (ref 1–2.5)
ALDOLASE SERPL-CCNC: 3.6 U/L
ALP SERPL-CCNC: 40 U/L (ref 33–130)
ALT SERPL-CCNC: 11 U/L (ref 6–29)
ANA PAT SER IF-IMP: ABNORMAL
ANA SER QL IF: POSITIVE
ANA TITR SER IF: ABNORMAL TITER
AST SERPL-CCNC: 15 U/L (ref 10–35)
BASOPHILS # BLD AUTO: 31 CELLS/UL (ref 0–200)
BASOPHILS NFR BLD AUTO: 0.5 %
BILIRUB SERPL-MCNC: 0.4 MG/DL (ref 0.2–1.2)
BUN SERPL-MCNC: 16 MG/DL (ref 7–25)
BUN/CREAT SERPL: NORMAL (CALC) (ref 6–22)
CALCIUM SERPL-MCNC: 9.2 MG/DL (ref 8.6–10.4)
CHLORIDE SERPL-SCNC: 105 MMOL/L (ref 98–110)
CK SERPL-CCNC: 259 U/L (ref 29–143)
CO2 SERPL-SCNC: 25 MMOL/L (ref 20–32)
CREAT SERPL-MCNC: 0.85 MG/DL (ref 0.5–1.05)
CRP SERPL-MCNC: 0.7 MG/L
EOSINOPHIL # BLD AUTO: 99 CELLS/UL (ref 15–500)
EOSINOPHIL NFR BLD AUTO: 1.6 %
ERYTHROCYTE [DISTWIDTH] IN BLOOD BY AUTOMATED COUNT: 12.7 % (ref 11–15)
ERYTHROCYTE [SEDIMENTATION RATE] IN BLOOD BY WESTERGREN METHOD: 14 MM/H
GFRSERPLBLD MDRD-ARVRAT: 77 ML/MIN/1.73M2
GLOBULIN SER CALC-MCNC: 3.4 G/DL (CALC) (ref 1.9–3.7)
GLUCOSE SERPL-MCNC: 89 MG/DL (ref 65–139)
HCT VFR BLD AUTO: 36.8 % (ref 35–45)
HGB BLD-MCNC: 12 G/DL (ref 11.7–15.5)
LYMPHOCYTES # BLD AUTO: 1240 CELLS/UL (ref 850–3900)
LYMPHOCYTES NFR BLD AUTO: 20 %
MAGNESIUM SERPL-MCNC: 1.7 MG/DL (ref 1.5–2.5)
MCH RBC QN AUTO: 28 PG (ref 27–33)
MCHC RBC AUTO-ENTMCNC: 32.6 G/DL (ref 32–36)
MCV RBC AUTO: 85.8 FL (ref 80–100)
MONOCYTES # BLD AUTO: 645 CELLS/UL (ref 200–950)
MONOCYTES NFR BLD AUTO: 10.4 %
NEUTROPHILS # BLD AUTO: 4185 CELLS/UL (ref 1500–7800)
NEUTROPHILS NFR BLD AUTO: 67.5 %
PHOSPHATE SERPL-MCNC: 3.1 MG/DL (ref 2.5–4.5)
PLATELET # BLD AUTO: 108 THOUSAND/UL (ref 140–400)
PMV BLD REES-ECKER: 13.8 FL (ref 7.5–12.5)
POTASSIUM SERPL-SCNC: 3.8 MMOL/L (ref 3.5–5.3)
PROT SERPL-MCNC: 7.4 G/DL (ref 6.1–8.1)
RBC # BLD AUTO: 4.29 MILLION/UL (ref 3.8–5.1)
SODIUM SERPL-SCNC: 139 MMOL/L (ref 135–146)
WBC # BLD AUTO: 6.2 THOUSAND/UL (ref 3.8–10.8)

## 2020-01-29 ENCOUNTER — TELEPHONE (OUTPATIENT)
Dept: RHEUMATOLOGY | Facility: CLINIC | Age: 55
End: 2020-01-29

## 2020-01-29 NOTE — TELEPHONE ENCOUNTER
----- Message from Rodo Harding MD sent at 1/23/2020 10:43 AM CST -----  Test indicate an arthritic flare we will address this in our next visit please contact us if your are in severe pain

## 2020-02-24 DIAGNOSIS — E03.9 HYPOTHYROIDISM, UNSPECIFIED TYPE: ICD-10-CM

## 2020-02-24 DIAGNOSIS — D69.6 THROMBOCYTOPENIA: ICD-10-CM

## 2020-02-24 DIAGNOSIS — M35.00 SJOGREN'S SYNDROME, WITH UNSPECIFIED ORGAN INVOLVEMENT: ICD-10-CM

## 2020-02-24 DIAGNOSIS — D69.3 CHRONIC ITP (IDIOPATHIC THROMBOCYTOPENIA): ICD-10-CM

## 2020-02-24 DIAGNOSIS — C73 FOLLICULAR CARCINOMA OF THYROID: ICD-10-CM

## 2020-02-24 DIAGNOSIS — G60.3 IDIOPATHIC PROGRESSIVE NEUROPATHY: ICD-10-CM

## 2020-02-24 NOTE — TELEPHONE ENCOUNTER
----- Message from Merlene Chavez sent at 2/24/2020  3:58 PM CST -----  Contact: Patient  Type:  RX Refill Request    Who Called:  Patient  Refill or New Rx:  New RX  RX Name and Strength:  modafinil (PROVIGIL) 200 MG Tab  How is the patient currently taking it? (ex. 1XDay):  Take 1 tablet (200 mg total) by mouth once daily. - Oral  Is this a 30 day or 90 day RX:  30 tablet  Preferred Pharmacy with phone number:    [facility] [place] [id number] [address] [address]  Phone: [phone] Fax: [phone]  Local or Mail Order:  local  Ordering Provider:  Rodo Harding MD  Best Call Back Number:  [phone]  Additional Information:  Patient is out of this medication and is requesting that it be filled before the holiday if possible.

## 2020-02-27 RX ORDER — MODAFINIL 200 MG/1
TABLET ORAL
Qty: 30 TABLET | Refills: 4 | Status: SHIPPED | OUTPATIENT
Start: 2020-02-27 | End: 2020-07-16 | Stop reason: SDUPTHER

## 2020-04-04 DIAGNOSIS — M35.00 SJOGREN'S SYNDROME, WITH UNSPECIFIED ORGAN INVOLVEMENT: ICD-10-CM

## 2020-04-04 DIAGNOSIS — C73 FOLLICULAR CARCINOMA OF THYROID: ICD-10-CM

## 2020-04-04 DIAGNOSIS — G60.3 IDIOPATHIC PROGRESSIVE NEUROPATHY: ICD-10-CM

## 2020-04-04 DIAGNOSIS — D69.6 THROMBOCYTOPENIA: ICD-10-CM

## 2020-04-04 DIAGNOSIS — D69.3 CHRONIC ITP (IDIOPATHIC THROMBOCYTOPENIA): ICD-10-CM

## 2020-04-04 DIAGNOSIS — E03.9 HYPOTHYROIDISM, UNSPECIFIED TYPE: ICD-10-CM

## 2020-04-05 RX ORDER — FOLIC ACID 1 MG/1
TABLET ORAL
Qty: 30 TABLET | Refills: 5 | Status: SHIPPED | OUTPATIENT
Start: 2020-04-05

## 2020-05-14 ENCOUNTER — TELEPHONE (OUTPATIENT)
Dept: RHEUMATOLOGY | Facility: CLINIC | Age: 55
End: 2020-05-14

## 2020-05-14 NOTE — TELEPHONE ENCOUNTER
Attempted to contact patient on both numbers in chart regarding up coming appointment. Left a message to contact office.

## 2020-05-18 ENCOUNTER — TELEPHONE (OUTPATIENT)
Dept: RHEUMATOLOGY | Facility: CLINIC | Age: 55
End: 2020-05-18

## 2020-05-18 NOTE — TELEPHONE ENCOUNTER
----- Message from Eunice Herrera sent at 5/18/2020  1:10 PM CDT -----  Contact: PT Mack Melton MRN [id number]  @# [phone]  PT Mack Melton MRN [id number]  @# [phone]    Patient needs call back ASAP  Had appt for 2 pm virtual - needed to be in person as needed injections.  Please contact ASAP PT Mack Melton MRN [id number]  @# [phone]    Also for Dr. Kiana Dill for PTs mother Neha Weston MRN [id number] I could not find access to her doctor for message

## 2020-05-18 NOTE — TELEPHONE ENCOUNTER
----- Message from Arlen Urbano sent at 5/18/2020 10:32 AM CDT -----  Contact: Patient  Type:  Patient Returning Call  Who Called:  patient  Who Left Message for Patient:  arlen  Does the patient know what this is regarding?:  yes  Best Call Back Number:  [phone]  Additional Information:  Patient was not aware apt. Is virtual and also patient states how is she going to get her injection over the phone?.  Please call to advise.  Thanks!

## 2020-05-23 DIAGNOSIS — M47.817 LUMBAR AND SACRAL ARTHRITIS: ICD-10-CM

## 2020-05-23 DIAGNOSIS — M35.00 SJOGREN'S SYNDROME, WITH UNSPECIFIED ORGAN INVOLVEMENT: ICD-10-CM

## 2020-05-23 DIAGNOSIS — M35.09 SJOGREN'S SYNDROME WITH OTHER ORGAN INVOLVEMENT: ICD-10-CM

## 2020-05-23 DIAGNOSIS — M02.30 REITER'S DISEASE, REITER'S DISEASE OF UNSPECIFIED SITE: ICD-10-CM

## 2020-05-23 DIAGNOSIS — D69.6 THROMBOCYTOPENIA: ICD-10-CM

## 2020-05-23 DIAGNOSIS — G60.3 IDIOPATHIC PROGRESSIVE NEUROPATHY: ICD-10-CM

## 2020-05-26 RX ORDER — CYANOCOBALAMIN 1000 UG/ML
INJECTION, SOLUTION INTRAMUSCULAR; SUBCUTANEOUS
Qty: 30 ML | Refills: 4 | Status: SHIPPED | OUTPATIENT
Start: 2020-05-26 | End: 2021-06-07

## 2020-07-15 ENCOUNTER — TELEPHONE (OUTPATIENT)
Dept: RHEUMATOLOGY | Facility: CLINIC | Age: 55
End: 2020-07-15

## 2020-07-16 ENCOUNTER — OFFICE VISIT (OUTPATIENT)
Dept: RHEUMATOLOGY | Facility: CLINIC | Age: 55
End: 2020-07-16
Payer: COMMERCIAL

## 2020-07-16 VITALS
HEART RATE: 77 BPM | DIASTOLIC BLOOD PRESSURE: 79 MMHG | BODY MASS INDEX: 40.02 KG/M2 | WEIGHT: 249 LBS | SYSTOLIC BLOOD PRESSURE: 159 MMHG | HEIGHT: 66 IN

## 2020-07-16 DIAGNOSIS — M06.00 SERONEGATIVE RHEUMATOID ARTHRITIS: ICD-10-CM

## 2020-07-16 DIAGNOSIS — G60.3 IDIOPATHIC PROGRESSIVE NEUROPATHY: ICD-10-CM

## 2020-07-16 DIAGNOSIS — E03.9 HYPOTHYROIDISM, UNSPECIFIED TYPE: ICD-10-CM

## 2020-07-16 DIAGNOSIS — R74.8 ELEVATED CK: ICD-10-CM

## 2020-07-16 DIAGNOSIS — D69.3 CHRONIC ITP (IDIOPATHIC THROMBOCYTOPENIA): ICD-10-CM

## 2020-07-16 DIAGNOSIS — F41.9 ANXIETY: ICD-10-CM

## 2020-07-16 DIAGNOSIS — Z79.899 HIGH RISK MEDICATION USE: ICD-10-CM

## 2020-07-16 DIAGNOSIS — D69.6 THROMBOCYTOPENIA: ICD-10-CM

## 2020-07-16 DIAGNOSIS — M35.00 SJOGREN'S SYNDROME, WITH UNSPECIFIED ORGAN INVOLVEMENT: Primary | ICD-10-CM

## 2020-07-16 DIAGNOSIS — C73 FOLLICULAR CARCINOMA OF THYROID: ICD-10-CM

## 2020-07-16 PROCEDURE — 99214 PR OFFICE/OUTPT VISIT, EST, LEVL IV, 30-39 MIN: ICD-10-PCS | Mod: 25,S$GLB,, | Performed by: PHYSICIAN ASSISTANT

## 2020-07-16 PROCEDURE — 3008F BODY MASS INDEX DOCD: CPT | Mod: CPTII,S$GLB,, | Performed by: PHYSICIAN ASSISTANT

## 2020-07-16 PROCEDURE — 99214 OFFICE O/P EST MOD 30 MIN: CPT | Mod: 25,S$GLB,, | Performed by: PHYSICIAN ASSISTANT

## 2020-07-16 PROCEDURE — 99999 PR PBB SHADOW E&M-EST. PATIENT-LVL III: ICD-10-PCS | Mod: PBBFAC,,, | Performed by: PHYSICIAN ASSISTANT

## 2020-07-16 PROCEDURE — 96372 PR INJECTION,THERAP/PROPH/DIAG2ST, IM OR SUBCUT: ICD-10-PCS | Mod: S$GLB,,, | Performed by: PHYSICIAN ASSISTANT

## 2020-07-16 PROCEDURE — 99999 PR PBB SHADOW E&M-EST. PATIENT-LVL III: CPT | Mod: PBBFAC,,, | Performed by: PHYSICIAN ASSISTANT

## 2020-07-16 PROCEDURE — 96372 THER/PROPH/DIAG INJ SC/IM: CPT | Mod: S$GLB,,, | Performed by: PHYSICIAN ASSISTANT

## 2020-07-16 PROCEDURE — 3008F PR BODY MASS INDEX (BMI) DOCUMENTED: ICD-10-PCS | Mod: CPTII,S$GLB,, | Performed by: PHYSICIAN ASSISTANT

## 2020-07-16 RX ORDER — METHYLPREDNISOLONE 4 MG/1
8 TABLET ORAL DAILY
Qty: 60 TABLET | Refills: 5 | Status: SHIPPED | OUTPATIENT
Start: 2020-07-16 | End: 2021-10-13 | Stop reason: SDUPTHER

## 2020-07-16 RX ORDER — KETOROLAC TROMETHAMINE 30 MG/ML
60 INJECTION, SOLUTION INTRAMUSCULAR; INTRAVENOUS
Status: COMPLETED | OUTPATIENT
Start: 2020-07-16 | End: 2020-07-16

## 2020-07-16 RX ORDER — TIZANIDINE 4 MG/1
4 TABLET ORAL EVERY 8 HOURS PRN
Qty: 90 TABLET | Refills: 5 | Status: SHIPPED | OUTPATIENT
Start: 2020-07-16 | End: 2021-02-08 | Stop reason: SDUPTHER

## 2020-07-16 RX ORDER — HYDROXYCHLOROQUINE SULFATE 200 MG/1
200 TABLET, FILM COATED ORAL 2 TIMES DAILY
Qty: 60 TABLET | Refills: 5 | Status: SHIPPED | OUTPATIENT
Start: 2020-07-16 | End: 2021-02-08 | Stop reason: SDUPTHER

## 2020-07-16 RX ORDER — CYANOCOBALAMIN 1000 UG/ML
1000 INJECTION, SOLUTION INTRAMUSCULAR; SUBCUTANEOUS
Status: COMPLETED | OUTPATIENT
Start: 2020-07-16 | End: 2020-07-16

## 2020-07-16 RX ORDER — CEVIMELINE HYDROCHLORIDE 30 MG/1
30 CAPSULE ORAL 3 TIMES DAILY
Qty: 90 CAPSULE | Refills: 3 | Status: SHIPPED | OUTPATIENT
Start: 2020-07-16 | End: 2021-02-08 | Stop reason: SDUPTHER

## 2020-07-16 RX ORDER — METHYLPREDNISOLONE ACETATE 80 MG/ML
160 INJECTION, SUSPENSION INTRA-ARTICULAR; INTRALESIONAL; INTRAMUSCULAR; SOFT TISSUE
Status: COMPLETED | OUTPATIENT
Start: 2020-07-16 | End: 2020-07-16

## 2020-07-16 RX ORDER — NABUMETONE 750 MG/1
750 TABLET, FILM COATED ORAL 2 TIMES DAILY PRN
Qty: 60 TABLET | Refills: 5 | Status: SHIPPED | OUTPATIENT
Start: 2020-07-16 | End: 2020-10-05

## 2020-07-16 RX ORDER — ESCITALOPRAM OXALATE 10 MG/1
10 TABLET ORAL DAILY
Qty: 30 TABLET | Refills: 3 | Status: SHIPPED | OUTPATIENT
Start: 2020-07-16 | End: 2021-02-08

## 2020-07-16 RX ADMIN — CYANOCOBALAMIN 1000 MCG: 1000 INJECTION, SOLUTION INTRAMUSCULAR; SUBCUTANEOUS at 02:07

## 2020-07-16 RX ADMIN — KETOROLAC TROMETHAMINE 60 MG: 30 INJECTION, SOLUTION INTRAMUSCULAR; INTRAVENOUS at 02:07

## 2020-07-16 RX ADMIN — METHYLPREDNISOLONE ACETATE 160 MG: 80 INJECTION, SUSPENSION INTRA-ARTICULAR; INTRALESIONAL; INTRAMUSCULAR; SOFT TISSUE at 02:07

## 2020-07-16 ASSESSMENT — ROUTINE ASSESSMENT OF PATIENT INDEX DATA (RAPID3)
PATIENT GLOBAL ASSESSMENT SCORE: 5
FATIGUE SCORE: 1.1
MDHAQ FUNCTION SCORE: 0.6
PAIN SCORE: 5
TOTAL RAPID3 SCORE: 4
PSYCHOLOGICAL DISTRESS SCORE: 3.3

## 2020-07-16 NOTE — PROGRESS NOTES
Administered 1cc B12 1000mcg to right upper outer gluteal.  Pt tolerated well.No acute reaction noted to site. Pt instructed on S/S to report. Advised pt to waitn in lobby 15 minutes after receiving injection to monitor for any reactions. Pt verbalized understanding.       Administered 2 cc DepoMedrol 80mg/cc  to right upper outer gluteal. Pt tolerated well. No acute reaction noted to site. Pt instructed on S/S to report. Advised patient to wait in lobby 15 minutes after receiving injection to monitor for any reactions..  Pt verbalized understanding.     Administered 2 cc  Toradol 30mg/cc  to left upper outer gluteal. Pt tolerated well. No acute reaction noted to site. Pt instructed on S/S to report. Advised patient to wait in lobby 15 minutes after receiving injection to monitor for any reactions. Pt verbalized understanding.

## 2020-07-16 NOTE — PROGRESS NOTES
Subjective:       Patient ID: Mack Melton is a 55 y.o. female.    Chief Complaint: Disease Management    Mrs. Melton is a 55 year old female who presents to clinic for follow up on Sjogren's syndrome. She is a new patient to me. She has been doing fair since her last visit. She complain of joint pain involving her hands and ankles with morning stiffness lasting between 30min and 1 hour. She feels plaquenil, relafen, and PRN medrol are helping with her symptoms. Other sx include dry eyes, dry mouth (broken teeth), and significant fatigue. She also has chronic neck and low back pain. 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 Anxiety has been worse and feels easily agitated x 6 mos. No depression.     Current tx:  1. Plaquenil  2. Medrol  3. Relafen  4. Zanaflex  5. Provigil    Review of Systems   Constitutional: Positive for activity change and fatigue. Negative for appetite change, chills and fever.   HENT:        Dry mouth   Eyes: Negative for visual disturbance.        Dry eyes   Respiratory: Negative for cough and shortness of breath.    Cardiovascular: Negative for chest pain, palpitations and leg swelling.   Gastrointestinal: Negative for abdominal pain, constipation, diarrhea, nausea and vomiting.   Musculoskeletal: Positive for arthralgias, back pain, joint swelling, neck pain and neck stiffness.   Neurological: Negative for dizziness, weakness, light-headedness and headaches.         Objective:     Vitals:    07/16/20 1353   BP: (!) 159/79   Pulse: 77       Past Medical History:   Diagnosis Date    Acid reflux     Sjogren's syndrome     Thyroid cancer      Past Surgical History:   Procedure Laterality Date    THYROID SURGERY      UTERINE FIBROID SURGERY            Physical Exam   Constitutional: She is well-developed, well-nourished, and in no distress.   Eyes: Right conjunctiva is not injected. Left conjunctiva is not injected. Right eye exhibits  normal extraocular motion. Left eye exhibits normal extraocular motion.   Neck: No JVD present. No thyromegaly present.   Cardiovascular: Normal rate and regular rhythm.  Exam reveals no decreased pulses.    Pulmonary/Chest: She has no wheezes. She has no rhonchi. She has no rales.       Right Side Rheumatological Exam     Examination finds the shoulder, elbow, wrist, knee, 1st PIP, 2nd PIP, 3rd PIP, 4th PIP and 5th PIP normal.    The patient is tender to palpation of the 1st MCP, 2nd MCP, 3rd MCP, 4th MCP and 5th MCP    Left Side Rheumatological Exam     Examination finds the shoulder, elbow, wrist, knee, 1st PIP, 2nd PIP, 3rd PIP, 4th PIP and 5th PIP normal.    The patient is tender to palpation of the 1st MCP, 2nd MCP, 3rd MCP, 4th MCP and 5th MCP.      Lymphadenopathy:     She has no cervical adenopathy.   Neurological: Gait normal.   Skin: No rash noted.     Psychiatric: Affect normal. Her mood appears anxious.       Prior labs reviewed:  Component      Latest Ref Rng & Units 1/17/2020   WBC      3.8 - 10.8 Thousand/uL 6.2   RBC      3.80 - 5.10 Million/uL 4.29   Hemoglobin      11.7 - 15.5 g/dL 12.0   Hematocrit      35.0 - 45.0 % 36.8   MCV      80.0 - 100.0 fL 85.8   MCH      27.0 - 33.0 pg 28.0   MCHC      32.0 - 36.0 g/dL 32.6   RDW      11.0 - 15.0 % 12.7   Platelets      140 - 400 Thousand/uL 108 (L)   MPV      7.5 - 12.5 fL 13.8 (H)   Neutrophils Absolute      1,500 - 7,800 cells/uL 4,185   Lymph #      850 - 3,900 cells/uL 1,240   Mono #      200 - 950 cells/uL 645   Eos #      15 - 500 cells/uL 99   Baso #      0 - 200 cells/uL 31   Neutrophils Relative      % 67.5   Lymph%      % 20.0   Mono%      % 10.4   Eosinophil%      % 1.6   Basophil%      % 0.5   Glucose      65 - 139 mg/dL 89   BUN, Bld      7 - 25 mg/dL 16   Creatinine      0.50 - 1.05 mg/dL 0.85   eGFR if non       > OR = 60 mL/min/1.73m2 77   eGFR if       > OR = 60 mL/min/1.73m2 89   BUN/Creatinine Ratio       6 - 22 (calc) NOT APPLICABLE   Sodium      135 - 146 mmol/L 139   Potassium      3.5 - 5.3 mmol/L 3.8   Chloride      98 - 110 mmol/L 105   CO2      20 - 32 mmol/L 25   Calcium      8.6 - 10.4 mg/dL 9.2   PROTEIN TOTAL      6.1 - 8.1 g/dL 7.4   Albumin      3.6 - 5.1 g/dL 4.0   Globulin, Total      1.9 - 3.7 g/dL (calc) 3.4   Albumin/Globulin Ratio      1.0 - 2.5 (calc) 1.2   BILIRUBIN TOTAL      0.2 - 1.2 mg/dL 0.4   Alkaline Phosphatase      33 - 130 U/L 40   AST      10 - 35 U/L 15   ALT      6 - 29 U/L 11   SIENA Titer      titer 1:640 (H)   SIENA Pattern       Nuclear, Speckled (A)   SIENA      NEGATIVE POSITIVE (A)   Phosphate (as Phosphorus)      2.5 - 4.5 mg/dL 3.1   Magnesium      1.5 - 2.5 mg/dL 1.7   CRP      <8.0 mg/L 0.7   Aldolase      < OR = 8.1 U/L 3.6   Creatine Kinase, Total      29 - 143 U/L 259 (H)   Sed Rate      < OR = 30 mm/h 14     Assessment:       1. Sjogren's syndrome, with unspecified organ involvement    2. Anxiety    3. Chronic ITP (idiopathic thrombocytopenia)    4. Seronegative rheumatoid arthritis    5. High risk medication use    6. Elevated CK            Plan:       Sjogren's syndrome, with unspecified organ involvement  -     cevimeline (EVOXAC) 30 mg capsule; Take 1 capsule (30 mg total) by mouth 3 (three) times daily.  Dispense: 90 capsule; Refill: 3  -     ketorolac injection 60 mg  -     methylPREDNISolone acetate injection 160 mg  -     cyanocobalamin injection 1,000 mcg  -     CBC auto differential; Future; Expected date: 07/16/2020  -     C-Reactive Protein; Future; Expected date: 07/16/2020  -     Sedimentation rate, automated; Future; Expected date: 07/16/2020  -     Comprehensive metabolic panel; Future; Expected date: 07/16/2020  -     CK; Future; Expected date: 07/16/2020  -     Urinalysis; Future  -     Protein / creatinine ratio, urine; Future    Anxiety  -     escitalopram oxalate (LEXAPRO) 10 MG tablet; Take 1 tablet (10 mg total) by mouth once daily.  Dispense: 30  tablet; Refill: 3    Chronic ITP (idiopathic thrombocytopenia)    Seronegative rheumatoid arthritis    High risk medication use    Elevated CK        Assessment:  55 year old female with  Sjogren's syndrome (+SSA, SIENA 1:640) on plaquenil  --chronic ITP  --Hx of thyroid cancer 2007 s/p total thyroidectomy [facility] followed by Dr. Mansfield in [place]  --elevated CK    Plan:  1. toradol 60, depo 160, b12 today  2. Add lexapro 10 mg daily   3. Add evoxac tid prn for dry mouth  4. Cont. Medrol PRN, relafen prn, zanaflex prn  5. Cont. provigil per Dr. Harding  6. Letter provided recommending pt avoid high risk covid areas or testing due to underlying autoimmune dz    Follow up:  3-4 mo Dr. Harding w/labs prior

## 2020-08-23 RX ORDER — PIROXICAM 20 MG/1
CAPSULE ORAL
Qty: 30 CAPSULE | Refills: 3 | Status: SHIPPED | OUTPATIENT
Start: 2020-08-23 | End: 2020-10-05

## 2020-10-01 LAB
ALBUMIN SERPL-MCNC: 3.6 G/DL (ref 3.6–5.1)
ALBUMIN/GLOB SERPL: 1.2 (CALC) (ref 1–2.5)
ALP SERPL-CCNC: 55 U/L (ref 37–153)
ALT SERPL-CCNC: 10 U/L (ref 6–29)
APPEARANCE UR: CLEAR
AST SERPL-CCNC: 12 U/L (ref 10–35)
BASOPHILS # BLD AUTO: 28 CELLS/UL (ref 0–200)
BASOPHILS NFR BLD AUTO: 0.4 %
BILIRUB SERPL-MCNC: 0.3 MG/DL (ref 0.2–1.2)
BILIRUB UR QL STRIP: NEGATIVE
BUN SERPL-MCNC: 11 MG/DL (ref 7–25)
BUN/CREAT SERPL: ABNORMAL (CALC) (ref 6–22)
CALCIUM SERPL-MCNC: 8.9 MG/DL (ref 8.6–10.4)
CHLORIDE SERPL-SCNC: 105 MMOL/L (ref 98–110)
CK SERPL-CCNC: 97 U/L (ref 29–143)
CO2 SERPL-SCNC: 25 MMOL/L (ref 20–32)
COLOR UR: YELLOW
CREAT SERPL-MCNC: 0.89 MG/DL (ref 0.5–1.05)
CREAT UR-MCNC: 156 MG/DL (ref 20–275)
CRP SERPL-MCNC: 5.6 MG/L
EOSINOPHIL # BLD AUTO: 117 CELLS/UL (ref 15–500)
EOSINOPHIL NFR BLD AUTO: 1.7 %
ERYTHROCYTE [DISTWIDTH] IN BLOOD BY AUTOMATED COUNT: 13 % (ref 11–15)
ERYTHROCYTE [SEDIMENTATION RATE] IN BLOOD BY WESTERGREN METHOD: 25 MM/H
GFRSERPLBLD MDRD-ARVRAT: 72 ML/MIN/1.73M2
GLOBULIN SER CALC-MCNC: 2.9 G/DL (CALC) (ref 1.9–3.7)
GLUCOSE SERPL-MCNC: 106 MG/DL (ref 65–99)
GLUCOSE UR QL STRIP: NEGATIVE
HCT VFR BLD AUTO: 33.1 % (ref 35–45)
HGB BLD-MCNC: 11 G/DL (ref 11.7–15.5)
HGB UR QL STRIP: NEGATIVE
KETONES UR QL STRIP: ABNORMAL
LEUKOCYTE ESTERASE UR QL STRIP: NEGATIVE
LYMPHOCYTES # BLD AUTO: 1504 CELLS/UL (ref 850–3900)
LYMPHOCYTES NFR BLD AUTO: 21.8 %
MCH RBC QN AUTO: 28.5 PG (ref 27–33)
MCHC RBC AUTO-ENTMCNC: 33.2 G/DL (ref 32–36)
MCV RBC AUTO: 85.8 FL (ref 80–100)
MONOCYTES # BLD AUTO: 814 CELLS/UL (ref 200–950)
MONOCYTES NFR BLD AUTO: 11.8 %
NEUTROPHILS # BLD AUTO: 4437 CELLS/UL (ref 1500–7800)
NEUTROPHILS NFR BLD AUTO: 64.3 %
NITRITE UR QL STRIP: NEGATIVE
PH UR STRIP: 6 [PH] (ref 5–8)
PLATELET # BLD AUTO: 104 THOUSAND/UL (ref 140–400)
PMV BLD REES-ECKER: 12.3 FL (ref 7.5–12.5)
POTASSIUM SERPL-SCNC: 4 MMOL/L (ref 3.5–5.3)
PROT SERPL-MCNC: 6.5 G/DL (ref 6.1–8.1)
PROT UR QL STRIP: ABNORMAL
PROT UR-MCNC: 26 MG/DL (ref 5–24)
PROT/CREAT UR: 0.17 MG/MG CREAT (ref 0.02–0.16)
PROT/CREAT UR: 167 MG/G CREAT (ref 21–161)
RBC # BLD AUTO: 3.86 MILLION/UL (ref 3.8–5.1)
SODIUM SERPL-SCNC: 137 MMOL/L (ref 135–146)
SP GR UR STRIP: 1.02 (ref 1–1.03)
WBC # BLD AUTO: 6.9 THOUSAND/UL (ref 3.8–10.8)

## 2020-10-05 ENCOUNTER — OFFICE VISIT (OUTPATIENT)
Dept: RHEUMATOLOGY | Facility: CLINIC | Age: 55
End: 2020-10-05
Payer: COMMERCIAL

## 2020-10-05 VITALS
BODY MASS INDEX: 39.53 KG/M2 | SYSTOLIC BLOOD PRESSURE: 123 MMHG | HEIGHT: 66 IN | DIASTOLIC BLOOD PRESSURE: 71 MMHG | HEART RATE: 76 BPM | WEIGHT: 246 LBS

## 2020-10-05 DIAGNOSIS — C73 FOLLICULAR CARCINOMA OF THYROID: ICD-10-CM

## 2020-10-05 DIAGNOSIS — G60.3 IDIOPATHIC PROGRESSIVE NEUROPATHY: ICD-10-CM

## 2020-10-05 DIAGNOSIS — D69.3 CHRONIC ITP (IDIOPATHIC THROMBOCYTOPENIA): ICD-10-CM

## 2020-10-05 DIAGNOSIS — M25.561 CHRONIC PAIN OF RIGHT KNEE: ICD-10-CM

## 2020-10-05 DIAGNOSIS — M06.00 SERONEGATIVE RHEUMATOID ARTHRITIS: ICD-10-CM

## 2020-10-05 DIAGNOSIS — E03.9 HYPOTHYROIDISM, UNSPECIFIED TYPE: ICD-10-CM

## 2020-10-05 DIAGNOSIS — D51.0 VITAMIN B12 DEFICIENCY ANEMIA DUE TO INTRINSIC FACTOR DEFICIENCY: ICD-10-CM

## 2020-10-05 DIAGNOSIS — Z79.899 HIGH RISK MEDICATION USE: ICD-10-CM

## 2020-10-05 DIAGNOSIS — M35.00 SJOGREN'S SYNDROME, WITH UNSPECIFIED ORGAN INVOLVEMENT: Primary | ICD-10-CM

## 2020-10-05 DIAGNOSIS — D69.6 THROMBOCYTOPENIA: ICD-10-CM

## 2020-10-05 DIAGNOSIS — G89.29 CHRONIC PAIN OF RIGHT KNEE: ICD-10-CM

## 2020-10-05 PROCEDURE — 20610 DRAIN/INJ JOINT/BURSA W/O US: CPT | Mod: RT,S$GLB,, | Performed by: INTERNAL MEDICINE

## 2020-10-05 PROCEDURE — 3008F PR BODY MASS INDEX (BMI) DOCUMENTED: ICD-10-PCS | Mod: CPTII,S$GLB,, | Performed by: INTERNAL MEDICINE

## 2020-10-05 PROCEDURE — 3008F BODY MASS INDEX DOCD: CPT | Mod: CPTII,S$GLB,, | Performed by: INTERNAL MEDICINE

## 2020-10-05 PROCEDURE — 99999 PR PBB SHADOW E&M-EST. PATIENT-LVL III: CPT | Mod: PBBFAC,,, | Performed by: INTERNAL MEDICINE

## 2020-10-05 PROCEDURE — 20610 LARGE JOINT ASPIRATION/INJECTION: R KNEE: ICD-10-PCS | Mod: RT,S$GLB,, | Performed by: INTERNAL MEDICINE

## 2020-10-05 PROCEDURE — 99215 OFFICE O/P EST HI 40 MIN: CPT | Mod: 25,S$GLB,, | Performed by: INTERNAL MEDICINE

## 2020-10-05 PROCEDURE — 96372 THER/PROPH/DIAG INJ SC/IM: CPT | Mod: 59,S$GLB,, | Performed by: INTERNAL MEDICINE

## 2020-10-05 PROCEDURE — 99215 PR OFFICE/OUTPT VISIT, EST, LEVL V, 40-54 MIN: ICD-10-PCS | Mod: 25,S$GLB,, | Performed by: INTERNAL MEDICINE

## 2020-10-05 PROCEDURE — 96372 PR INJECTION,THERAP/PROPH/DIAG2ST, IM OR SUBCUT: ICD-10-PCS | Mod: 59,S$GLB,, | Performed by: INTERNAL MEDICINE

## 2020-10-05 PROCEDURE — 99999 PR PBB SHADOW E&M-EST. PATIENT-LVL III: ICD-10-PCS | Mod: PBBFAC,,, | Performed by: INTERNAL MEDICINE

## 2020-10-05 RX ORDER — DICLOFENAC SODIUM AND MISOPROSTOL 75; 200 MG/1; UG/1
1 TABLET, DELAYED RELEASE ORAL 2 TIMES DAILY
Qty: 60 TABLET | Refills: 11 | Status: SHIPPED | OUTPATIENT
Start: 2020-10-05 | End: 2021-06-14 | Stop reason: SDUPTHER

## 2020-10-05 RX ORDER — CYANOCOBALAMIN 1000 UG/ML
1000 INJECTION, SOLUTION INTRAMUSCULAR; SUBCUTANEOUS
Status: COMPLETED | OUTPATIENT
Start: 2020-10-05 | End: 2020-10-05

## 2020-10-05 RX ORDER — MODAFINIL 200 MG/1
200 TABLET ORAL DAILY
Qty: 30 TABLET | Refills: 4 | Status: SHIPPED | OUTPATIENT
Start: 2020-10-05 | End: 2021-05-06

## 2020-10-05 RX ORDER — FLUCONAZOLE 200 MG/1
200 TABLET ORAL DAILY
COMMUNITY
Start: 2020-08-20 | End: 2021-06-26

## 2020-10-05 RX ORDER — DEXAMETHASONE SODIUM PHOSPHATE 4 MG/ML
8 INJECTION, SOLUTION INTRA-ARTICULAR; INTRALESIONAL; INTRAMUSCULAR; INTRAVENOUS; SOFT TISSUE
Status: COMPLETED | OUTPATIENT
Start: 2020-10-05 | End: 2020-10-05

## 2020-10-05 RX ORDER — KETOROLAC TROMETHAMINE 30 MG/ML
60 INJECTION, SOLUTION INTRAMUSCULAR; INTRAVENOUS
Status: COMPLETED | OUTPATIENT
Start: 2020-10-05 | End: 2020-10-05

## 2020-10-05 RX ORDER — LEVOTHYROXINE SODIUM 125 UG/1
1 CAPSULE ORAL DAILY
COMMUNITY
Start: 2020-08-20 | End: 2023-06-26

## 2020-10-05 RX ADMIN — DEXAMETHASONE SODIUM PHOSPHATE 8 MG: 4 INJECTION, SOLUTION INTRA-ARTICULAR; INTRALESIONAL; INTRAMUSCULAR; INTRAVENOUS; SOFT TISSUE at 05:10

## 2020-10-05 RX ADMIN — CYANOCOBALAMIN 1000 MCG: 1000 INJECTION, SOLUTION INTRAMUSCULAR; SUBCUTANEOUS at 05:10

## 2020-10-05 RX ADMIN — TRIAMCINOLONE ACETONIDE 40 MG: 40 INJECTION, SUSPENSION INTRA-ARTICULAR; INTRAMUSCULAR at 01:10

## 2020-10-05 RX ADMIN — KETOROLAC TROMETHAMINE 60 MG: 30 INJECTION, SOLUTION INTRAMUSCULAR; INTRAVENOUS at 05:10

## 2020-10-05 ASSESSMENT — ROUTINE ASSESSMENT OF PATIENT INDEX DATA (RAPID3)
PATIENT GLOBAL ASSESSMENT SCORE: 5.5
TOTAL RAPID3 SCORE: 6.5
PAIN SCORE: 8
PSYCHOLOGICAL DISTRESS SCORE: 3.3
MDHAQ FUNCTION SCORE: 1.8

## 2020-10-05 NOTE — PROGRESS NOTES
Administered 1 cc ( 1000 mcg/ml ) of b12 to the right upper outer gluteal. Informed of s/s to report verbalized understanding. No adverse reactions noted.        Administered 2 cc dexamethasone 4mg/cc  to right upper outer gluteal. Pt tolerated well. No acute reaction noted to site. Pt instructed on S/S to report. Pt verbalized understanding.         Administered 2 cc ( 30 mg/ml ) of toradol to the left upper outer gluteal. Informed of s/s to report verbalized understanding. No adverse reactions noted.

## 2020-10-05 NOTE — PROGRESS NOTES
Subjective:       Patient ID: Mack Melton is a 55 y.o. female.    Chief Complaint: Disease Management    Mrs. Melton is a 55 year old female who presents to clinic for follow up on Sjogren's syndrome.she had some bruising on plaquenil bid, pt held bruising improved. R  Knee is swollen and B ankles off norvasc 123/71. She has been doing fair since her last visit. She complain of joint pain involving her hands and ankles with morning stiffness lasting between 30min and 1 hour. She feels plaquenil, relafen, and PRN medrol are helping with her symptoms. Other sx include dry eyes, dry mouth (broken teeth), and significant fatigue. She also has chronic neck and low back pain.     Current tx:  1. Plaquenil  2. Medrol  3. Relafen  4. Zanaflex  5. Provigil            She complains of joint swelling. Associated symptoms include fatigue. Pertinent negatives include no fever or headaches.         Review of Systems   Constitutional: Positive for activity change and fatigue. Negative for appetite change, chills and fever.   HENT:        Dry mouth   Eyes: Negative for visual disturbance.        Dry eyes   Respiratory: Negative for cough and shortness of breath.    Cardiovascular: Negative for chest pain, palpitations and leg swelling.   Gastrointestinal: Negative for abdominal pain, constipation, diarrhea, nausea and vomiting.   Musculoskeletal: Positive for arthralgias, back pain, joint swelling, neck pain and neck stiffness.   Neurological: Negative for dizziness, weakness, light-headedness and headaches.         Objective:     Vitals:    10/05/20 1442   BP: 123/71   Pulse: 76       Past Medical History:   Diagnosis Date    Acid reflux     Sjogren's syndrome     Thyroid cancer      Past Surgical History:   Procedure Laterality Date    THYROID SURGERY      UTERINE FIBROID SURGERY            Physical Exam   Vitals reviewed.  Constitutional: She is oriented to person, place, and time and well-developed, well-nourished, and in  no distress.   HENT:   Head: Normocephalic and atraumatic.   Mouth/Throat: Oropharynx is clear and moist.   Eyes: EOM are normal. Pupils are equal, round, and reactive to light. Right conjunctiva is not injected. Left conjunctiva is not injected. Right eye exhibits normal extraocular motion. Left eye exhibits normal extraocular motion.   Neck: Neck supple. No JVD present. No thyromegaly present.   Cardiovascular: Normal rate, regular rhythm and normal heart sounds.  Exam reveals no gallop, no friction rub and no decreased pulses.    No murmur heard.  Pulmonary/Chest: Breath sounds normal. She has no wheezes. She has no rhonchi. She has no rales. She exhibits no tenderness.   Abdominal: There is no abdominal tenderness. There is no rebound and no guarding.       Right Side Rheumatological Exam     Examination finds the shoulder, elbow, wrist, knee, 1st PIP, 1st MCP, 2nd PIP, 2nd MCP, 3rd PIP, 3rd MCP, 4th PIP, 4th MCP, 5th PIP and 5th MCP normal.    The patient is tender to palpation of the shoulder, knee, 1st MCP, 2nd MCP, 3rd MCP, 4th MCP and 5th MCP    She has swelling of the knee    The patient has an enlarged wrist, 1st PIP, 2nd PIP, 3rd PIP, 4th PIP and 5th PIP    Shoulder Exam   Tenderness Location: acromion    Range of Motion   Active abduction: abnormal   Adduction: abnormal  Sensation: normal    Knee Exam   Tenderness Location: medial joint line  Patellofemoral Crepitus: positive  Effusion: positive  Sensation: normal    Hip Exam   Tenderness Location: no tenderness  Sensation: normal    Elbow/Wrist Exam   Tenderness Location: no tenderness  Sensation: normal    Left Side Rheumatological Exam     Examination finds the shoulder, elbow, wrist, knee, 1st PIP, 1st MCP, 2nd PIP, 2nd MCP, 3rd PIP, 3rd MCP, 4th PIP, 4th MCP, 5th PIP and 5th MCP normal.    The patient is tender to palpation of the shoulder, 1st MCP, 2nd MCP, 3rd MCP, 4th MCP and 5th MCP.    The patient has an enlarged wrist, 1st PIP, 2nd PIP, 3rd  PIP, 4th PIP and 5th PIP.    Shoulder Exam   Tenderness Location: acromion    Range of Motion   Active abduction: abnormal   Sensation: normal    Knee Exam   Tenderness Location: lateral joint line and medial joint line    Patellofemoral Crepitus: positive  Effusion: positive  Sensation: normal    Hip Exam   Tenderness Location: no tenderness  Sensation: normal    Elbow/Wrist Exam   Sensation: normal      Back/Neck Exam   General Inspection   Gait: normal       Tenderness Right paramedian tenderness of the Lower C-Spine and Lower L-Spine.Left paramedian tenderness of the Upper C-Spine and Lower L-Spine.      Lymphadenopathy:     She has no cervical adenopathy.   Neurological: She is alert and oriented to person, place, and time. She has normal sensation and normal strength. Gait normal.   Reflex Scores:       Patellar reflexes are 3+ on the right side and 3+ on the left side.  Skin: No rash noted. No erythema. No pallor.     Psychiatric: Affect normal. Her mood appears anxious.   Musculoskeletal: Tenderness and deformity present. No edema.           Results for orders placed or performed in visit on 07/16/20   CBC auto differential   Result Value Ref Range    WBC 6.9 3.8 - 10.8 Thousand/uL    RBC 3.86 3.80 - 5.10 Million/uL    Hemoglobin 11.0 (L) 11.7 - 15.5 g/dL    Hematocrit 33.1 (L) 35.0 - 45.0 %    Mean Corpuscular Volume 85.8 80.0 - 100.0 fL    Mean Corpuscular Hemoglobin 28.5 27.0 - 33.0 pg    Mean Corpuscular Hemoglobin Conc 33.2 32.0 - 36.0 g/dL    RDW 13.0 11.0 - 15.0 %    Platelets 104 (L) 140 - 400 Thousand/uL    MPV 12.3 7.5 - 12.5 fL    Neutrophils Absolute 4,437 1,500 - 7,800 cells/uL    Lymph # 1,504 850 - 3,900 cells/uL    Mono # 814 200 - 950 cells/uL    Eos # 117 15 - 500 cells/uL    Baso # 28 0 - 200 cells/uL    Neutrophils Relative 64.3 %    Lymph% 21.8 %    Mono% 11.8 %    Eosinophil% 1.7 %    Basophil% 0.4 %   C-Reactive Protein   Result Value Ref Range    CRP 5.6 <8.0 mg/L   Sedimentation  rate, automated   Result Value Ref Range    Sed Rate 25 < OR = 30 mm/h   Comprehensive metabolic panel   Result Value Ref Range    Glucose 106 (H) 65 - 99 mg/dL    BUN, Bld 11 7 - 25 mg/dL    Creatinine 0.89 0.50 - 1.05 mg/dL    eGFR if non  72 > OR = 60 mL/min/1.73m2    eGFR if African American 83 > OR = 60 mL/min/1.73m2    BUN/Creatinine Ratio NOT APPLICABLE 6 - 22 (calc)    Sodium 137 135 - 146 mmol/L    Potassium 4.0 3.5 - 5.3 mmol/L    Chloride 105 98 - 110 mmol/L    CO2 25 20 - 32 mmol/L    Calcium 8.9 8.6 - 10.4 mg/dL    Total Protein 6.5 6.1 - 8.1 g/dL    Albumin 3.6 3.6 - 5.1 g/dL    Globulin, Total 2.9 1.9 - 3.7 g/dL (calc)    Albumin/Globulin Ratio 1.2 1.0 - 2.5 (calc)    Total Bilirubin 0.3 0.2 - 1.2 mg/dL    Alkaline Phosphatase 55 37 - 153 U/L    AST 12 10 - 35 U/L    ALT 10 6 - 29 U/L   CK   Result Value Ref Range    Creatine Kinase, Total 97 29 - 143 U/L   Protein / creatinine ratio, urine   Result Value Ref Range    Creatinine, Random Ur 156 20 - 275 mg/dL    Protein/Creatinine Ratio 167 (H) 21 - 161 mg/g creat    Protein/Creat Ratio 0.167 (H) 0.021 - 0.161 mg/mg creat    Protein, Total Random Urine 26 (H) 5 - 24 mg/dL   Urinalysis   Result Value Ref Range    Color, UA YELLOW YELLOW    Appearance, UA CLEAR CLEAR    Specific Gravity, UA 1.019 1.001 - 1.035    pH, UA 6.0 5.0 - 8.0    Glucose, UA NEGATIVE NEGATIVE    Bilirubin, UA NEGATIVE NEGATIVE    Ketones, UA TRACE (A) NEGATIVE    Occult Blood UA NEGATIVE NEGATIVE    Protein, UA TRACE (A) NEGATIVE    Nitrite, UA NEGATIVE NEGATIVE    Leukocytes, UA NEGATIVE NEGATIVE     reviewed labs with patient during this visit         Assessment:       1. Sjogren's syndrome, with unspecified organ involvement    2. Seronegative rheumatoid arthritis    3. High risk medication use    4. Thrombocytopenia    5. Follicular carcinoma of thyroid    6. Vitamin B12 deficiency anemia due to intrinsic factor deficiency    7. Chronic ITP (idiopathic  thrombocytopenia)    8. Idiopathic progressive neuropathy    9. Hypothyroidism, unspecified type    10. Follicular carcinoma of thyroid    11. Chronic pain of right knee            Plan:       Sjogren's syndrome, with unspecified organ involvement  -     dexamethasone injection 8 mg  -     ketorolac injection 60 mg  -     cyanocobalamin injection 1,000 mcg  -     Prior authorization Order  -     Vitamin D; Future; Expected date: 10/05/2020  -     Iron and TIBC; Future; Expected date: 10/05/2020  -     CBC auto differential; Future; Expected date: 10/05/2020  -     Comprehensive Metabolic Panel; Future; Expected date: 10/05/2020  -     C-Reactive Protein; Future; Expected date: 10/05/2020  -     Sedimentation rate; Future; Expected date: 10/05/2020  -     Sjogrens syndrome-A extractable nuclear antibody; Future; Expected date: 10/05/2020  -     Sjogrens syndrome-B extractable nuclear antibody; Future; Expected date: 10/05/2020  -     diclofenac-misoprostol  mg-mcg (ARTHROTEC 75)  mg-mcg per tablet; Take 1 tablet by mouth 2 (two) times daily.  Dispense: 60 tablet; Refill: 11  -     modafiniL (PROVIGIL) 200 MG Tab; Take 1 tablet (200 mg total) by mouth once daily.  Dispense: 30 tablet; Refill: 4  -     Estrogens, Total; Future; Expected date: 10/05/2020  -     PROGESTERONE; Future; Expected date: 10/05/2020  -     LUTEINIZING HORMONE; Future; Expected date: 10/05/2020  -     FOLLICLE STIMULATING HORMONE; Future; Expected date: 10/05/2020    Seronegative rheumatoid arthritis  -     dexamethasone injection 8 mg  -     ketorolac injection 60 mg  -     cyanocobalamin injection 1,000 mcg  -     Prior authorization Order  -     Vitamin D; Future; Expected date: 10/05/2020  -     Iron and TIBC; Future; Expected date: 10/05/2020  -     CBC auto differential; Future; Expected date: 10/05/2020  -     Comprehensive Metabolic Panel; Future; Expected date: 10/05/2020  -     C-Reactive Protein; Future; Expected date:  10/05/2020  -     Sedimentation rate; Future; Expected date: 10/05/2020  -     Sjogrens syndrome-A extractable nuclear antibody; Future; Expected date: 10/05/2020  -     Sjogrens syndrome-B extractable nuclear antibody; Future; Expected date: 10/05/2020  -     diclofenac-misoprostol  mg-mcg (ARTHROTEC 75)  mg-mcg per tablet; Take 1 tablet by mouth 2 (two) times daily.  Dispense: 60 tablet; Refill: 11  -     Estrogens, Total; Future; Expected date: 10/05/2020  -     PROGESTERONE; Future; Expected date: 10/05/2020  -     LUTEINIZING HORMONE; Future; Expected date: 10/05/2020  -     FOLLICLE STIMULATING HORMONE; Future; Expected date: 10/05/2020    High risk medication use  -     dexamethasone injection 8 mg  -     ketorolac injection 60 mg  -     cyanocobalamin injection 1,000 mcg  -     Prior authorization Order  -     Vitamin D; Future; Expected date: 10/05/2020  -     Iron and TIBC; Future; Expected date: 10/05/2020  -     CBC auto differential; Future; Expected date: 10/05/2020  -     Comprehensive Metabolic Panel; Future; Expected date: 10/05/2020  -     C-Reactive Protein; Future; Expected date: 10/05/2020  -     Sedimentation rate; Future; Expected date: 10/05/2020  -     Sjogrens syndrome-A extractable nuclear antibody; Future; Expected date: 10/05/2020  -     Sjogrens syndrome-B extractable nuclear antibody; Future; Expected date: 10/05/2020  -     Estrogens, Total; Future; Expected date: 10/05/2020  -     PROGESTERONE; Future; Expected date: 10/05/2020  -     LUTEINIZING HORMONE; Future; Expected date: 10/05/2020  -     FOLLICLE STIMULATING HORMONE; Future; Expected date: 10/05/2020    Thrombocytopenia  -     dexamethasone injection 8 mg  -     ketorolac injection 60 mg  -     cyanocobalamin injection 1,000 mcg  -     Prior authorization Order  -     Vitamin D; Future; Expected date: 10/05/2020  -     Iron and TIBC; Future; Expected date: 10/05/2020  -     CBC auto differential; Future; Expected  date: 10/05/2020  -     Comprehensive Metabolic Panel; Future; Expected date: 10/05/2020  -     C-Reactive Protein; Future; Expected date: 10/05/2020  -     Sedimentation rate; Future; Expected date: 10/05/2020  -     Sjogrens syndrome-A extractable nuclear antibody; Future; Expected date: 10/05/2020  -     Sjogrens syndrome-B extractable nuclear antibody; Future; Expected date: 10/05/2020  -     modafiniL (PROVIGIL) 200 MG Tab; Take 1 tablet (200 mg total) by mouth once daily.  Dispense: 30 tablet; Refill: 4  -     Estrogens, Total; Future; Expected date: 10/05/2020  -     PROGESTERONE; Future; Expected date: 10/05/2020  -     LUTEINIZING HORMONE; Future; Expected date: 10/05/2020  -     FOLLICLE STIMULATING HORMONE; Future; Expected date: 10/05/2020    Follicular carcinoma of thyroid  -     dexamethasone injection 8 mg  -     ketorolac injection 60 mg  -     cyanocobalamin injection 1,000 mcg  -     Prior authorization Order  -     Vitamin D; Future; Expected date: 10/05/2020  -     Iron and TIBC; Future; Expected date: 10/05/2020  -     CBC auto differential; Future; Expected date: 10/05/2020  -     Comprehensive Metabolic Panel; Future; Expected date: 10/05/2020  -     C-Reactive Protein; Future; Expected date: 10/05/2020  -     Sedimentation rate; Future; Expected date: 10/05/2020  -     Sjogrens syndrome-A extractable nuclear antibody; Future; Expected date: 10/05/2020  -     Sjogrens syndrome-B extractable nuclear antibody; Future; Expected date: 10/05/2020  -     modafiniL (PROVIGIL) 200 MG Tab; Take 1 tablet (200 mg total) by mouth once daily.  Dispense: 30 tablet; Refill: 4  -     Estrogens, Total; Future; Expected date: 10/05/2020  -     PROGESTERONE; Future; Expected date: 10/05/2020  -     LUTEINIZING HORMONE; Future; Expected date: 10/05/2020  -     FOLLICLE STIMULATING HORMONE; Future; Expected date: 10/05/2020    Vitamin B12 deficiency anemia due to intrinsic factor deficiency  -     dexamethasone  injection 8 mg  -     ketorolac injection 60 mg  -     cyanocobalamin injection 1,000 mcg  -     Prior authorization Order  -     Vitamin D; Future; Expected date: 10/05/2020  -     Iron and TIBC; Future; Expected date: 10/05/2020  -     CBC auto differential; Future; Expected date: 10/05/2020  -     Comprehensive Metabolic Panel; Future; Expected date: 10/05/2020  -     C-Reactive Protein; Future; Expected date: 10/05/2020  -     Sedimentation rate; Future; Expected date: 10/05/2020  -     Sjogrens syndrome-A extractable nuclear antibody; Future; Expected date: 10/05/2020  -     Sjogrens syndrome-B extractable nuclear antibody; Future; Expected date: 10/05/2020  -     Estrogens, Total; Future; Expected date: 10/05/2020  -     PROGESTERONE; Future; Expected date: 10/05/2020  -     LUTEINIZING HORMONE; Future; Expected date: 10/05/2020  -     FOLLICLE STIMULATING HORMONE; Future; Expected date: 10/05/2020    Chronic ITP (idiopathic thrombocytopenia)  -     modafiniL (PROVIGIL) 200 MG Tab; Take 1 tablet (200 mg total) by mouth once daily.  Dispense: 30 tablet; Refill: 4  -     Estrogens, Total; Future; Expected date: 10/05/2020  -     PROGESTERONE; Future; Expected date: 10/05/2020  -     LUTEINIZING HORMONE; Future; Expected date: 10/05/2020  -     FOLLICLE STIMULATING HORMONE; Future; Expected date: 10/05/2020    Idiopathic progressive neuropathy  -     modafiniL (PROVIGIL) 200 MG Tab; Take 1 tablet (200 mg total) by mouth once daily.  Dispense: 30 tablet; Refill: 4  -     Estrogens, Total; Future; Expected date: 10/05/2020  -     PROGESTERONE; Future; Expected date: 10/05/2020  -     LUTEINIZING HORMONE; Future; Expected date: 10/05/2020  -     FOLLICLE STIMULATING HORMONE; Future; Expected date: 10/05/2020    Hypothyroidism, unspecified type  -     modafiniL (PROVIGIL) 200 MG Tab; Take 1 tablet (200 mg total) by mouth once daily.  Dispense: 30 tablet; Refill: 4  -     Estrogens, Total; Future; Expected date:  10/05/2020  -     PROGESTERONE; Future; Expected date: 10/05/2020  -     LUTEINIZING HORMONE; Future; Expected date: 10/05/2020  -     FOLLICLE STIMULATING HORMONE; Future; Expected date: 10/05/2020    Follicular carcinoma of thyroid  Comments:  in remission  Orders:  -     dexamethasone injection 8 mg  -     ketorolac injection 60 mg  -     cyanocobalamin injection 1,000 mcg  -     Prior authorization Order  -     Vitamin D; Future; Expected date: 10/05/2020  -     Iron and TIBC; Future; Expected date: 10/05/2020  -     CBC auto differential; Future; Expected date: 10/05/2020  -     Comprehensive Metabolic Panel; Future; Expected date: 10/05/2020  -     C-Reactive Protein; Future; Expected date: 10/05/2020  -     Sedimentation rate; Future; Expected date: 10/05/2020  -     Sjogrens syndrome-A extractable nuclear antibody; Future; Expected date: 10/05/2020  -     Sjogrens syndrome-B extractable nuclear antibody; Future; Expected date: 10/05/2020  -     modafiniL (PROVIGIL) 200 MG Tab; Take 1 tablet (200 mg total) by mouth once daily.  Dispense: 30 tablet; Refill: 4  -     Estrogens, Total; Future; Expected date: 10/05/2020  -     PROGESTERONE; Future; Expected date: 10/05/2020  -     LUTEINIZING HORMONE; Future; Expected date: 10/05/2020  -     FOLLICLE STIMULATING HORMONE; Future; Expected date: 10/05/2020    Chronic pain of right knee  -     Large Joint Aspiration/Injection: R knee        Assessment:  55 year old female with  Sjogren's syndrome (+SSA, SIENA 1:640) on plaquenil  --chronic ITP  --Hx of thyroid cancer 2007 s/p total thyroidectomy [facility] followed by Dr. Mansfield in [place]  --elevated CK    Plan:  1. toradol 60, decadron 8 mg  b12 today  2. Add lexapro 10 mg daily   3. Add evoxac tid prn for dry mouth  4. Cont. Medrol PRN, relafen prn, zanaflex prn  6 plaquenil 200 mg qd  7. R knee injected

## 2020-10-25 RX ORDER — TRIAMCINOLONE ACETONIDE 40 MG/ML
40 INJECTION, SUSPENSION INTRA-ARTICULAR; INTRAMUSCULAR
Status: DISCONTINUED | OUTPATIENT
Start: 2020-10-05 | End: 2020-10-26 | Stop reason: HOSPADM

## 2020-10-26 NOTE — PROCEDURES
Large Joint Aspiration/Injection: R knee    Date/Time: 10/5/2020 1:00 PM  Performed by: Rodo Harding MD  Authorized by: Rodo Harding MD     Consent Done?:  Yes (Verbal)  Indications:  Arthritis and joint swelling  Site marked: the procedure site was marked      Local anesthesia used?: Yes    Anesthesia:  Local infiltration  Local anesthetic:  Lidocaine 2% without epinephrine and lidocaine 1% without epinephrine  Anesthetic total (ml):  5      Details:  Needle Size:  25 G  Ultrasonic Guidance for needle placement?: No    Approach:  Lateral  Location:  Knee  Site:  R knee  Medications:  40 mg triamcinolone acetonide 40 mg/mL     After cleansing with Chloraprep and using ethyl chloride spray the right knee was injected with Kenalog 40mg with 1 ml of 1% lidocaine via the medial approach.

## 2020-12-08 PROBLEM — Z86.0100 HISTORY OF COLON POLYPS: Status: ACTIVE | Noted: 2020-12-08

## 2020-12-08 PROBLEM — Z86.010 HISTORY OF COLON POLYPS: Status: ACTIVE | Noted: 2020-12-08

## 2020-12-09 PROBLEM — Z12.11 SCREENING FOR COLON CANCER: Status: ACTIVE | Noted: 2020-12-09

## 2021-01-25 ENCOUNTER — TELEPHONE (OUTPATIENT)
Dept: RHEUMATOLOGY | Facility: CLINIC | Age: 56
End: 2021-01-25

## 2021-02-01 LAB
25(OH)D3 SERPL-MCNC: 66 NG/ML (ref 30–100)
ALBUMIN SERPL-MCNC: 3.6 G/DL (ref 3.6–5.1)
ALBUMIN/GLOB SERPL: 1.2 (CALC) (ref 1–2.5)
ALP SERPL-CCNC: 62 U/L (ref 37–153)
ALT SERPL-CCNC: 13 U/L (ref 6–29)
AST SERPL-CCNC: 13 U/L (ref 10–35)
BASOPHILS # BLD AUTO: 30 CELLS/UL (ref 0–200)
BASOPHILS NFR BLD AUTO: 0.4 %
BILIRUB SERPL-MCNC: 0.2 MG/DL (ref 0.2–1.2)
BUN SERPL-MCNC: 23 MG/DL (ref 7–25)
BUN/CREAT SERPL: NORMAL (CALC) (ref 6–22)
CALCIUM SERPL-MCNC: 9.2 MG/DL (ref 8.6–10.4)
CHLORIDE SERPL-SCNC: 106 MMOL/L (ref 98–110)
CO2 SERPL-SCNC: 26 MMOL/L (ref 20–32)
CREAT SERPL-MCNC: 0.88 MG/DL (ref 0.5–1.05)
CRP SERPL-MCNC: 1.7 MG/L
EOSINOPHIL # BLD AUTO: 120 CELLS/UL (ref 15–500)
EOSINOPHIL NFR BLD AUTO: 1.6 %
ERYTHROCYTE [DISTWIDTH] IN BLOOD BY AUTOMATED COUNT: 12.8 % (ref 11–15)
GFRSERPLBLD MDRD-ARVRAT: 73 ML/MIN/1.73M2
GLOBULIN SER CALC-MCNC: 2.9 G/DL (CALC) (ref 1.9–3.7)
GLUCOSE SERPL-MCNC: 90 MG/DL (ref 65–99)
HCT VFR BLD AUTO: 38.6 % (ref 35–45)
HGB BLD-MCNC: 12 G/DL (ref 11.7–15.5)
IRON SATN MFR SERPL: 32 % (CALC) (ref 16–45)
IRON SERPL-MCNC: 72 MCG/DL (ref 45–160)
LYMPHOCYTES # BLD AUTO: 1448 CELLS/UL (ref 850–3900)
LYMPHOCYTES NFR BLD AUTO: 19.3 %
MCH RBC QN AUTO: 27 PG (ref 27–33)
MCHC RBC AUTO-ENTMCNC: 31.1 G/DL (ref 32–36)
MCV RBC AUTO: 86.9 FL (ref 80–100)
MONOCYTES # BLD AUTO: 818 CELLS/UL (ref 200–950)
MONOCYTES NFR BLD AUTO: 10.9 %
NEUTROPHILS # BLD AUTO: 5085 CELLS/UL (ref 1500–7800)
NEUTROPHILS NFR BLD AUTO: 67.8 %
PLATELET # BLD AUTO: 103 THOUSAND/UL (ref 140–400)
PMV BLD REES-ECKER: 13.7 FL (ref 7.5–12.5)
POTASSIUM SERPL-SCNC: 3.8 MMOL/L (ref 3.5–5.3)
PROT SERPL-MCNC: 6.5 G/DL (ref 6.1–8.1)
RBC # BLD AUTO: 4.44 MILLION/UL (ref 3.8–5.1)
SODIUM SERPL-SCNC: 139 MMOL/L (ref 135–146)
TIBC SERPL-MCNC: 227 MCG/DL (CALC) (ref 250–450)
WBC # BLD AUTO: 7.5 THOUSAND/UL (ref 3.8–10.8)

## 2021-02-03 LAB
ENA SS-A AB SER IA-ACNC: ABNORMAL AI
ENA SS-B AB SER IA-ACNC: NORMAL AI
ERYTHROCYTE [SEDIMENTATION RATE] IN BLOOD BY WESTERGREN METHOD: 9 MM/H
ESTROGEN SERPL-MCNC: 156.8 PG/ML
FSH SERPL-ACNC: 48.8 MIU/ML
LH SERPL-ACNC: 24.9 MIU/ML
PROGEST SERPL-MCNC: <0.5 NG/ML

## 2021-02-08 ENCOUNTER — OFFICE VISIT (OUTPATIENT)
Dept: RHEUMATOLOGY | Facility: CLINIC | Age: 56
End: 2021-02-08
Payer: COMMERCIAL

## 2021-02-08 VITALS
SYSTOLIC BLOOD PRESSURE: 131 MMHG | HEIGHT: 67 IN | BODY MASS INDEX: 40.14 KG/M2 | DIASTOLIC BLOOD PRESSURE: 75 MMHG | WEIGHT: 255.75 LBS | HEART RATE: 73 BPM

## 2021-02-08 DIAGNOSIS — M35.00 SJOGREN'S SYNDROME, WITH UNSPECIFIED ORGAN INVOLVEMENT: Primary | ICD-10-CM

## 2021-02-08 DIAGNOSIS — Z79.899 HIGH RISK MEDICATION USE: ICD-10-CM

## 2021-02-08 DIAGNOSIS — G62.9 NEUROPATHY: ICD-10-CM

## 2021-02-08 DIAGNOSIS — R73.9 HYPERGLYCEMIA: ICD-10-CM

## 2021-02-08 DIAGNOSIS — M06.00 SERONEGATIVE RHEUMATOID ARTHRITIS: ICD-10-CM

## 2021-02-08 PROCEDURE — 99214 PR OFFICE/OUTPT VISIT, EST, LEVL IV, 30-39 MIN: ICD-10-PCS | Mod: 25,S$GLB,, | Performed by: PHYSICIAN ASSISTANT

## 2021-02-08 PROCEDURE — 96372 PR INJECTION,THERAP/PROPH/DIAG2ST, IM OR SUBCUT: ICD-10-PCS | Mod: S$GLB,,, | Performed by: PHYSICIAN ASSISTANT

## 2021-02-08 PROCEDURE — 99999 PR PBB SHADOW E&M-EST. PATIENT-LVL IV: CPT | Mod: PBBFAC,,, | Performed by: PHYSICIAN ASSISTANT

## 2021-02-08 PROCEDURE — 96372 THER/PROPH/DIAG INJ SC/IM: CPT | Mod: S$GLB,,, | Performed by: PHYSICIAN ASSISTANT

## 2021-02-08 PROCEDURE — 99214 OFFICE O/P EST MOD 30 MIN: CPT | Mod: 25,S$GLB,, | Performed by: PHYSICIAN ASSISTANT

## 2021-02-08 PROCEDURE — 1125F PR PAIN SEVERITY QUANTIFIED, PAIN PRESENT: ICD-10-PCS | Mod: S$GLB,,, | Performed by: PHYSICIAN ASSISTANT

## 2021-02-08 PROCEDURE — 1125F AMNT PAIN NOTED PAIN PRSNT: CPT | Mod: S$GLB,,, | Performed by: PHYSICIAN ASSISTANT

## 2021-02-08 PROCEDURE — 3008F BODY MASS INDEX DOCD: CPT | Mod: CPTII,S$GLB,, | Performed by: PHYSICIAN ASSISTANT

## 2021-02-08 PROCEDURE — 3008F PR BODY MASS INDEX (BMI) DOCUMENTED: ICD-10-PCS | Mod: CPTII,S$GLB,, | Performed by: PHYSICIAN ASSISTANT

## 2021-02-08 PROCEDURE — 99999 PR PBB SHADOW E&M-EST. PATIENT-LVL IV: ICD-10-PCS | Mod: PBBFAC,,, | Performed by: PHYSICIAN ASSISTANT

## 2021-02-08 RX ORDER — METHYLPREDNISOLONE ACETATE 80 MG/ML
160 INJECTION, SUSPENSION INTRA-ARTICULAR; INTRALESIONAL; INTRAMUSCULAR; SOFT TISSUE
Status: COMPLETED | OUTPATIENT
Start: 2021-02-08 | End: 2021-02-08

## 2021-02-08 RX ORDER — CEVIMELINE HYDROCHLORIDE 30 MG/1
30 CAPSULE ORAL 3 TIMES DAILY
Qty: 90 CAPSULE | Refills: 3 | Status: SHIPPED | OUTPATIENT
Start: 2021-02-08 | End: 2021-06-14 | Stop reason: SDUPTHER

## 2021-02-08 RX ORDER — TIZANIDINE 4 MG/1
4 TABLET ORAL EVERY 8 HOURS PRN
Qty: 90 TABLET | Refills: 5 | Status: SHIPPED | OUTPATIENT
Start: 2021-02-08 | End: 2022-04-26 | Stop reason: SDUPTHER

## 2021-02-08 RX ORDER — CYANOCOBALAMIN 1000 UG/ML
1000 INJECTION, SOLUTION INTRAMUSCULAR; SUBCUTANEOUS
Status: COMPLETED | OUTPATIENT
Start: 2021-02-08 | End: 2021-02-08

## 2021-02-08 RX ORDER — HYDROXYCHLOROQUINE SULFATE 200 MG/1
200 TABLET, FILM COATED ORAL 2 TIMES DAILY
Qty: 60 TABLET | Refills: 5 | Status: SHIPPED | OUTPATIENT
Start: 2021-02-08 | End: 2022-06-16

## 2021-02-08 RX ORDER — KETOROLAC TROMETHAMINE 30 MG/ML
60 INJECTION, SOLUTION INTRAMUSCULAR; INTRAVENOUS
Status: COMPLETED | OUTPATIENT
Start: 2021-02-08 | End: 2021-02-08

## 2021-02-08 RX ORDER — LIDOCAINE AND PRILOCAINE 25; 25 MG/G; MG/G
CREAM TOPICAL
Qty: 30 G | Refills: 3 | Status: SHIPPED | OUTPATIENT
Start: 2021-02-08 | End: 2022-11-09

## 2021-02-08 RX ADMIN — KETOROLAC TROMETHAMINE 60 MG: 30 INJECTION, SOLUTION INTRAMUSCULAR; INTRAVENOUS at 02:02

## 2021-02-08 RX ADMIN — CYANOCOBALAMIN 1000 MCG: 1000 INJECTION, SOLUTION INTRAMUSCULAR; SUBCUTANEOUS at 02:02

## 2021-02-08 RX ADMIN — METHYLPREDNISOLONE ACETATE 160 MG: 80 INJECTION, SUSPENSION INTRA-ARTICULAR; INTRALESIONAL; INTRAMUSCULAR; SOFT TISSUE at 02:02

## 2021-02-08 ASSESSMENT — ROUTINE ASSESSMENT OF PATIENT INDEX DATA (RAPID3)
MDHAQ FUNCTION SCORE: 1
PAIN SCORE: 7
TOTAL RAPID3 SCORE: 4.94
PSYCHOLOGICAL DISTRESS SCORE: 2.2
FATIGUE SCORE: 1.1
PATIENT GLOBAL ASSESSMENT SCORE: 4.5

## 2021-04-23 DIAGNOSIS — D69.6 THROMBOCYTOPENIA: ICD-10-CM

## 2021-04-23 DIAGNOSIS — C73 FOLLICULAR CARCINOMA OF THYROID: ICD-10-CM

## 2021-04-23 DIAGNOSIS — G60.3 IDIOPATHIC PROGRESSIVE NEUROPATHY: ICD-10-CM

## 2021-04-23 DIAGNOSIS — D69.3 CHRONIC ITP (IDIOPATHIC THROMBOCYTOPENIA): ICD-10-CM

## 2021-04-23 DIAGNOSIS — E03.9 HYPOTHYROIDISM, UNSPECIFIED TYPE: ICD-10-CM

## 2021-04-23 DIAGNOSIS — M35.00 SJOGREN'S SYNDROME, WITH UNSPECIFIED ORGAN INVOLVEMENT: ICD-10-CM

## 2021-05-06 RX ORDER — MODAFINIL 200 MG/1
TABLET ORAL
Qty: 30 TABLET | Refills: 3 | Status: SHIPPED | OUTPATIENT
Start: 2021-05-06 | End: 2021-11-22

## 2021-05-10 ENCOUNTER — PATIENT MESSAGE (OUTPATIENT)
Dept: RESEARCH | Facility: HOSPITAL | Age: 56
End: 2021-05-10

## 2021-06-14 ENCOUNTER — OFFICE VISIT (OUTPATIENT)
Dept: RHEUMATOLOGY | Facility: CLINIC | Age: 56
End: 2021-06-14
Payer: COMMERCIAL

## 2021-06-14 VITALS
DIASTOLIC BLOOD PRESSURE: 84 MMHG | SYSTOLIC BLOOD PRESSURE: 132 MMHG | BODY MASS INDEX: 40.02 KG/M2 | HEART RATE: 84 BPM | HEIGHT: 67 IN | WEIGHT: 255 LBS

## 2021-06-14 DIAGNOSIS — R74.8 ELEVATED CK: ICD-10-CM

## 2021-06-14 DIAGNOSIS — M06.00 SERONEGATIVE RHEUMATOID ARTHRITIS: ICD-10-CM

## 2021-06-14 DIAGNOSIS — G62.9 NEUROPATHY: ICD-10-CM

## 2021-06-14 DIAGNOSIS — D69.6 THROMBOCYTOPENIA: ICD-10-CM

## 2021-06-14 DIAGNOSIS — E03.9 HYPOTHYROIDISM, UNSPECIFIED TYPE: ICD-10-CM

## 2021-06-14 DIAGNOSIS — M35.00 SJOGREN'S SYNDROME, WITH UNSPECIFIED ORGAN INVOLVEMENT: Primary | ICD-10-CM

## 2021-06-14 PROCEDURE — 99214 OFFICE O/P EST MOD 30 MIN: CPT | Mod: 25,S$GLB,, | Performed by: INTERNAL MEDICINE

## 2021-06-14 PROCEDURE — 3008F BODY MASS INDEX DOCD: CPT | Mod: CPTII,S$GLB,, | Performed by: INTERNAL MEDICINE

## 2021-06-14 PROCEDURE — 99999 PR PBB SHADOW E&M-EST. PATIENT-LVL III: CPT | Mod: PBBFAC,,, | Performed by: INTERNAL MEDICINE

## 2021-06-14 PROCEDURE — 99214 PR OFFICE/OUTPT VISIT, EST, LEVL IV, 30-39 MIN: ICD-10-PCS | Mod: 25,S$GLB,, | Performed by: INTERNAL MEDICINE

## 2021-06-14 PROCEDURE — 99999 PR PBB SHADOW E&M-EST. PATIENT-LVL III: ICD-10-PCS | Mod: PBBFAC,,, | Performed by: INTERNAL MEDICINE

## 2021-06-14 PROCEDURE — 3008F PR BODY MASS INDEX (BMI) DOCUMENTED: ICD-10-PCS | Mod: CPTII,S$GLB,, | Performed by: INTERNAL MEDICINE

## 2021-06-14 PROCEDURE — 96372 THER/PROPH/DIAG INJ SC/IM: CPT | Mod: S$GLB,,, | Performed by: INTERNAL MEDICINE

## 2021-06-14 PROCEDURE — 96372 PR INJECTION,THERAP/PROPH/DIAG2ST, IM OR SUBCUT: ICD-10-PCS | Mod: S$GLB,,, | Performed by: INTERNAL MEDICINE

## 2021-06-14 RX ORDER — ESCITALOPRAM OXALATE 10 MG/1
TABLET ORAL
COMMUNITY
End: 2022-06-16

## 2021-06-14 RX ORDER — DEXTROAMPHETAMINE SACCHARATE, AMPHETAMINE ASPARTATE, DEXTROAMPHETAMINE SULFATE AND AMPHETAMINE SULFATE 5; 5; 5; 5 MG/1; MG/1; MG/1; MG/1
TABLET ORAL
COMMUNITY
End: 2022-11-09

## 2021-06-14 RX ORDER — METHYLPREDNISOLONE ACETATE 80 MG/ML
80 INJECTION, SUSPENSION INTRA-ARTICULAR; INTRALESIONAL; INTRAMUSCULAR; SOFT TISSUE
Status: COMPLETED | OUTPATIENT
Start: 2021-06-14 | End: 2021-06-14

## 2021-06-14 RX ORDER — CYANOCOBALAMIN 1000 UG/ML
1000 INJECTION, SOLUTION INTRAMUSCULAR; SUBCUTANEOUS
Status: DISCONTINUED | OUTPATIENT
Start: 2021-06-14 | End: 2021-06-14

## 2021-06-14 RX ORDER — DEXAMETHASONE SODIUM PHOSPHATE 4 MG/ML
4 INJECTION, SOLUTION INTRA-ARTICULAR; INTRALESIONAL; INTRAMUSCULAR; INTRAVENOUS; SOFT TISSUE
Status: COMPLETED | OUTPATIENT
Start: 2021-06-14 | End: 2021-06-14

## 2021-06-14 RX ORDER — CYANOCOBALAMIN 1000 UG/ML
1000 INJECTION, SOLUTION INTRAMUSCULAR; SUBCUTANEOUS
Status: COMPLETED | OUTPATIENT
Start: 2021-06-14 | End: 2021-06-14

## 2021-06-14 RX ORDER — DICLOFENAC SODIUM AND MISOPROSTOL 75; 200 MG/1; UG/1
1 TABLET, DELAYED RELEASE ORAL 2 TIMES DAILY
Qty: 180 TABLET | Refills: 3 | Status: SHIPPED | OUTPATIENT
Start: 2021-06-14 | End: 2022-06-16

## 2021-06-14 RX ORDER — PIROXICAM 20 MG/1
CAPSULE ORAL
COMMUNITY
End: 2021-06-14

## 2021-06-14 RX ORDER — CEVIMELINE HYDROCHLORIDE 30 MG/1
30 CAPSULE ORAL 3 TIMES DAILY
Qty: 270 CAPSULE | Refills: 3 | Status: SHIPPED | OUTPATIENT
Start: 2021-06-14 | End: 2022-06-16 | Stop reason: SDUPTHER

## 2021-06-14 RX ORDER — KETOROLAC TROMETHAMINE 30 MG/ML
60 INJECTION, SOLUTION INTRAMUSCULAR; INTRAVENOUS
Status: COMPLETED | OUTPATIENT
Start: 2021-06-14 | End: 2021-06-14

## 2021-06-14 RX ADMIN — METHYLPREDNISOLONE ACETATE 80 MG: 80 INJECTION, SUSPENSION INTRA-ARTICULAR; INTRALESIONAL; INTRAMUSCULAR; SOFT TISSUE at 12:06

## 2021-06-14 RX ADMIN — DEXAMETHASONE SODIUM PHOSPHATE 4 MG: 4 INJECTION, SOLUTION INTRA-ARTICULAR; INTRALESIONAL; INTRAMUSCULAR; INTRAVENOUS; SOFT TISSUE at 12:06

## 2021-06-14 RX ADMIN — CYANOCOBALAMIN 1000 MCG: 1000 INJECTION, SOLUTION INTRAMUSCULAR; SUBCUTANEOUS at 12:06

## 2021-06-14 RX ADMIN — KETOROLAC TROMETHAMINE 60 MG: 30 INJECTION, SOLUTION INTRAMUSCULAR; INTRAVENOUS at 12:06

## 2021-06-14 ASSESSMENT — ROUTINE ASSESSMENT OF PATIENT INDEX DATA (RAPID3)
PATIENT GLOBAL ASSESSMENT SCORE: 8
MDHAQ FUNCTION SCORE: 1.7
TOTAL RAPID3 SCORE: 7.39
PAIN SCORE: 8.5
PSYCHOLOGICAL DISTRESS SCORE: 2.2
FATIGUE SCORE: 1.1

## 2021-07-08 LAB
PTH-INTACT SERPL-MCNC: 71 PG/ML (ref 14–64)
THYROPEROXIDASE AB SERPL-ACNC: 1 IU/ML

## 2021-07-09 LAB
ALBUMIN SERPL ELPH-MCNC: 3.4 G/DL (ref 3.8–4.8)
ALPHA1 GLOB SERPL ELPH-MCNC: 0.3 G/DL (ref 0.2–0.3)
ALPHA2 GLOB SERPL ELPH-MCNC: 0.9 G/DL (ref 0.5–0.9)
BASOPHILS # BLD AUTO: 24 CELLS/UL (ref 0–200)
BASOPHILS NFR BLD AUTO: 0.3 %
BETA1 GLOB SERPL ELPH-MCNC: 0.4 G/DL (ref 0.4–0.6)
BETA2 GLOB SERPL ELPH-MCNC: 0.4 G/DL (ref 0.2–0.5)
CRP SERPL-MCNC: 4.4 MG/L
ENA SS-A AB SER IA-ACNC: ABNORMAL AI
ENA SS-B AB SER IA-ACNC: NORMAL AI
EOSINOPHIL # BLD AUTO: 103 CELLS/UL (ref 15–500)
EOSINOPHIL NFR BLD AUTO: 1.3 %
ERYTHROCYTE [DISTWIDTH] IN BLOOD BY AUTOMATED COUNT: 12.4 % (ref 11–15)
ERYTHROCYTE [SEDIMENTATION RATE] IN BLOOD BY WESTERGREN METHOD: 19 MM/H
GAMMA GLOB SERPL ELPH-MCNC: 1.3 G/DL (ref 0.8–1.7)
HCT VFR BLD AUTO: 36.2 % (ref 35–45)
HGB BLD-MCNC: 12.4 G/DL (ref 11.7–15.5)
LYMPHOCYTES # BLD AUTO: 1375 CELLS/UL (ref 850–3900)
LYMPHOCYTES NFR BLD AUTO: 17.4 %
MCH RBC QN AUTO: 29 PG (ref 27–33)
MCHC RBC AUTO-ENTMCNC: 34.3 G/DL (ref 32–36)
MCV RBC AUTO: 84.8 FL (ref 80–100)
MONOCYTES # BLD AUTO: 743 CELLS/UL (ref 200–950)
MONOCYTES NFR BLD AUTO: 9.4 %
NEUTROPHILS # BLD AUTO: 5656 CELLS/UL (ref 1500–7800)
NEUTROPHILS NFR BLD AUTO: 71.6 %
PLATELET # BLD AUTO: 158 THOUSAND/UL (ref 140–400)
PMV BLD REES-ECKER: NORMAL FL (ref 7.5–12.5)
PROT PATTERN SERPL ELPH-IMP: ABNORMAL
PROT SERPL-MCNC: 6.7 G/DL (ref 6.1–8.1)
RBC # BLD AUTO: 4.27 MILLION/UL (ref 3.8–5.1)
WBC # BLD AUTO: 7.9 THOUSAND/UL (ref 3.8–10.8)

## 2021-10-13 DIAGNOSIS — M35.00 SJOGREN'S SYNDROME, WITH UNSPECIFIED ORGAN INVOLVEMENT: ICD-10-CM

## 2021-10-14 RX ORDER — METHYLPREDNISOLONE 4 MG/1
8 TABLET ORAL DAILY
Qty: 60 TABLET | Refills: 5 | Status: SHIPPED | OUTPATIENT
Start: 2021-10-14 | End: 2022-02-21 | Stop reason: SDUPTHER

## 2021-10-18 ENCOUNTER — OFFICE VISIT (OUTPATIENT)
Dept: RHEUMATOLOGY | Facility: CLINIC | Age: 56
End: 2021-10-18
Payer: COMMERCIAL

## 2021-10-18 VITALS
WEIGHT: 250 LBS | DIASTOLIC BLOOD PRESSURE: 79 MMHG | HEART RATE: 85 BPM | HEIGHT: 67 IN | BODY MASS INDEX: 39.24 KG/M2 | SYSTOLIC BLOOD PRESSURE: 127 MMHG

## 2021-10-18 DIAGNOSIS — M35.00 SJOGREN'S SYNDROME, WITH UNSPECIFIED ORGAN INVOLVEMENT: Primary | ICD-10-CM

## 2021-10-18 DIAGNOSIS — R29.898 LEG HEAVINESS: ICD-10-CM

## 2021-10-18 DIAGNOSIS — Z79.899 HIGH RISK MEDICATION USE: ICD-10-CM

## 2021-10-18 DIAGNOSIS — M54.9 DORSALGIA, UNSPECIFIED: ICD-10-CM

## 2021-10-18 DIAGNOSIS — R77.8 ABNORMAL SPEP: ICD-10-CM

## 2021-10-18 DIAGNOSIS — M06.00 SERONEGATIVE RHEUMATOID ARTHRITIS: ICD-10-CM

## 2021-10-18 PROCEDURE — 96372 THER/PROPH/DIAG INJ SC/IM: CPT | Mod: S$GLB,,, | Performed by: PHYSICIAN ASSISTANT

## 2021-10-18 PROCEDURE — 3074F PR MOST RECENT SYSTOLIC BLOOD PRESSURE < 130 MM HG: ICD-10-PCS | Mod: CPTII,S$GLB,, | Performed by: PHYSICIAN ASSISTANT

## 2021-10-18 PROCEDURE — 1160F RVW MEDS BY RX/DR IN RCRD: CPT | Mod: CPTII,S$GLB,, | Performed by: PHYSICIAN ASSISTANT

## 2021-10-18 PROCEDURE — 96372 PR INJECTION,THERAP/PROPH/DIAG2ST, IM OR SUBCUT: ICD-10-PCS | Mod: S$GLB,,, | Performed by: PHYSICIAN ASSISTANT

## 2021-10-18 PROCEDURE — 3008F PR BODY MASS INDEX (BMI) DOCUMENTED: ICD-10-PCS | Mod: CPTII,S$GLB,, | Performed by: PHYSICIAN ASSISTANT

## 2021-10-18 PROCEDURE — 99214 PR OFFICE/OUTPT VISIT, EST, LEVL IV, 30-39 MIN: ICD-10-PCS | Mod: 25,S$GLB,, | Performed by: PHYSICIAN ASSISTANT

## 2021-10-18 PROCEDURE — 3008F BODY MASS INDEX DOCD: CPT | Mod: CPTII,S$GLB,, | Performed by: PHYSICIAN ASSISTANT

## 2021-10-18 PROCEDURE — 3078F PR MOST RECENT DIASTOLIC BLOOD PRESSURE < 80 MM HG: ICD-10-PCS | Mod: CPTII,S$GLB,, | Performed by: PHYSICIAN ASSISTANT

## 2021-10-18 PROCEDURE — 1159F MED LIST DOCD IN RCRD: CPT | Mod: CPTII,S$GLB,, | Performed by: PHYSICIAN ASSISTANT

## 2021-10-18 PROCEDURE — 99214 OFFICE O/P EST MOD 30 MIN: CPT | Mod: 25,S$GLB,, | Performed by: PHYSICIAN ASSISTANT

## 2021-10-18 PROCEDURE — 99999 PR PBB SHADOW E&M-EST. PATIENT-LVL V: CPT | Mod: PBBFAC,,, | Performed by: PHYSICIAN ASSISTANT

## 2021-10-18 PROCEDURE — 1160F PR REVIEW ALL MEDS BY PRESCRIBER/CLIN PHARMACIST DOCUMENTED: ICD-10-PCS | Mod: CPTII,S$GLB,, | Performed by: PHYSICIAN ASSISTANT

## 2021-10-18 PROCEDURE — 3074F SYST BP LT 130 MM HG: CPT | Mod: CPTII,S$GLB,, | Performed by: PHYSICIAN ASSISTANT

## 2021-10-18 PROCEDURE — 3078F DIAST BP <80 MM HG: CPT | Mod: CPTII,S$GLB,, | Performed by: PHYSICIAN ASSISTANT

## 2021-10-18 PROCEDURE — 99999 PR PBB SHADOW E&M-EST. PATIENT-LVL V: ICD-10-PCS | Mod: PBBFAC,,, | Performed by: PHYSICIAN ASSISTANT

## 2021-10-18 PROCEDURE — 1159F PR MEDICATION LIST DOCUMENTED IN MEDICAL RECORD: ICD-10-PCS | Mod: CPTII,S$GLB,, | Performed by: PHYSICIAN ASSISTANT

## 2021-10-18 RX ORDER — METHYLPREDNISOLONE ACETATE 80 MG/ML
80 INJECTION, SUSPENSION INTRA-ARTICULAR; INTRALESIONAL; INTRAMUSCULAR; SOFT TISSUE
Status: COMPLETED | OUTPATIENT
Start: 2021-10-18 | End: 2021-10-18

## 2021-10-18 RX ORDER — IBUPROFEN 800 MG/1
800 TABLET ORAL EVERY 6 HOURS PRN
COMMUNITY
Start: 2021-09-22 | End: 2021-10-18

## 2021-10-18 RX ORDER — FLUTICASONE PROPIONATE 50 MCG
2 SPRAY, SUSPENSION (ML) NASAL
COMMUNITY
Start: 2021-06-17

## 2021-10-18 RX ORDER — CYANOCOBALAMIN 1000 UG/ML
1000 INJECTION, SOLUTION INTRAMUSCULAR; SUBCUTANEOUS
Status: COMPLETED | OUTPATIENT
Start: 2021-10-18 | End: 2021-10-18

## 2021-10-18 RX ORDER — ZOLPIDEM TARTRATE 5 MG/1
5 TABLET ORAL
COMMUNITY
End: 2022-10-17 | Stop reason: SDUPTHER

## 2021-10-18 RX ORDER — PIROXICAM 20 MG/1
CAPSULE ORAL
COMMUNITY
End: 2021-10-18

## 2021-10-18 RX ORDER — UBIDECARENONE 30 MG
30 CAPSULE ORAL
COMMUNITY

## 2021-10-18 RX ORDER — FLUCONAZOLE 150 MG/1
TABLET ORAL
COMMUNITY
Start: 2021-10-01 | End: 2022-11-09

## 2021-10-18 RX ORDER — DEXAMETHASONE SODIUM PHOSPHATE 4 MG/ML
4 INJECTION, SOLUTION INTRA-ARTICULAR; INTRALESIONAL; INTRAMUSCULAR; INTRAVENOUS; SOFT TISSUE
Status: COMPLETED | OUTPATIENT
Start: 2021-10-18 | End: 2021-10-18

## 2021-10-18 RX ORDER — KETOROLAC TROMETHAMINE 30 MG/ML
60 INJECTION, SOLUTION INTRAMUSCULAR; INTRAVENOUS
Status: COMPLETED | OUTPATIENT
Start: 2021-10-18 | End: 2021-10-18

## 2021-10-18 RX ADMIN — KETOROLAC TROMETHAMINE 60 MG: 30 INJECTION, SOLUTION INTRAMUSCULAR; INTRAVENOUS at 03:10

## 2021-10-18 RX ADMIN — DEXAMETHASONE SODIUM PHOSPHATE 4 MG: 4 INJECTION, SOLUTION INTRA-ARTICULAR; INTRALESIONAL; INTRAMUSCULAR; INTRAVENOUS; SOFT TISSUE at 03:10

## 2021-10-18 RX ADMIN — METHYLPREDNISOLONE ACETATE 80 MG: 80 INJECTION, SUSPENSION INTRA-ARTICULAR; INTRALESIONAL; INTRAMUSCULAR; SOFT TISSUE at 03:10

## 2021-10-18 RX ADMIN — CYANOCOBALAMIN 1000 MCG: 1000 INJECTION, SOLUTION INTRAMUSCULAR; SUBCUTANEOUS at 03:10

## 2021-10-18 ASSESSMENT — ROUTINE ASSESSMENT OF PATIENT INDEX DATA (RAPID3)
FATIGUE SCORE: 1.1
PSYCHOLOGICAL DISTRESS SCORE: 2.2
MDHAQ FUNCTION SCORE: 1.7
TOTAL RAPID3 SCORE: 6.39
PAIN SCORE: 6
PATIENT GLOBAL ASSESSMENT SCORE: 7.5

## 2021-11-29 ENCOUNTER — HOSPITAL ENCOUNTER (OUTPATIENT)
Dept: RADIOLOGY | Facility: HOSPITAL | Age: 56
Discharge: HOME | End: 2021-11-29
Attending: PHYSICIAN ASSISTANT
Payer: COMMERCIAL

## 2021-11-29 DIAGNOSIS — M54.9 DORSALGIA, UNSPECIFIED: ICD-10-CM

## 2021-11-29 PROCEDURE — 72110 X-RAY EXAM L-2 SPINE 4/>VWS: CPT | Mod: 26,,, | Performed by: RADIOLOGY

## 2021-11-29 PROCEDURE — 72110 X-RAY EXAM L-2 SPINE 4/>VWS: CPT | Mod: TC,PO

## 2021-11-29 PROCEDURE — 72110 XR LUMBAR SPINE COMPLETE 5 VIEW: ICD-10-PCS | Mod: 26,,, | Performed by: RADIOLOGY

## 2021-12-01 LAB — INTERPRETATION SERPL IFE-IMP: NORMAL

## 2021-12-31 DIAGNOSIS — M51.369 DDD (DEGENERATIVE DISC DISEASE), LUMBAR: Primary | ICD-10-CM

## 2022-02-01 ENCOUNTER — TELEPHONE (OUTPATIENT)
Dept: RHEUMATOLOGY | Facility: CLINIC | Age: 57
End: 2022-02-01
Payer: COMMERCIAL

## 2022-02-01 NOTE — TELEPHONE ENCOUNTER
Patient advised of result. She does not pain management referral at this time.     TON[facility] Becker Staff  X-ray shows moderate to severe degenerative disc disease L1-L2. Recommend pain management evaluation if she is interested.

## 2022-02-21 ENCOUNTER — OFFICE VISIT (OUTPATIENT)
Dept: RHEUMATOLOGY | Facility: CLINIC | Age: 57
End: 2022-02-21
Payer: COMMERCIAL

## 2022-02-21 VITALS
HEART RATE: 85 BPM | WEIGHT: 244 LBS | HEIGHT: 67 IN | BODY MASS INDEX: 38.3 KG/M2 | SYSTOLIC BLOOD PRESSURE: 138 MMHG | DIASTOLIC BLOOD PRESSURE: 74 MMHG

## 2022-02-21 DIAGNOSIS — C73 FOLLICULAR CARCINOMA OF THYROID: ICD-10-CM

## 2022-02-21 DIAGNOSIS — E03.9 HYPOTHYROIDISM, UNSPECIFIED TYPE: ICD-10-CM

## 2022-02-21 DIAGNOSIS — M51.36 DDD (DEGENERATIVE DISC DISEASE), LUMBAR: Primary | ICD-10-CM

## 2022-02-21 DIAGNOSIS — G47.26 SHIFT WORK SLEEP DISORDER: ICD-10-CM

## 2022-02-21 DIAGNOSIS — M35.00 SJOGREN'S SYNDROME, WITH UNSPECIFIED ORGAN INVOLVEMENT: ICD-10-CM

## 2022-02-21 DIAGNOSIS — G60.3 IDIOPATHIC PROGRESSIVE NEUROPATHY: ICD-10-CM

## 2022-02-21 DIAGNOSIS — D69.6 THROMBOCYTOPENIA: ICD-10-CM

## 2022-02-21 DIAGNOSIS — R29.898 LEG HEAVINESS: ICD-10-CM

## 2022-02-21 DIAGNOSIS — R77.8 ABNORMAL SPEP: ICD-10-CM

## 2022-02-21 DIAGNOSIS — M06.00 SERONEGATIVE RHEUMATOID ARTHRITIS: ICD-10-CM

## 2022-02-21 DIAGNOSIS — M54.9 DORSALGIA, UNSPECIFIED: ICD-10-CM

## 2022-02-21 DIAGNOSIS — D69.3 CHRONIC ITP (IDIOPATHIC THROMBOCYTOPENIA): ICD-10-CM

## 2022-02-21 PROCEDURE — 3078F DIAST BP <80 MM HG: CPT | Mod: CPTII,S$GLB,, | Performed by: INTERNAL MEDICINE

## 2022-02-21 PROCEDURE — 3078F PR MOST RECENT DIASTOLIC BLOOD PRESSURE < 80 MM HG: ICD-10-PCS | Mod: CPTII,S$GLB,, | Performed by: INTERNAL MEDICINE

## 2022-02-21 PROCEDURE — 3008F BODY MASS INDEX DOCD: CPT | Mod: CPTII,S$GLB,, | Performed by: INTERNAL MEDICINE

## 2022-02-21 PROCEDURE — 3075F SYST BP GE 130 - 139MM HG: CPT | Mod: CPTII,S$GLB,, | Performed by: INTERNAL MEDICINE

## 2022-02-21 PROCEDURE — 99999 PR PBB SHADOW E&M-EST. PATIENT-LVL III: ICD-10-PCS | Mod: PBBFAC,,, | Performed by: INTERNAL MEDICINE

## 2022-02-21 PROCEDURE — 99999 PR PBB SHADOW E&M-EST. PATIENT-LVL III: CPT | Mod: PBBFAC,,, | Performed by: INTERNAL MEDICINE

## 2022-02-21 PROCEDURE — 96372 THER/PROPH/DIAG INJ SC/IM: CPT | Mod: S$GLB,,, | Performed by: INTERNAL MEDICINE

## 2022-02-21 PROCEDURE — 96372 PR INJECTION,THERAP/PROPH/DIAG2ST, IM OR SUBCUT: ICD-10-PCS | Mod: S$GLB,,, | Performed by: INTERNAL MEDICINE

## 2022-02-21 PROCEDURE — 3008F PR BODY MASS INDEX (BMI) DOCUMENTED: ICD-10-PCS | Mod: CPTII,S$GLB,, | Performed by: INTERNAL MEDICINE

## 2022-02-21 PROCEDURE — 3075F PR MOST RECENT SYSTOLIC BLOOD PRESS GE 130-139MM HG: ICD-10-PCS | Mod: CPTII,S$GLB,, | Performed by: INTERNAL MEDICINE

## 2022-02-21 PROCEDURE — 99214 OFFICE O/P EST MOD 30 MIN: CPT | Mod: 25,S$GLB,, | Performed by: INTERNAL MEDICINE

## 2022-02-21 PROCEDURE — 99214 PR OFFICE/OUTPT VISIT, EST, LEVL IV, 30-39 MIN: ICD-10-PCS | Mod: 25,S$GLB,, | Performed by: INTERNAL MEDICINE

## 2022-02-21 RX ORDER — MODAFINIL 200 MG/1
200 TABLET ORAL DAILY
Qty: 30 TABLET | Refills: 3 | Status: SHIPPED | OUTPATIENT
Start: 2022-02-21 | End: 2023-10-30

## 2022-02-21 RX ORDER — METHYLPREDNISOLONE 4 MG/1
8 TABLET ORAL DAILY
Qty: 60 TABLET | Refills: 6 | Status: SHIPPED | OUTPATIENT
Start: 2022-02-21 | End: 2022-06-16 | Stop reason: SDUPTHER

## 2022-02-21 RX ORDER — IBUPROFEN 800 MG/1
TABLET ORAL
COMMUNITY
Start: 2021-10-20 | End: 2022-02-21 | Stop reason: SDUPTHER

## 2022-02-21 RX ORDER — PIROXICAM 20 MG/1
CAPSULE ORAL
COMMUNITY
End: 2022-06-16

## 2022-02-21 RX ORDER — KETOROLAC TROMETHAMINE 30 MG/ML
60 INJECTION, SOLUTION INTRAMUSCULAR; INTRAVENOUS
Status: COMPLETED | OUTPATIENT
Start: 2022-02-21 | End: 2022-02-21

## 2022-02-21 RX ORDER — DICLOFENAC SODIUM 75 MG/1
75 TABLET, DELAYED RELEASE ORAL 2 TIMES DAILY
COMMUNITY
Start: 2021-12-19 | End: 2022-06-16

## 2022-02-21 RX ORDER — METHYLPREDNISOLONE ACETATE 80 MG/ML
160 INJECTION, SUSPENSION INTRA-ARTICULAR; INTRALESIONAL; INTRAMUSCULAR; SOFT TISSUE
Status: COMPLETED | OUTPATIENT
Start: 2022-02-21 | End: 2022-02-21

## 2022-02-21 RX ORDER — CYANOCOBALAMIN 1000 UG/ML
1000 INJECTION, SOLUTION INTRAMUSCULAR; SUBCUTANEOUS
Status: COMPLETED | OUTPATIENT
Start: 2022-02-21 | End: 2022-02-21

## 2022-02-21 RX ORDER — IBUPROFEN 800 MG/1
TABLET ORAL
Qty: 90 TABLET | Refills: 3 | Status: SHIPPED | OUTPATIENT
Start: 2022-02-21 | End: 2022-11-09

## 2022-02-21 RX ORDER — ARMODAFINIL 250 MG/1
250 TABLET ORAL DAILY
Qty: 30 TABLET | Refills: 3 | Status: SHIPPED | OUTPATIENT
Start: 2022-02-21 | End: 2022-03-23

## 2022-02-21 RX ADMIN — KETOROLAC TROMETHAMINE 60 MG: 30 INJECTION, SOLUTION INTRAMUSCULAR; INTRAVENOUS at 05:02

## 2022-02-21 RX ADMIN — METHYLPREDNISOLONE ACETATE 160 MG: 80 INJECTION, SUSPENSION INTRA-ARTICULAR; INTRALESIONAL; INTRAMUSCULAR; SOFT TISSUE at 05:02

## 2022-02-21 RX ADMIN — CYANOCOBALAMIN 1000 MCG: 1000 INJECTION, SOLUTION INTRAMUSCULAR; SUBCUTANEOUS at 05:02

## 2022-02-21 NOTE — PROGRESS NOTES
Administered 1 cc ( 1000 mcg/ml ) of b12 to the right upper outer gluteal. Informed of s/s to report verbalized understanding. No adverse reactions noted.        Administered 2 cc ( 80 mg/ml ) of depomedrol to the right upper outer gluteal. Informed of s/s to report verbalized understanding. No adverse reactions noted.        Administered 2 cc ( 30 mg/ml ) of toradol to the right upper outer gluteal. Informed of s/s to report verbalized understanding. No adverse reactions noted.

## 2022-02-21 NOTE — PROGRESS NOTES
Subjective:       Patient ID: Mack Melton is a 57 y.o. female.    Chief Complaint: Disease Management    Follow up:57 year old female who presents to clinic for follow up on Sjogren's syndrome.She complains of joint pain involving her hands and ankles with morning stiffness lasting between 30min and 1 hour. Plaquenil is on hold due to low platelet count and bruising. She has increasing stiffness and pain/heaviness in her legs and cramping in her feet. She will have time getting up after periods of immobility. She does not feel that nsaids, medrol, or plaquenil was helpful for her symptoms. She denies claudication. No significant lower ext edema. Pt did PT for her spine without relief she completed 6 weeks total.Fatigue is unchanged--she has shift work sleep disorder--working nights. She takes provigil and adderall during the day. Ambien 2.5 mg at her bedtime prn. She held her medrol and flared.     Sicca sx are improved with evoxac.    We reviewed her recent labs.     Current tx:  1. Plaquenil ( on hold)  2. Medrol 4 mg  3. Zanaflex  4. Provigil            She complains of joint swelling. Associated symptoms include fatigue. Pertinent negatives include no fever or headaches.         Review of Systems   Constitutional: Positive for activity change and fatigue. Negative for appetite change, chills and fever.   HENT:        Dry mouth   Eyes: Negative for visual disturbance.        Dry eyes   Respiratory: Negative for cough and shortness of breath.    Cardiovascular: Negative for chest pain, palpitations and leg swelling.   Gastrointestinal: Negative for abdominal pain, constipation, diarrhea, nausea and vomiting.   Musculoskeletal: Positive for arthralgias, back pain, joint swelling, neck pain and neck stiffness.   Neurological: Negative for dizziness, weakness, light-headedness and headaches.         Objective:     Vitals:    02/21/22 1437   BP: 138/74   Pulse: 85       Past Medical History:   Diagnosis Date    Acid  reflux     Arthritis     History of colon polyps     Hypertension     Sjogren's syndrome     Sleep apnea     not using CPAP    Thyroid cancer      Past Surgical History:   Procedure Laterality Date    COLONOSCOPY N/A 12/9/2020    Procedure: COLONOSCOPY;  Surgeon: Tonny Drew MD;  Location: [facility];  Service: Endoscopy;  Laterality: N/A;    THYROID SURGERY      UTERINE FIBROID SURGERY            Physical Exam   Eyes: Right conjunctiva is not injected. Left conjunctiva is not injected. Right eye exhibits normal extraocular motion. Left eye exhibits normal extraocular motion.   Neck: No JVD present. No thyromegaly present.   Cardiovascular: Normal rate and regular rhythm. Exam reveals no decreased pulses.   Pulmonary/Chest: She has no wheezes. She has no rhonchi. She has no rales.   Musculoskeletal:         General: Tenderness and deformity present.      Right shoulder: Normal.      Left shoulder: Normal.      Right elbow: Normal.      Left elbow: Normal.      Right wrist: Normal.      Left wrist: Normal.      Right knee: Normal.      Left knee: Normal.   Lymphadenopathy:     She has no cervical adenopathy.   Neurological: Gait normal.   Skin: No rash noted.   Psychiatric: Mood and affect normal. Her mood appears not anxious.       Right Side Rheumatological Exam     Examination finds the shoulder, elbow, wrist, knee and 1st PIP normal.    The patient is tender to palpation of the 1st MCP, 2nd PIP, 2nd MCP, 3rd PIP, 3rd MCP, 4th PIP, 4th MCP, 5th PIP and 5th MCP    She has swelling of the 1st MCP, 2nd MCP, 3rd MCP, 4th MCP and 5th MCP    Left Side Rheumatological Exam     Examination finds the shoulder, elbow, wrist, knee, 1st PIP, 2nd PIP, 3rd PIP, 4th PIP and 5th PIP normal.    The patient is tender to palpation of the 1st MCP, 2nd MCP, 3rd MCP, 4th MCP and 5th MCP.         Results for orders placed or performed in visit on 11/29/21   Immunofixation Electrophoresis   Result Value Ref Range     Interpretation       Labs at [facility] [address], , plts 108, crp .8 cbc cmp otherwise wnl      Assessment:       1. DDD (degenerative disc disease), lumbar    2. Sjogren's syndrome, with unspecified organ involvement    3. Seronegative rheumatoid arthritis    4. Dorsalgia, unspecified    5. Leg heaviness    6. Abnormal SPEP    7. Follicular carcinoma of thyroid    8. Thrombocytopenia    9. Chronic ITP (idiopathic thrombocytopenia)    10. Shift work sleep disorder    11. Idiopathic progressive neuropathy    12. Hypothyroidism, unspecified type            Plan:       DDD (degenerative disc disease), lumbar  -     methylPREDNISolone acetate injection 160 mg  -     ketorolac injection 60 mg  -     cyanocobalamin injection 1,000 mcg  -     CBC Auto Differential; Future; Expected date: 02/21/2022  -     Comprehensive Metabolic Panel; Future; Expected date: 02/21/2022  -     SIENA Screen w/Reflex; Future; Expected date: 02/21/2022  -     Sjogrens syndrome-A extractable nuclear antibody; Future; Expected date: 02/21/2022  -     Sjogrens syndrome-B extractable nuclear antibody; Future; Expected date: 02/21/2022  -     Sedimentation Rate; Future; Expected date: 02/21/2022  -     C-Reactive Protein; Future; Expected date: 02/21/2022    Sjogren's syndrome, with unspecified organ involvement  -     methylPREDNISolone acetate injection 160 mg  -     ketorolac injection 60 mg  -     cyanocobalamin injection 1,000 mcg  -     CBC Auto Differential; Future; Expected date: 02/21/2022  -     Comprehensive Metabolic Panel; Future; Expected date: 02/21/2022  -     SIENA Screen w/Reflex; Future; Expected date: 02/21/2022  -     Sjogrens syndrome-A extractable nuclear antibody; Future; Expected date: 02/21/2022  -     Sjogrens syndrome-B extractable nuclear antibody; Future; Expected date: 02/21/2022  -     Sedimentation Rate; Future; Expected date: 02/21/2022  -     C-Reactive Protein; Future; Expected date: 02/21/2022  -     methylPREDNISolone  (MEDROL) 4 MG Tab; Take 2 tablets (8 mg total) by mouth once daily.  Dispense: 60 tablet; Refill: 6  -     modafiniL (PROVIGIL) 200 MG Tab; Take 1 tablet (200 mg total) by mouth once daily.  Dispense: 30 tablet; Refill: 3  -     ibuprofen (ADVIL,MOTRIN) 800 MG tablet; TAKE 1 TABLET BY MOUTH EVERY 6 TO 8 HOURS FOR PAIN WITH FOOD OR MILK.  Dispense: 90 tablet; Refill: 3    Seronegative rheumatoid arthritis  -     methylPREDNISolone acetate injection 160 mg  -     ketorolac injection 60 mg  -     cyanocobalamin injection 1,000 mcg  -     CBC Auto Differential; Future; Expected date: 02/21/2022  -     Comprehensive Metabolic Panel; Future; Expected date: 02/21/2022  -     SIENA Screen w/Reflex; Future; Expected date: 02/21/2022  -     Sjogrens syndrome-A extractable nuclear antibody; Future; Expected date: 02/21/2022  -     Sjogrens syndrome-B extractable nuclear antibody; Future; Expected date: 02/21/2022  -     Sedimentation Rate; Future; Expected date: 02/21/2022  -     C-Reactive Protein; Future; Expected date: 02/21/2022  -     ibuprofen (ADVIL,MOTRIN) 800 MG tablet; TAKE 1 TABLET BY MOUTH EVERY 6 TO 8 HOURS FOR PAIN WITH FOOD OR MILK.  Dispense: 90 tablet; Refill: 3    Dorsalgia, unspecified  -     CBC Auto Differential; Future; Expected date: 02/21/2022  -     Comprehensive Metabolic Panel; Future; Expected date: 02/21/2022  -     SIENA Screen w/Reflex; Future; Expected date: 02/21/2022  -     Sjogrens syndrome-A extractable nuclear antibody; Future; Expected date: 02/21/2022  -     Sjogrens syndrome-B extractable nuclear antibody; Future; Expected date: 02/21/2022  -     Sedimentation Rate; Future; Expected date: 02/21/2022  -     C-Reactive Protein; Future; Expected date: 02/21/2022  -     ibuprofen (ADVIL,MOTRIN) 800 MG tablet; TAKE 1 TABLET BY MOUTH EVERY 6 TO 8 HOURS FOR PAIN WITH FOOD OR MILK.  Dispense: 90 tablet; Refill: 3    Leg heaviness  -     methylPREDNISolone acetate injection 160 mg  -     ketorolac  injection 60 mg  -     cyanocobalamin injection 1,000 mcg    Abnormal SPEP    Follicular carcinoma of thyroid  -     methylPREDNISolone acetate injection 160 mg  -     ketorolac injection 60 mg  -     cyanocobalamin injection 1,000 mcg  -     modafiniL (PROVIGIL) 200 MG Tab; Take 1 tablet (200 mg total) by mouth once daily.  Dispense: 30 tablet; Refill: 3    Thrombocytopenia  -     methylPREDNISolone acetate injection 160 mg  -     ketorolac injection 60 mg  -     cyanocobalamin injection 1,000 mcg  -     modafiniL (PROVIGIL) 200 MG Tab; Take 1 tablet (200 mg total) by mouth once daily.  Dispense: 30 tablet; Refill: 3    Chronic ITP (idiopathic thrombocytopenia)  -     CBC Auto Differential; Future; Expected date: 02/21/2022  -     Comprehensive Metabolic Panel; Future; Expected date: 02/21/2022  -     SIENA Screen w/Reflex; Future; Expected date: 02/21/2022  -     Sjogrens syndrome-A extractable nuclear antibody; Future; Expected date: 02/21/2022  -     Sjogrens syndrome-B extractable nuclear antibody; Future; Expected date: 02/21/2022  -     Sedimentation Rate; Future; Expected date: 02/21/2022  -     C-Reactive Protein; Future; Expected date: 02/21/2022  -     modafiniL (PROVIGIL) 200 MG Tab; Take 1 tablet (200 mg total) by mouth once daily.  Dispense: 30 tablet; Refill: 3    Shift work sleep disorder  -     armodafiniL (NUVIGIL) 250 mg tablet; Take 1 tablet (250 mg total) by mouth once daily.  Dispense: 30 tablet; Refill: 3  -     modafiniL (PROVIGIL) 200 MG Tab; Take 1 tablet (200 mg total) by mouth once daily.  Dispense: 30 tablet; Refill: 3    Idiopathic progressive neuropathy  -     modafiniL (PROVIGIL) 200 MG Tab; Take 1 tablet (200 mg total) by mouth once daily.  Dispense: 30 tablet; Refill: 3    Hypothyroidism, unspecified type  -     modafiniL (PROVIGIL) 200 MG Tab; Take 1 tablet (200 mg total) by mouth once daily.  Dispense: 30 tablet; Refill: 3        Assessment:  56 year old female with  Sjogren's  syndrome (+SSA, SIENA 1:640)  --chronic ITP, stable   --Hx of thyroid cancer 2007 s/p total thyroidectomy [facility] followed by Dr. Mansfield in [place]  --elevated CK  --hyperglycemia, HgbA1c 5.9%  --plaquenil (low platelet/bruising)    Plan:  1. toradol 60, depo 80, dexa 4, b12 today  2. Check immunofixation to follow up abnormal SPEP  3. X-ray lumbar spine consider MRI and epidural injections  4. Cont medrol 4 mg on occasion 6 mg PRN, evoxac PRN, arthrotec  5. Provigil

## 2022-03-10 ENCOUNTER — PATIENT MESSAGE (OUTPATIENT)
Dept: RHEUMATOLOGY | Facility: CLINIC | Age: 57
End: 2022-03-10
Payer: COMMERCIAL

## 2022-04-26 DIAGNOSIS — M35.00 SJOGREN'S SYNDROME, WITH UNSPECIFIED ORGAN INVOLVEMENT: ICD-10-CM

## 2022-04-26 RX ORDER — TIZANIDINE 4 MG/1
4 TABLET ORAL EVERY 8 HOURS PRN
Qty: 90 TABLET | Refills: 5 | Status: SHIPPED | OUTPATIENT
Start: 2022-04-26 | End: 2023-10-30 | Stop reason: SDUPTHER

## 2022-04-26 NOTE — TELEPHONE ENCOUNTER
Pharmacy requesting refill on Tizanidine 4mg  Pt's LOV 02/21/2022  Pt's NOV 10/17/2022  Medication pending

## 2022-06-16 ENCOUNTER — OFFICE VISIT (OUTPATIENT)
Dept: RHEUMATOLOGY | Facility: CLINIC | Age: 57
End: 2022-06-16
Payer: COMMERCIAL

## 2022-06-16 VITALS
HEIGHT: 67 IN | DIASTOLIC BLOOD PRESSURE: 79 MMHG | WEIGHT: 240 LBS | HEART RATE: 73 BPM | SYSTOLIC BLOOD PRESSURE: 123 MMHG | BODY MASS INDEX: 37.67 KG/M2

## 2022-06-16 DIAGNOSIS — D69.6 THROMBOCYTOPENIA: ICD-10-CM

## 2022-06-16 DIAGNOSIS — G89.4 CHRONIC PAIN SYNDROME: ICD-10-CM

## 2022-06-16 DIAGNOSIS — M35.00 SJOGREN'S SYNDROME, WITH UNSPECIFIED ORGAN INVOLVEMENT: Primary | ICD-10-CM

## 2022-06-16 DIAGNOSIS — B02.29 POST HERPETIC NEURALGIA: ICD-10-CM

## 2022-06-16 DIAGNOSIS — M35.00 SJOGREN'S SYNDROME, WITH UNSPECIFIED ORGAN INVOLVEMENT: ICD-10-CM

## 2022-06-16 DIAGNOSIS — M06.00 SERONEGATIVE RHEUMATOID ARTHRITIS: ICD-10-CM

## 2022-06-16 DIAGNOSIS — M06.00 SERONEGATIVE RHEUMATOID ARTHRITIS: Primary | ICD-10-CM

## 2022-06-16 PROCEDURE — 3008F BODY MASS INDEX DOCD: CPT | Mod: CPTII,S$GLB,, | Performed by: PHYSICIAN ASSISTANT

## 2022-06-16 PROCEDURE — 1159F PR MEDICATION LIST DOCUMENTED IN MEDICAL RECORD: ICD-10-PCS | Mod: CPTII,S$GLB,, | Performed by: PHYSICIAN ASSISTANT

## 2022-06-16 PROCEDURE — 96372 PR INJECTION,THERAP/PROPH/DIAG2ST, IM OR SUBCUT: ICD-10-PCS | Mod: S$GLB,,, | Performed by: PHYSICIAN ASSISTANT

## 2022-06-16 PROCEDURE — 3008F PR BODY MASS INDEX (BMI) DOCUMENTED: ICD-10-PCS | Mod: CPTII,S$GLB,, | Performed by: PHYSICIAN ASSISTANT

## 2022-06-16 PROCEDURE — 99999 PR PBB SHADOW E&M-EST. PATIENT-LVL V: ICD-10-PCS | Mod: PBBFAC,,, | Performed by: PHYSICIAN ASSISTANT

## 2022-06-16 PROCEDURE — 1159F MED LIST DOCD IN RCRD: CPT | Mod: CPTII,S$GLB,, | Performed by: PHYSICIAN ASSISTANT

## 2022-06-16 PROCEDURE — 99214 PR OFFICE/OUTPT VISIT, EST, LEVL IV, 30-39 MIN: ICD-10-PCS | Mod: 25,S$GLB,, | Performed by: PHYSICIAN ASSISTANT

## 2022-06-16 PROCEDURE — 99214 OFFICE O/P EST MOD 30 MIN: CPT | Mod: 25,S$GLB,, | Performed by: PHYSICIAN ASSISTANT

## 2022-06-16 PROCEDURE — 1160F RVW MEDS BY RX/DR IN RCRD: CPT | Mod: CPTII,S$GLB,, | Performed by: PHYSICIAN ASSISTANT

## 2022-06-16 PROCEDURE — 1160F PR REVIEW ALL MEDS BY PRESCRIBER/CLIN PHARMACIST DOCUMENTED: ICD-10-PCS | Mod: CPTII,S$GLB,, | Performed by: PHYSICIAN ASSISTANT

## 2022-06-16 PROCEDURE — 99999 PR PBB SHADOW E&M-EST. PATIENT-LVL V: CPT | Mod: PBBFAC,,, | Performed by: PHYSICIAN ASSISTANT

## 2022-06-16 PROCEDURE — 96372 THER/PROPH/DIAG INJ SC/IM: CPT | Mod: S$GLB,,, | Performed by: PHYSICIAN ASSISTANT

## 2022-06-16 RX ORDER — METHYLPREDNISOLONE 4 MG/1
8 TABLET ORAL DAILY
Qty: 60 TABLET | Refills: 6 | Status: SHIPPED | OUTPATIENT
Start: 2022-06-16 | End: 2023-05-29 | Stop reason: SDUPTHER

## 2022-06-16 RX ORDER — KETOROLAC TROMETHAMINE 30 MG/ML
60 INJECTION, SOLUTION INTRAMUSCULAR; INTRAVENOUS
Status: COMPLETED | OUTPATIENT
Start: 2022-06-16 | End: 2022-06-16

## 2022-06-16 RX ORDER — METHYLPREDNISOLONE ACETATE 80 MG/ML
160 INJECTION, SUSPENSION INTRA-ARTICULAR; INTRALESIONAL; INTRAMUSCULAR; SOFT TISSUE
Status: COMPLETED | OUTPATIENT
Start: 2022-06-16 | End: 2022-06-16

## 2022-06-16 RX ORDER — CYANOCOBALAMIN 1000 UG/ML
1000 INJECTION, SOLUTION INTRAMUSCULAR; SUBCUTANEOUS
Status: COMPLETED | OUTPATIENT
Start: 2022-06-16 | End: 2022-06-16

## 2022-06-16 RX ORDER — PANTOPRAZOLE SODIUM 40 MG/1
40 TABLET, DELAYED RELEASE ORAL DAILY
COMMUNITY
Start: 2022-03-14

## 2022-06-16 RX ORDER — LIDOCAINE 50 MG/G
1 PATCH TOPICAL DAILY
Qty: 30 PATCH | Refills: 3 | Status: SHIPPED | OUTPATIENT
Start: 2022-06-16

## 2022-06-16 RX ORDER — CEVIMELINE HYDROCHLORIDE 30 MG/1
30 CAPSULE ORAL 3 TIMES DAILY
Qty: 270 CAPSULE | Refills: 3 | Status: SHIPPED | OUTPATIENT
Start: 2022-06-16 | End: 2023-06-26 | Stop reason: SDUPTHER

## 2022-06-16 RX ORDER — NORETHINDRONE ACETATE AND ETHINYL ESTRADIOL 1; 5 MG/1; UG/1
TABLET ORAL
COMMUNITY
Start: 2022-05-08

## 2022-06-16 RX ADMIN — CYANOCOBALAMIN 1000 MCG: 1000 INJECTION, SOLUTION INTRAMUSCULAR; SUBCUTANEOUS at 04:06

## 2022-06-16 RX ADMIN — METHYLPREDNISOLONE ACETATE 160 MG: 80 INJECTION, SUSPENSION INTRA-ARTICULAR; INTRALESIONAL; INTRAMUSCULAR; SOFT TISSUE at 04:06

## 2022-06-16 RX ADMIN — KETOROLAC TROMETHAMINE 60 MG: 30 INJECTION, SOLUTION INTRAMUSCULAR; INTRAVENOUS at 04:06

## 2022-06-16 ASSESSMENT — ROUTINE ASSESSMENT OF PATIENT INDEX DATA (RAPID3)
FATIGUE SCORE: 1.1
MDHAQ FUNCTION SCORE: 1.5
PSYCHOLOGICAL DISTRESS SCORE: 2.2
PATIENT GLOBAL ASSESSMENT SCORE: 6
PAIN SCORE: 7.5
TOTAL RAPID3 SCORE: 6.17

## 2022-06-16 NOTE — PROGRESS NOTES
Med and dose verified, 2 pt ID used, allergies reviewed, patient tolerated well. See MAR for med, doses, and injection sites. Lula LARSON LPN

## 2022-06-16 NOTE — PROGRESS NOTES
Subjective:       Patient ID: Mack Melton is a 57 y.o. female.    Chief Complaint: Disease Management    Mrs. Melton is a 56 year old female who presents to clinic for follow up on Sjogren's syndrome. She is doing fair overall. She complains of joint pain involving her hands and ankles with morning stiffness lasting between 30min and 1 hour. Plaquenil is on hold due to low platelet count and bruising. She has increasing stiffness and pain/heaviness in her legs and cramping in her feet. She will have time getting up after periods of immobility. Medrol helps significantly with her arthritis--when she d/[place]os[place] this medication she has a significantly difficulty time moving. She admits to worsening low back pain. She can not tolerate gabapentin. She has weakness in her legs at times when walking. She describes legs as heavy. X-ray of lumbar spine showed severe degenerative disc disease. She is requesting a medication to help with her severe pain.     Fatigue is unchanged--she has shift work sleep disorder--working nights. She takes provigil and adderall during the day. Ambien 2.5 mg at her bedtime prn.     Sicca sx are improved with evoxac.    We reviewed her recent labs.     Current tx:  1. Ibuprofen   2. Medrol 4 mg  3. Zanaflex  4. Provigil    Review of Systems   Constitutional: Positive for activity change and fatigue. Negative for appetite change, chills and fever.   HENT:        Dry mouth   Eyes: Negative for visual disturbance.        Dry eyes   Respiratory: Negative for cough and shortness of breath.    Cardiovascular: Negative for chest pain, palpitations and leg swelling.   Gastrointestinal: Negative for abdominal pain, constipation, diarrhea, nausea and vomiting.   Musculoskeletal: Positive for arthralgias, back pain, joint swelling, neck pain and neck stiffness.   Neurological: Negative for dizziness, weakness, light-headedness and headaches.         Objective:     Vitals:    06/16/22 1500   BP: 123/79   Pulse:  73       Past Medical History:   Diagnosis Date    Acid reflux     Arthritis     History of colon polyps     Hypertension     Sjogren's syndrome     Sleep apnea     not using CPAP    Thyroid cancer      Past Surgical History:   Procedure Laterality Date    COLONOSCOPY N/A 12/9/2020    Procedure: COLONOSCOPY;  Surgeon: Tonny Drew MD;  Location: [facility];  Service: Endoscopy;  Laterality: N/A;    THYROID SURGERY      UTERINE FIBROID SURGERY            Physical Exam   Eyes: Right conjunctiva is not injected. Left conjunctiva is not injected.   Neck: No JVD present. No thyromegaly present.   Cardiovascular: Normal rate and regular rhythm. Exam reveals no decreased pulses.   Pulmonary/Chest: She has no wheezes. She has no rhonchi. She has no rales.   Musculoskeletal:      Right shoulder: Normal.      Left shoulder: Normal.      Right elbow: Normal.      Left elbow: Normal.      Right wrist: Normal.      Left wrist: Normal.      Right knee: Normal.      Left knee: Normal.   Lymphadenopathy:     She has no cervical adenopathy.   Neurological: Gait normal.   Skin: No rash noted.   Psychiatric: Mood and affect normal. Her mood appears not anxious.       Right Side Rheumatological Exam     Examination finds the shoulder, elbow, wrist, knee and 1st PIP normal.    The patient is tender to palpation of the 1st MCP, 2nd PIP, 2nd MCP, 3rd PIP, 3rd MCP, 4th PIP, 4th MCP, 5th PIP and 5th MCP    She has swelling of the 1st MCP, 2nd MCP, 3rd MCP, 4th MCP and 5th MCP    Left Side Rheumatological Exam     Examination finds the shoulder, elbow, wrist, knee, 1st PIP, 2nd PIP, 3rd PIP, 4th PIP and 5th PIP normal.    The patient is tender to palpation of the 1st MCP, 2nd MCP, 3rd MCP, 4th MCP and 5th MCP.      Back/Neck Exam   Tenderness Right paramedian tenderness of the Upper L-Spine, Lower L-Spine and SI Joint.Left paramedian tenderness of the Upper L-Spine, Lower L-Spine and SI Joint.         Labs due now    Assessment:        1. Sjogren's syndrome, with unspecified organ involvement    2. Seronegative rheumatoid arthritis    3. Post herpetic neuralgia    4. Thrombocytopenia            Plan:       Sjogren's syndrome, with unspecified organ involvement  -     ketorolac injection 60 mg  -     methylPREDNISolone acetate injection 160 mg  -     cyanocobalamin injection 1,000 mcg  -     CBC Auto Differential; Future; Expected date: 06/16/2022  -     Comprehensive Metabolic Panel; Future; Expected date: 06/16/2022  -     C-Reactive Protein; Future; Expected date: 06/16/2022  -     Sedimentation Rate; Future; Expected date: 06/16/2022  -     Sjogrens syndrome-A extractable nuclear antibody; Future; Expected date: 06/16/2022  -     Sjogrens syndrome-B extractable nuclear antibody; Future; Expected date: 06/16/2022  -     SIENA Screen w/Reflex; Future; Expected date: 06/16/2022  -     Protein/Creatinine Ratio, Urine; Future; Expected date: 06/16/2022  -     Urinalysis; Future; Expected date: 06/16/2022  -     cevimeline (EVOXAC) 30 mg capsule; Take 1 capsule (30 mg total) by mouth 3 (three) times daily.  Dispense: 270 capsule; Refill: 3  -     methylPREDNISolone (MEDROL) 4 MG Tab; Take 2 tablets (8 mg total) by mouth once daily.  Dispense: 60 tablet; Refill: 6    Seronegative rheumatoid arthritis  -     ketorolac injection 60 mg  -     methylPREDNISolone acetate injection 160 mg  -     cyanocobalamin injection 1,000 mcg  -     CBC Auto Differential; Future; Expected date: 06/16/2022  -     Comprehensive Metabolic Panel; Future; Expected date: 06/16/2022  -     C-Reactive Protein; Future; Expected date: 06/16/2022  -     Sedimentation Rate; Future; Expected date: 06/16/2022  -     Sjogrens syndrome-A extractable nuclear antibody; Future; Expected date: 06/16/2022  -     Sjogrens syndrome-B extractable nuclear antibody; Future; Expected date: 06/16/2022  -     SIENA Screen w/Reflex; Future; Expected date: 06/16/2022  -     Protein/Creatinine  Ratio, Urine; Future; Expected date: 06/16/2022  -     Urinalysis; Future; Expected date: 06/16/2022  -     cevimeline (EVOXAC) 30 mg capsule; Take 1 capsule (30 mg total) by mouth 3 (three) times daily.  Dispense: 270 capsule; Refill: 3    Post herpetic neuralgia  -     LIDOcaine (LIDODERM) 5 %; Place 1 patch onto the skin once daily. Remove & Discard patch within 12 hours or as directed by MD  Dispense: 30 patch; Refill: 3    Thrombocytopenia        Assessment:  57 year old female with  Sjogren's syndrome (+SSA, SIENA 1:640)  --chronic ITP, stable   --Hx of thyroid cancer 2007 s/p total thyroidectomy [facility] followed by Dr. Mansfield in   --elevated CK  --hyperglycemia, HgbA1c 5.9%  --plaquenil (low platelet/bruising)    Plan:  1. toradol 60, depo 160, b12 today  2. Cont medrol PRN, evoxac prn  3. Cont ibuprofen prn  4. Lidoderm refill  5. Check labs  6. Referral to pain management for severe degenerative disc disease  7. Tramadol pended for Dr. Harding. Pain contract signed.  I have checked [place] prescription monitoring program site and no unusual or abnormal behavior has occurred pt understand the risk and benefits of taking opioid medications and has decided to continue the medication.  8. Consider MTX in the future?  9. Covid booster discussed      Follow up:  4 mo Dr. Harding w/labs prior

## 2022-06-19 RX ORDER — TRAMADOL HYDROCHLORIDE 50 MG/1
50 TABLET ORAL EVERY 8 HOURS PRN
Qty: 90 TABLET | Refills: 3 | Status: SHIPPED | OUTPATIENT
Start: 2022-06-19 | End: 2022-10-17 | Stop reason: SDUPTHER

## 2022-08-05 LAB
ALBUMIN SERPL-MCNC: 3.9 G/DL (ref 3.6–5.1)
ALBUMIN/GLOB SERPL: 1.2 (CALC) (ref 1–2.5)
ALP SERPL-CCNC: 52 U/L (ref 37–153)
ALT SERPL-CCNC: 17 U/L (ref 6–29)
ANA PAT SER IF-IMP: ABNORMAL
ANA PAT SER IF-IMP: ABNORMAL
ANA SER QL IF: POSITIVE
ANA TITR SER IF: ABNORMAL TITER
ANA TITR SER IF: ABNORMAL TITER
APPEARANCE UR: CLEAR
AST SERPL-CCNC: 14 U/L (ref 10–35)
BASOPHILS # BLD AUTO: 29 CELLS/UL (ref 0–200)
BASOPHILS NFR BLD AUTO: 0.3 %
BILIRUB SERPL-MCNC: 0.3 MG/DL (ref 0.2–1.2)
BILIRUB UR QL STRIP: NEGATIVE
BUN SERPL-MCNC: 26 MG/DL (ref 7–25)
BUN/CREAT SERPL: 24 (CALC) (ref 6–22)
CALCIUM SERPL-MCNC: 8.7 MG/DL (ref 8.6–10.4)
CHLORIDE SERPL-SCNC: 106 MMOL/L (ref 98–110)
CO2 SERPL-SCNC: 25 MMOL/L (ref 20–32)
COLOR UR: YELLOW
CREAT SERPL-MCNC: 1.09 MG/DL (ref 0.5–1.03)
CREAT UR-MCNC: 218 MG/DL (ref 20–275)
CRP SERPL-MCNC: 2 MG/L
EGFR: 59 ML/MIN/1.73M2
ENA SS-A AB SER IA-ACNC: ABNORMAL AI
ENA SS-B AB SER IA-ACNC: NORMAL AI
EOSINOPHIL # BLD AUTO: 245 CELLS/UL (ref 15–500)
EOSINOPHIL NFR BLD AUTO: 2.5 %
ERYTHROCYTE [DISTWIDTH] IN BLOOD BY AUTOMATED COUNT: 13.3 % (ref 11–15)
ERYTHROCYTE [SEDIMENTATION RATE] IN BLOOD BY WESTERGREN METHOD: 6 MM/H
GLOBULIN SER CALC-MCNC: 3.2 G/DL (CALC) (ref 1.9–3.7)
GLUCOSE SERPL-MCNC: 90 MG/DL (ref 65–99)
GLUCOSE UR QL STRIP: NEGATIVE
HCT VFR BLD AUTO: 40.8 % (ref 35–45)
HGB BLD-MCNC: 12.8 G/DL (ref 11.7–15.5)
HGB UR QL STRIP: NEGATIVE
KETONES UR QL STRIP: ABNORMAL
LEUKOCYTE ESTERASE UR QL STRIP: NEGATIVE
LYMPHOCYTES # BLD AUTO: 2097 CELLS/UL (ref 850–3900)
LYMPHOCYTES NFR BLD AUTO: 21.4 %
MCH RBC QN AUTO: 27.7 PG (ref 27–33)
MCHC RBC AUTO-ENTMCNC: 31.4 G/DL (ref 32–36)
MCV RBC AUTO: 88.3 FL (ref 80–100)
MONOCYTES # BLD AUTO: 794 CELLS/UL (ref 200–950)
MONOCYTES NFR BLD AUTO: 8.1 %
NEUTROPHILS # BLD AUTO: 6635 CELLS/UL (ref 1500–7800)
NEUTROPHILS NFR BLD AUTO: 67.7 %
NITRITE UR QL STRIP: NEGATIVE
PH UR STRIP: 6 [PH] (ref 5–8)
PLATELET # BLD AUTO: 142 THOUSAND/UL (ref 140–400)
PMV BLD REES-ECKER: 12.6 FL (ref 7.5–12.5)
POTASSIUM SERPL-SCNC: 3.9 MMOL/L (ref 3.5–5.3)
PROT SERPL-MCNC: 7.1 G/DL (ref 6.1–8.1)
PROT UR QL STRIP: ABNORMAL
PROT UR-MCNC: 54 MG/DL (ref 5–24)
PROT/CREAT UR: 0.25 MG/MG CREAT (ref 0.02–0.16)
PROT/CREAT UR: 248 MG/G CREAT (ref 21–161)
RBC # BLD AUTO: 4.62 MILLION/UL (ref 3.8–5.1)
SODIUM SERPL-SCNC: 141 MMOL/L (ref 135–146)
SP GR UR STRIP: 1.03 (ref 1–1.03)
WBC # BLD AUTO: 9.8 THOUSAND/UL (ref 3.8–10.8)

## 2022-10-17 ENCOUNTER — OFFICE VISIT (OUTPATIENT)
Dept: RHEUMATOLOGY | Facility: CLINIC | Age: 57
End: 2022-10-17
Payer: COMMERCIAL

## 2022-10-17 VITALS
WEIGHT: 251.31 LBS | BODY MASS INDEX: 39.44 KG/M2 | SYSTOLIC BLOOD PRESSURE: 142 MMHG | DIASTOLIC BLOOD PRESSURE: 70 MMHG | HEIGHT: 67 IN | HEART RATE: 75 BPM

## 2022-10-17 DIAGNOSIS — R77.8 ABNORMAL SPEP: ICD-10-CM

## 2022-10-17 DIAGNOSIS — G89.4 CHRONIC PAIN SYNDROME: ICD-10-CM

## 2022-10-17 DIAGNOSIS — M35.00 SJOGREN'S SYNDROME, WITH UNSPECIFIED ORGAN INVOLVEMENT: ICD-10-CM

## 2022-10-17 DIAGNOSIS — D69.3 CHRONIC ITP (IDIOPATHIC THROMBOCYTOPENIA): ICD-10-CM

## 2022-10-17 DIAGNOSIS — M06.00 SERONEGATIVE RHEUMATOID ARTHRITIS: ICD-10-CM

## 2022-10-17 DIAGNOSIS — M75.51 ACUTE SHOULDER BURSITIS, RIGHT: ICD-10-CM

## 2022-10-17 DIAGNOSIS — L40.50 PSA (PSORIATIC ARTHRITIS): Primary | ICD-10-CM

## 2022-10-17 DIAGNOSIS — M51.36 DDD (DEGENERATIVE DISC DISEASE), LUMBAR: ICD-10-CM

## 2022-10-17 DIAGNOSIS — M17.12 PRIMARY OSTEOARTHRITIS OF LEFT KNEE: ICD-10-CM

## 2022-10-17 DIAGNOSIS — F51.01 PRIMARY INSOMNIA: ICD-10-CM

## 2022-10-17 PROCEDURE — 99215 PR OFFICE/OUTPT VISIT, EST, LEVL V, 40-54 MIN: ICD-10-PCS | Mod: 25,S$GLB,, | Performed by: INTERNAL MEDICINE

## 2022-10-17 PROCEDURE — 3078F PR MOST RECENT DIASTOLIC BLOOD PRESSURE < 80 MM HG: ICD-10-PCS | Mod: CPTII,S$GLB,, | Performed by: INTERNAL MEDICINE

## 2022-10-17 PROCEDURE — 96372 THER/PROPH/DIAG INJ SC/IM: CPT | Mod: S$GLB,,, | Performed by: INTERNAL MEDICINE

## 2022-10-17 PROCEDURE — 3077F SYST BP >= 140 MM HG: CPT | Mod: CPTII,S$GLB,, | Performed by: INTERNAL MEDICINE

## 2022-10-17 PROCEDURE — 3077F PR MOST RECENT SYSTOLIC BLOOD PRESSURE >= 140 MM HG: ICD-10-PCS | Mod: CPTII,S$GLB,, | Performed by: INTERNAL MEDICINE

## 2022-10-17 PROCEDURE — 99215 OFFICE O/P EST HI 40 MIN: CPT | Mod: 25,S$GLB,, | Performed by: INTERNAL MEDICINE

## 2022-10-17 PROCEDURE — 99999 PR PBB SHADOW E&M-EST. PATIENT-LVL V: ICD-10-PCS | Mod: PBBFAC,,, | Performed by: INTERNAL MEDICINE

## 2022-10-17 PROCEDURE — 96372 PR INJECTION,THERAP/PROPH/DIAG2ST, IM OR SUBCUT: ICD-10-PCS | Mod: S$GLB,,, | Performed by: INTERNAL MEDICINE

## 2022-10-17 PROCEDURE — 1159F MED LIST DOCD IN RCRD: CPT | Mod: CPTII,S$GLB,, | Performed by: INTERNAL MEDICINE

## 2022-10-17 PROCEDURE — 3078F DIAST BP <80 MM HG: CPT | Mod: CPTII,S$GLB,, | Performed by: INTERNAL MEDICINE

## 2022-10-17 PROCEDURE — 99999 PR PBB SHADOW E&M-EST. PATIENT-LVL V: CPT | Mod: PBBFAC,,, | Performed by: INTERNAL MEDICINE

## 2022-10-17 PROCEDURE — 1159F PR MEDICATION LIST DOCUMENTED IN MEDICAL RECORD: ICD-10-PCS | Mod: CPTII,S$GLB,, | Performed by: INTERNAL MEDICINE

## 2022-10-17 RX ORDER — TRAMADOL HYDROCHLORIDE 50 MG/1
50 TABLET ORAL EVERY 8 HOURS PRN
Qty: 90 TABLET | Refills: 5 | Status: SHIPPED | OUTPATIENT
Start: 2022-10-17 | End: 2023-10-30

## 2022-10-17 RX ORDER — BUPROPION HYDROCHLORIDE 150 MG/1
150 TABLET ORAL EVERY MORNING
COMMUNITY
Start: 2022-09-27

## 2022-10-17 RX ORDER — ZOLPIDEM TARTRATE 5 MG/1
5 TABLET ORAL NIGHTLY PRN
Qty: 30 TABLET | Refills: 5 | Status: SHIPPED | OUTPATIENT
Start: 2022-10-17

## 2022-10-17 RX ORDER — CYANOCOBALAMIN 1000 UG/ML
1000 INJECTION, SOLUTION INTRAMUSCULAR; SUBCUTANEOUS
Status: COMPLETED | OUTPATIENT
Start: 2022-10-17 | End: 2022-10-17

## 2022-10-17 RX ORDER — KETOROLAC TROMETHAMINE 30 MG/ML
60 INJECTION, SOLUTION INTRAMUSCULAR; INTRAVENOUS
Status: COMPLETED | OUTPATIENT
Start: 2022-10-17 | End: 2022-10-17

## 2022-10-17 RX ORDER — METHYLPREDNISOLONE ACETATE 80 MG/ML
80 INJECTION, SUSPENSION INTRA-ARTICULAR; INTRALESIONAL; INTRAMUSCULAR; SOFT TISSUE
Status: COMPLETED | OUTPATIENT
Start: 2022-10-17 | End: 2022-10-17

## 2022-10-17 RX ADMIN — CYANOCOBALAMIN 1000 MCG: 1000 INJECTION, SOLUTION INTRAMUSCULAR; SUBCUTANEOUS at 05:10

## 2022-10-17 RX ADMIN — METHYLPREDNISOLONE ACETATE 80 MG: 80 INJECTION, SUSPENSION INTRA-ARTICULAR; INTRALESIONAL; INTRAMUSCULAR; SOFT TISSUE at 05:10

## 2022-10-17 RX ADMIN — KETOROLAC TROMETHAMINE 60 MG: 30 INJECTION, SOLUTION INTRAMUSCULAR; INTRAVENOUS at 05:10

## 2022-10-17 ASSESSMENT — ROUTINE ASSESSMENT OF PATIENT INDEX DATA (RAPID3)
PSYCHOLOGICAL DISTRESS SCORE: 2.2
MDHAQ FUNCTION SCORE: 1.7
TOTAL RAPID3 SCORE: 7.55
PAIN SCORE: 8.5
FATIGUE SCORE: 2.2
PATIENT GLOBAL ASSESSMENT SCORE: 8.5

## 2022-10-17 NOTE — PROGRESS NOTES
2 pt identifiers used  Allergies reviewed      Administered 2 cc ( 30 mg/ml ) of toradol to the left upper outer gluteal. Patient tolerated injections well. Informed of s/s to report verbalized understanding. No adverse reactions noted. .      Administered 1 cc ( 80 mg/ml ) of depomedrol to the right upper outer gluteal. Informed of s/s to report verbalized understanding. No adverse reactions noted.      Administered 1 cc ( 1000 mcg/ml ) of b12 to the right ventrogluteal. Informed of s/s to report verbalized understanding. No adverse reactions noted.      Left facility in stable condition

## 2022-10-17 NOTE — PROGRESS NOTES
Subjective:       Patient ID: Mack Melton is a 57 y.o. female.    Chief Complaint: Disease Management    Follow up: 57 year old female who presents to clinic for follow up on Sjogren's syndrome. She is doing fair overall. She complains of joint pain involving her hands and ankles with morning stiffness lasting between 30min and 1 hour. Plaquenil is on hold due to low platelet count and bruising. She has increasing stiffness and pain/heaviness in her legs and cramping in her feet. She will have time getting up after periods of immobility. Medrol helps significantly with her arthritis--when she d/akosua this medication she has a significantly difficulty time moving. She admits to worsening low back pain. She can not tolerate gabapentin. She has weakness in her legs at times when walking. She describes legs as heavy. X-ray of lumbar spine showed severe degenerative disc disease. She is requesting a medication to help with her severe pain.     Fatigue is unchanged--she has shift work sleep disorder--working nights. She takes provigil and adderall during the day. Ambien 2.5 mg at her bedtime prn.     Sicca sx are improved with evoxac.    We reviewed her recent labs.     Current tx:  1. Ibuprofen   2. Medrol 4 mg  3. Zanaflex  4. Provigil    Review of Systems   Constitutional:  Positive for activity change and fatigue. Negative for appetite change and chills.   HENT:          Dry mouth   Eyes:  Negative for visual disturbance.        Dry eyes   Respiratory:  Negative for cough and shortness of breath.    Cardiovascular:  Negative for chest pain, palpitations and leg swelling.   Gastrointestinal:  Negative for abdominal pain, constipation, diarrhea, nausea and vomiting.   Musculoskeletal:  Positive for arthralgias, back pain, neck pain and neck stiffness.   Neurological:  Negative for dizziness, weakness and light-headedness.       Objective:     Vitals:    10/17/22 1537   BP: (!) 142/70   Pulse: 75       Past Medical  History:   Diagnosis Date    Acid reflux     Arthritis     History of colon polyps     Hypertension     Sjogren's syndrome     Sleep apnea     not using CPAP    Thyroid cancer      Past Surgical History:   Procedure Laterality Date    COLONOSCOPY N/A 12/9/2020    Procedure: COLONOSCOPY;  Surgeon: Tonny Drew MD;  Location: [facility];  Service: Endoscopy;  Laterality: N/A;    THYROID SURGERY      UTERINE FIBROID SURGERY            Physical Exam   Constitutional: She is oriented to person, place, and time.   HENT:   Head: Normocephalic and atraumatic.   Mouth/Throat: Oropharynx is clear and moist.   Eyes: Pupils are equal, round, and reactive to light. Right conjunctiva is not injected. Left conjunctiva is not injected.   Neck: No JVD present. No thyromegaly present.   Cardiovascular: Normal rate, regular rhythm and normal heart sounds. Exam reveals no gallop, no friction rub and no decreased pulses.   No murmur heard.  Pulmonary/Chest: Breath sounds normal. She has no wheezes. She has no rhonchi. She has no rales. She exhibits no tenderness.   Abdominal: There is no abdominal tenderness. There is no rebound and no guarding.   Musculoskeletal:         General: Tenderness and deformity present.      Right shoulder: Normal.      Left shoulder: Normal.      Right elbow: Normal.      Left elbow: Normal.      Right wrist: Normal.      Left wrist: Normal.      Cervical back: Neck supple.      Right knee: Normal.      Left knee: Normal.   Lymphadenopathy:     She has no cervical adenopathy.   Neurological: She is alert and oriented to person, place, and time. She has normal sensation. Gait normal.   Reflex Scores:       Patellar reflexes are 3+ on the right side and 3+ on the left side.  Skin: No rash noted. No erythema. No pallor.   Psychiatric: Mood and affect normal. Her mood appears not anxious.   Vitals reviewed.      Right Side Rheumatological Exam     Examination finds the shoulder, elbow, wrist, knee, 1st PIP, 1st  MCP, 2nd MCP, 3rd MCP, 4th MCP and 5th MCP normal.    The patient is tender to palpation of the 1st MCP, 2nd PIP, 2nd MCP, 3rd PIP, 3rd MCP, 4th PIP, 4th MCP, 5th PIP and 5th MCP    She has swelling of the 1st MCP, 2nd MCP, 3rd MCP, 4th MCP and 5th MCP    The patient has an enlarged wrist, 1st PIP, 2nd PIP, 3rd PIP, 4th PIP and 5th PIP    Shoulder Exam   Tenderness Location: acromion    Range of Motion   Active abduction:  abnormal   Adduction: abnormal  Sensation: normal    Knee Exam   Tenderness Location: medial joint line  Patellofemoral Crepitus: positive  Sensation: normal    Hip Exam   Tenderness Location: no tenderness  Sensation: normal    Elbow/Wrist Exam   Tenderness Location: no tenderness  Sensation: normal    Left Side Rheumatological Exam     Examination finds the shoulder, elbow, wrist, knee, 1st PIP, 1st MCP, 2nd PIP, 2nd MCP, 3rd PIP, 3rd MCP, 4th PIP, 4th MCP, 5th PIP and 5th MCP normal.    The patient is tender to palpation of the 1st MCP, 2nd MCP, 3rd MCP, 4th MCP and 5th MCP.    The patient has an enlarged wrist, 1st PIP, 2nd PIP, 3rd PIP, 4th PIP and 5th PIP.    Shoulder Exam   Tenderness Location: acromion    Range of Motion   Active abduction:  abnormal   Sensation: normal    Knee Exam   Tenderness Location: lateral joint line and medial joint line    Patellofemoral Crepitus: positive  Sensation: normal    Hip Exam   Tenderness Location: no tenderness  Sensation: normal    Elbow/Wrist Exam   Sensation: normal      Back/Neck Exam   General Inspection   Gait: normal       Tenderness Right paramedian tenderness of the Upper L-Spine, Lower L-Spine and SI Joint.Left paramedian tenderness of the Upper L-Spine, Lower L-Spine and SI Joint.     Labs due now    Assessment:       1. PSA (psoriatic arthritis)    2. Sjogren's syndrome, with unspecified organ involvement    3. Seronegative rheumatoid arthritis    4. Abnormal SPEP    5. Chronic ITP (idiopathic thrombocytopenia)    6. DDD (degenerative disc  disease), lumbar    7. Acute shoulder bursitis, right    8. Primary osteoarthritis of left knee    9. Chronic pain syndrome    10. Primary insomnia              Plan:       PSA (psoriatic arthritis)  -     X-ray Knee Ortho Bilateral with Flexion; Future; Expected date: 10/17/2022  -     X-Ray Sacrum And Coccyx; Future; Expected date: 10/17/2022  -     X-Ray Pelvis Complete min 3 views; Future; Expected date: 10/17/2022  -     ketorolac injection 60 mg  -     methylPREDNISolone acetate injection 80 mg  -     cyanocobalamin injection 1,000 mcg    Sjogren's syndrome, with unspecified organ involvement  -     X-ray Knee Ortho Bilateral with Flexion; Future; Expected date: 10/17/2022  -     X-Ray Sacrum And Coccyx; Future; Expected date: 10/17/2022  -     X-Ray Pelvis Complete min 3 views; Future; Expected date: 10/17/2022  -     ketorolac injection 60 mg  -     methylPREDNISolone acetate injection 80 mg  -     cyanocobalamin injection 1,000 mcg  -     traMADoL (ULTRAM) 50 mg tablet; Take 1 tablet (50 mg total) by mouth every 8 (eight) hours as needed for Pain.  Dispense: 90 tablet; Refill: 5    Seronegative rheumatoid arthritis  -     X-ray Knee Ortho Bilateral with Flexion; Future; Expected date: 10/17/2022  -     X-Ray Sacrum And Coccyx; Future; Expected date: 10/17/2022  -     X-Ray Pelvis Complete min 3 views; Future; Expected date: 10/17/2022  -     ketorolac injection 60 mg  -     methylPREDNISolone acetate injection 80 mg  -     cyanocobalamin injection 1,000 mcg  -     traMADoL (ULTRAM) 50 mg tablet; Take 1 tablet (50 mg total) by mouth every 8 (eight) hours as needed for Pain.  Dispense: 90 tablet; Refill: 5    Abnormal SPEP  -     X-ray Knee Ortho Bilateral with Flexion; Future; Expected date: 10/17/2022  -     X-Ray Sacrum And Coccyx; Future; Expected date: 10/17/2022  -     X-Ray Pelvis Complete min 3 views; Future; Expected date: 10/17/2022  -     ketorolac injection 60 mg  -     methylPREDNISolone acetate  injection 80 mg  -     cyanocobalamin injection 1,000 mcg    Chronic ITP (idiopathic thrombocytopenia)  -     X-ray Knee Ortho Bilateral with Flexion; Future; Expected date: 10/17/2022  -     X-Ray Sacrum And Coccyx; Future; Expected date: 10/17/2022  -     X-Ray Pelvis Complete min 3 views; Future; Expected date: 10/17/2022  -     ketorolac injection 60 mg  -     methylPREDNISolone acetate injection 80 mg  -     cyanocobalamin injection 1,000 mcg    DDD (degenerative disc disease), lumbar  -     X-ray Knee Ortho Bilateral with Flexion; Future; Expected date: 10/17/2022  -     X-Ray Sacrum And Coccyx; Future; Expected date: 10/17/2022  -     X-Ray Pelvis Complete min 3 views; Future; Expected date: 10/17/2022  -     ketorolac injection 60 mg  -     methylPREDNISolone acetate injection 80 mg  -     cyanocobalamin injection 1,000 mcg    Acute shoulder bursitis, right  -     X-ray Knee Ortho Bilateral with Flexion; Future; Expected date: 10/17/2022  -     X-Ray Sacrum And Coccyx; Future; Expected date: 10/17/2022  -     X-Ray Pelvis Complete min 3 views; Future; Expected date: 10/17/2022  -     ketorolac injection 60 mg  -     methylPREDNISolone acetate injection 80 mg  -     cyanocobalamin injection 1,000 mcg    Primary osteoarthritis of left knee  -     X-ray Knee Ortho Bilateral with Flexion; Future; Expected date: 10/17/2022  -     X-Ray Sacrum And Coccyx; Future; Expected date: 10/17/2022  -     X-Ray Pelvis Complete min 3 views; Future; Expected date: 10/17/2022  -     ketorolac injection 60 mg  -     methylPREDNISolone acetate injection 80 mg  -     cyanocobalamin injection 1,000 mcg    Chronic pain syndrome  -     traMADoL (ULTRAM) 50 mg tablet; Take 1 tablet (50 mg total) by mouth every 8 (eight) hours as needed for Pain.  Dispense: 90 tablet; Refill: 5    Primary insomnia  -     zolpidem (AMBIEN) 5 MG Tab; Take 1 tablet (5 mg total) by mouth nightly as needed (insomnia).  Dispense: 30 tablet; Refill: 5         Assessment:  57 year old female with  Sjogren's syndrome (+SSA, SIENA 1:640)  --chronic ITP, stable   --Hx of thyroid cancer 2007 s/p total thyroidectomy [facility] followed by Dr. Mansfield in [place]  --elevated CK  --hyperglycemia, HgbA1c 5.9%  --plaquenil (low platelet/bruising)    Plan:  1. toradol 60, depo 80, b12 today  2. Cont medrol PRN, evoxac prn  3. Cont ibuprofen prn  4. Lidoderm refill  5. Add otezla  6. Referral to pain management for severe degenerative disc disease  7. Tramadol pended  Pain contract signed.  I have checked [place] prescription monitoring program site and no unusual or abnormal behavior has occurred pt understand the risk and benefits of taking opioid medications and has decided to continue the medication.

## 2022-11-01 DIAGNOSIS — L40.50 PSA (PSORIATIC ARTHRITIS): ICD-10-CM

## 2022-11-01 DIAGNOSIS — L40.50 PSA (PSORIATIC ARTHRITIS): Primary | ICD-10-CM

## 2022-11-01 RX ORDER — APREMILAST 30 MG/1
30 TABLET, FILM COATED ORAL 2 TIMES DAILY
Qty: 60 TABLET | Refills: 11 | Status: SHIPPED | OUTPATIENT
Start: 2022-11-01 | End: 2022-11-02 | Stop reason: SDUPTHER

## 2022-11-01 NOTE — TELEPHONE ENCOUNTER
----- Message from Negro Lebron sent at 11/1/2022 12:57 PM CDT -----  Type:  RX Refill Request    Who Called:    Refill or New Rx:  New  RX Name and Strength:  Otezla 30 MG-- Not on file  How is the patient currently taking it? (ex. 1XDay):  2XDay  Is this a 30 day or 90 day RX:  30    Preferred Pharmacy with phone number:    [id number] - [place], [place] - [address] [address]  Phone: [phone] Fax: [phone]      Local or Mail Order:  Local  Ordering Provider:  Meaghan Gilbert Call Back Number:  [phone]  Additional Information:

## 2022-11-06 RX ORDER — APREMILAST 30 MG/1
30 TABLET, FILM COATED ORAL 2 TIMES DAILY
Qty: 60 TABLET | Refills: 11 | Status: SHIPPED | OUTPATIENT
Start: 2022-11-06 | End: 2023-04-11 | Stop reason: SDUPTHER

## 2022-11-08 ENCOUNTER — SPECIALTY PHARMACY (OUTPATIENT)
Dept: PHARMACY | Facility: CLINIC | Age: 57
End: 2022-11-08
Payer: COMMERCIAL

## 2022-11-08 NOTE — TELEPHONE ENCOUNTER
Hello, this is Nieves Covington with [facility].  We are working on your prescription that your doctor has sent us. We will be working with your insurance to get this approved for you. We will be calling you along the way with updates on your medication.  If you have any questions, you can reach us at [phone].    Welcome call outcome: Patient/caregiver reached  Patient confirmed she has taken the Otezla starter pack via samples and is now on the 30 mg BID dose.

## 2022-11-08 NOTE — TELEPHONE ENCOUNTER
Tyrone JENKINS submitted.  [facility]: [id number]     Tyrone JENKINS approved from 10/9/22 to 5/7/23.  Case ID: [id number]

## 2022-11-09 ENCOUNTER — SPECIALTY PHARMACY (OUTPATIENT)
Dept: PHARMACY | Facility: CLINIC | Age: 57
End: 2022-11-09
Payer: COMMERCIAL

## 2022-11-09 ENCOUNTER — HOSPITAL ENCOUNTER (OUTPATIENT)
Dept: RADIOLOGY | Facility: HOSPITAL | Age: 57
Discharge: HOME | End: 2022-11-09
Attending: INTERNAL MEDICINE
Payer: COMMERCIAL

## 2022-11-09 DIAGNOSIS — L40.50 PSA (PSORIATIC ARTHRITIS): ICD-10-CM

## 2022-11-09 DIAGNOSIS — M06.00 SERONEGATIVE RHEUMATOID ARTHRITIS: ICD-10-CM

## 2022-11-09 DIAGNOSIS — M75.51 ACUTE SHOULDER BURSITIS, RIGHT: ICD-10-CM

## 2022-11-09 DIAGNOSIS — M51.36 DDD (DEGENERATIVE DISC DISEASE), LUMBAR: ICD-10-CM

## 2022-11-09 DIAGNOSIS — D69.3 CHRONIC ITP (IDIOPATHIC THROMBOCYTOPENIA): ICD-10-CM

## 2022-11-09 DIAGNOSIS — R77.8 ABNORMAL SPEP: ICD-10-CM

## 2022-11-09 DIAGNOSIS — M35.00 SJOGREN'S SYNDROME, WITH UNSPECIFIED ORGAN INVOLVEMENT: ICD-10-CM

## 2022-11-09 DIAGNOSIS — M17.12 PRIMARY OSTEOARTHRITIS OF LEFT KNEE: ICD-10-CM

## 2022-11-09 DIAGNOSIS — L40.50 PSORIATIC ARTHRITIS: Primary | ICD-10-CM

## 2022-11-09 PROCEDURE — 72170 X-RAY EXAM OF PELVIS: CPT | Mod: TC,PO

## 2022-11-09 PROCEDURE — 72170 XR PELVIS ROUTINE AP: ICD-10-PCS | Mod: 26,,, | Performed by: RADIOLOGY

## 2022-11-09 PROCEDURE — 73564 X-RAY EXAM KNEE 4 OR MORE: CPT | Mod: TC,50,PO

## 2022-11-09 PROCEDURE — 72220 X-RAY EXAM SACRUM TAILBONE: CPT | Mod: 26,,, | Performed by: RADIOLOGY

## 2022-11-09 PROCEDURE — 73564 X-RAY EXAM KNEE 4 OR MORE: CPT | Mod: 26,,, | Performed by: RADIOLOGY

## 2022-11-09 PROCEDURE — 72170 X-RAY EXAM OF PELVIS: CPT | Mod: 26,,, | Performed by: RADIOLOGY

## 2022-11-09 PROCEDURE — 73564 XR KNEE ORTHO BILAT WITH FLEXION: ICD-10-PCS | Mod: 26,,, | Performed by: RADIOLOGY

## 2022-11-09 PROCEDURE — 72220 XR SACRUM AND COCCYX: ICD-10-PCS | Mod: 26,,, | Performed by: RADIOLOGY

## 2022-11-09 PROCEDURE — 72220 X-RAY EXAM SACRUM TAILBONE: CPT | Mod: TC,PO

## 2022-11-09 NOTE — TELEPHONE ENCOUNTER
Benefits Investigation - Otezla    Insurance name: [employer]  Rep name: [employer] website    Copay amount: $18.99 (evoucher applied)  Deductible: $0  OOPmax: $3500  OSP in network? [place] or N  Tier level (if applicable): N/A    Financial Assistance - Otezla    -Obtained consent for copay card? Yes    Otezla copay card obtained and entered into [employer].  Test claim going through for $0.    Pending for initial consult.

## 2022-11-09 NOTE — TELEPHONE ENCOUNTER
Call attempt 1 Otezla for PsA initial consult and schedule delivery. $0 copay @ [facility] No answer, LVM

## 2022-11-09 NOTE — TELEPHONE ENCOUNTER
Specialty Pharmacy - Initial Clinical Assessment    Specialty Medication Orders Linked to Encounter      Flowsheet Row Most Recent Value   Medication #1 apremilast (OTEZLA) 30 mg Tab (Order#[id number], Rx#[id number])          Patient Diagnosis   L40.50 - Psoriatic arthritis    Subjective    Mack Melton is a 57 y.o. female, who is followed by the specialty pharmacy service for management and education.    Recent Encounters       Date Type Provider Description    11/09/2022 Specialty Pharmacy Una Nguyen, [place] Initial Clinical Assessment    11/08/2022 Specialty Pharmacy Nieves Covington, [place] Referral Authorization          Clinical call attempts since last clinical assessment   No call attempts found.     Current Outpatient Medications   Medication Sig    amLODIPine (NORVASC) 5 MG tablet Take 5 mg by mouth.    apremilast (OTEZLA) 30 mg Tab Take 1 tablet (30 mg total) by mouth 2 (two) times daily.    buPROPion (WELLBUTRIN XL) 150 MG TB24 tablet Take 150 mg by mouth every morning.    CALCIUM CARBONATE-VITAMIN D3 ORAL Take by mouth.    cevimeline (EVOXAC) 30 mg capsule Take 1 capsule (30 mg total) by mouth 3 (three) times daily.    cholecalciferol, vitamin D3, 5,000 unit Tab Take by mouth.    co-enzyme Q-10 30 mg capsule Take 30 mg by mouth.    cyanocobalamin 1,000 mcg/mL injection INJECT ONE ML IN THE MUSCLE WEEKLY    dextroamphetamine-amphetamine (ADDERALL) 20 mg tablet 1 tablet    estrogen, conjugated,-medroxyprogesterone (PREMPRO) 0.625-5 mg per tablet Take 1 tablet by mouth.    fish oil-omega-3 fatty acids 300-1,000 mg capsule Take 500 mg by mouth 2 (two) times daily.    fluconazole (DIFLUCAN) 150 MG Tab TAKE 1 TABLET NOW AND REPEAT AFTER 7 DAYS IF SYMPTOMS PERSIST    fluticasone propionate (FLONASE) 50 mcg/actuation nasal spray 2 sprays by Nasal route.    folic acid (FOLVITE) 1 MG tablet TAKE ONE TABLET BY MOUTH DAILY    ibuprofen (ADVIL,MOTRIN) 800 MG tablet TAKE 1 TABLET BY MOUTH EVERY 6 TO 8 HOURS FOR PAIN WITH  FOOD OR MILK.    levothyroxine 125 mcg Cap Take 1 capsule by mouth once daily.    LIDOcaine (LIDODERM) 5 % Place 1 patch onto the skin once daily. Remove & Discard patch within 12 hours or as directed by MD    LIDOcaine-prilocaine (EMLA) cream Apply topically as needed.    liothyronine (CYTOMEL) 5 MCG Tab TAKE ONE TABLET BY MOUTH TWICE DAILY    metformin (GLUCOPHAGE-XR) 500 MG 24 hr tablet Take 500 mg by mouth.    methylPREDNISolone (MEDROL) 4 MG Tab Take 2 tablets (8 mg total) by mouth once daily.    modafiniL (PROVIGIL) 200 MG Tab Take 1 tablet (200 mg total) by mouth once daily.    norethindrone-ethinyl estradiol (FEMHRT 1/5) 1-5 mg-mcg Tab     pantoprazole (PROTONIX) 40 MG tablet Take 40 mg by mouth once daily.    terazosin (HYTRIN) 2 MG capsule Take 2 mg by mouth.    tiZANidine (ZANAFLEX) 4 MG tablet Take 1 tablet (4 mg total) by mouth every 8 (eight) hours as needed.    traMADoL (ULTRAM) 50 mg tablet Take 1 tablet (50 mg total) by mouth every 8 (eight) hours as needed for Pain.    zolpidem (AMBIEN) 5 MG Tab Take 1 tablet (5 mg total) by mouth nightly as needed (insomnia).   Last reviewed on 10/17/2022  3:40 PM by Verónica Danielle MA    Review of patient's allergies indicates:  No Known AllergiesLast reviewed on  11/9/2022 3:52 PM by Una Nguyen    Drug Interactions    Drug interactions evaluated: yes  Clinically relevant drug interactions identified: no  Provided the patient with educational material regarding drug interactions: not applicable         Adverse Effects    *All other systems reviewed and are negative       Assessment Questions - Documented Responses      Flowsheet Row Most Recent Value   Assessment    Medication Reconciliation completed for patient Yes   During the past 4 weeks, did patient have any of the following urgent care visits? None   Goals of Therapy Status Discussed (new start)   Status of the patients ability to self-administer: Is Able   All education points have been covered with  "patient? No, patient declined- printed education provided  [pt receivedd samples from office. reviewed with Rheum, declined review of Otezla with osp]   Welcome packet contents reviewed and discussed with patient? Yes   Assesment completed? Yes   Plan Therapy being initiated   Do you need to open a clinical intervention (i-vent)? No   Do you want to schedule first shipment? Yes   Medication #1 Assessment Info    Patient status New medication, New to [facility]   Is this medication appropriate for the patient? Yes   Is this medication effective? --  [pt started 2 weeks ago with samples]          Refill Questions - Documented Responses      Flowsheet Row Most Recent Value   Refill Screening Questions    When does the patient need to receive the medication? 11/16/22   Refill Delivery Questions    How will the patient receive the medication? MEDRx   When does the patient need to receive the medication? 11/16/22   Shipping Address Prescription   Address in [facility] confirmed and updated if neccessary? Yes   Expected Copay ($) 0   Is the patient able to afford the medication copay? Yes   Payment Method zero copay   Days supply of Refill 30   Supplies needed? No supplies needed   Refill activity completed? Yes   Refill activity plan Refill scheduled   Shipment/Pickup Date: 11/11/22            Objective    She has a past medical history of Acid reflux, Arthritis, History of colon polyps, Hypertension, Sjogren's syndrome, Sleep apnea, and Thyroid cancer.    Tried/failed medications: medrol, plaquenil     BP Readings from Last 4 Encounters:   10/17/22 (!) 142/70   06/16/22 123/79   02/21/22 138/74   10/18/21 127/79     Ht Readings from Last 4 Encounters:   10/17/22 5' 7.01" (1.702 m)   06/16/22 5' 7" (1.702 m)   02/21/22 5' 7" (1.702 m)   10/18/21 5' 7" (1.702 m)     Wt Readings from Last 4 Encounters:   10/17/22 114 kg (251 lb 5.2 oz)   06/16/22 108.9 kg (240 lb)   02/21/22 110.7 kg (244 lb)   10/18/21 113.4 kg (250 lb) "     No results for input(s): CREATININE, ALT, AST, HEPBSAG, HEPBSAB, HEPBCAB in the last 2160 hours.  The goals of prescribed drug therapy management include:  Supporting patient to meet the prescriber's medical treatment objectives  Improving or maintaining quality of life  Maintaining optimal therapy adherence  Minimizing and managing side effects      Goals of Therapy Status: Discussed (new start)    Assessment/Plan  Patient plans to start therapy on 11/16/22      Indication, dosage, appropriateness, effectiveness, safety and convenience of her specialty medication(s) were reviewed today.     Patient Education   Pharmacist offer to  patient was declined. Printed educational materials will be provided with medication.      OSP services and hours of operations discussed. Pt declined full consult she reports she started Otezla samples from office over 2 weeks ago. She completed titration dose and now on 30 mg BID without any issues or SE. Advised pt to contact her doctor should she experience any new/worsening s/sx of depression or kidney changes. Pt voiced understanding and had no further questions.     Tasks added this encounter   12/9/2022 - Refill Call (Auto Added)  8/9/2023 - Clinical - Follow Up Assesement (Annual)   Tasks due within next 3 months   No tasks due.     Una Nguyen, PharmD  [facility]  [address] [facility] [address]  Phone: [phone]  Fax: [phone]

## 2022-12-09 ENCOUNTER — SPECIALTY PHARMACY (OUTPATIENT)
Dept: PHARMACY | Facility: CLINIC | Age: 57
End: 2022-12-09
Payer: COMMERCIAL

## 2022-12-09 NOTE — TELEPHONE ENCOUNTER
Specialty Pharmacy - Refill Coordination    Specialty Medication Orders Linked to Encounter      Flowsheet Row Most Recent Value   Medication #1 apremilast (OTEZLA) 30 mg Tab (Order#[id number], Rx#[id number])            Refill Questions - Documented Responses      Flowsheet Row Most Recent Value   Patient Availability and HIPAA Verification    Does patient want to proceed with activity? Yes   HIPAA/medical authority confirmed? Yes   Relationship to patient of person spoken to? Self   Refill Screening Questions    Changes to allergies? No   Changes to medications? No   New conditions since last clinic visit? No   Unplanned office visit, urgent care, ED, or hospital admission in the last 4 weeks? No   How does patient/caregiver feel medication is working? Good   Financial problems or insurance changes? No   How many doses of your specialty medications were missed in the last 4 weeks? 0   Would patient like to speak to a pharmacist? No   When does the patient need to receive the medication? 12/16/22   Refill Delivery Questions    How will the patient receive the medication? MEDRx   When does the patient need to receive the medication? 12/16/22   Shipping Address Home   Address in [facility] confirmed and updated if neccessary? Yes   Expected Copay ($) 0   Is the patient able to afford the medication copay? Yes   Payment Method zero copay   Days supply of Refill 30   Supplies needed? No supplies needed   Refill activity completed? Yes   Refill activity plan Refill scheduled   Shipment/Pickup Date: 12/13/22            Current Outpatient Medications   Medication Sig    amLODIPine (NORVASC) 5 MG tablet Take 5 mg by mouth.    apremilast (OTEZLA) 30 mg Tab Take 1 tablet (30 mg total) by mouth 2 (two) times daily.    buPROPion (WELLBUTRIN XL) 150 MG TB24 tablet Take 150 mg by mouth every morning.    cevimeline (EVOXAC) 30 mg capsule Take 1 capsule (30 mg total) by mouth 3 (three) times daily.    cholecalciferol,  vitamin D3, 5,000 unit Tab Take by mouth.    co-enzyme Q-10 30 mg capsule Take 30 mg by mouth.    cyanocobalamin 1,000 mcg/mL injection INJECT ONE ML IN THE MUSCLE WEEKLY    fluticasone propionate (FLONASE) 50 mcg/actuation nasal spray 2 sprays by Nasal route.    folic acid (FOLVITE) 1 MG tablet TAKE ONE TABLET BY MOUTH DAILY    levothyroxine 125 mcg Cap Take 1 capsule by mouth once daily.    LIDOcaine (LIDODERM) 5 % Place 1 patch onto the skin once daily. Remove & Discard patch within 12 hours or as directed by MD    liothyronine (CYTOMEL) 5 MCG Tab TAKE ONE TABLET BY MOUTH TWICE DAILY    metformin (GLUCOPHAGE-XR) 500 MG 24 hr tablet Take 500 mg by mouth.    methylPREDNISolone (MEDROL) 4 MG Tab Take 2 tablets (8 mg total) by mouth once daily.    modafiniL (PROVIGIL) 200 MG Tab Take 1 tablet (200 mg total) by mouth once daily.    norethindrone-ethinyl estradiol (FEMHRT 1/5) 1-5 mg-mcg Tab     pantoprazole (PROTONIX) 40 MG tablet Take 40 mg by mouth once daily.    terazosin (HYTRIN) 2 MG capsule Take 2 mg by mouth.    tiZANidine (ZANAFLEX) 4 MG tablet Take 1 tablet (4 mg total) by mouth every 8 (eight) hours as needed.    traMADoL (ULTRAM) 50 mg tablet Take 1 tablet (50 mg total) by mouth every 8 (eight) hours as needed for Pain.    zolpidem (AMBIEN) 5 MG Tab Take 1 tablet (5 mg total) by mouth nightly as needed (insomnia).   Last reviewed on 11/9/2022  4:37 PM by Una Nguyen PharmD    Review of patient's allergies indicates:  No Known Allergies Last reviewed on  11/9/2022 3:52 PM by Una Nguyen      Tasks added this encounter   1/8/2023 - Refill Call (Auto Added)   Tasks due within next 3 months   No tasks due.     Rosanna Ford, PharmD  [facility]  [address] [place] [address]  Phone: [phone]  Fax: [phone]

## 2023-01-09 ENCOUNTER — PATIENT MESSAGE (OUTPATIENT)
Dept: PHARMACY | Facility: CLINIC | Age: 58
End: 2023-01-09
Payer: COMMERCIAL

## 2023-01-09 ENCOUNTER — SPECIALTY PHARMACY (OUTPATIENT)
Dept: PHARMACY | Facility: CLINIC | Age: 58
End: 2023-01-09
Payer: COMMERCIAL

## 2023-01-10 NOTE — TELEPHONE ENCOUNTER
Specialty Pharmacy - Refill Coordination    Specialty Medication Orders Linked to Encounter      Flowsheet Row Most Recent Value   Medication #1 apremilast (OTEZLA) 30 mg Tab (Order#[id number], Rx#[id number])            Refill Questions - Documented Responses      Flowsheet Row Most Recent Value   Patient Availability and HIPAA Verification    Does patient want to proceed with activity? Yes   HIPAA/medical authority confirmed? Yes   Relationship to patient of person spoken to? Self   Refill Screening Questions    Changes to allergies? No   Changes to medications? No   New conditions since last clinic visit? No   Unplanned office visit, urgent care, ED, or hospital admission in the last 4 weeks? No   How does patient/caregiver feel medication is working? Good   Financial problems or insurance changes? No   How many doses of your specialty medications were missed in the last 4 weeks? 0   Would patient like to speak to a pharmacist? No   When does the patient need to receive the medication? 01/14/23   Refill Delivery Questions    How will the patient receive the medication? MEDRx   When does the patient need to receive the medication? 01/14/23   Shipping Address Prescription   Address in [facility] confirmed and updated if neccessary? Yes   Expected Copay ($) 0   Is the patient able to afford the medication copay? Yes   Payment Method zero copay   Days supply of Refill 30   Supplies needed? No supplies needed   Refill activity completed? Yes   Refill activity plan Refill scheduled   Shipment/Pickup Date: 01/11/23            Current Outpatient Medications   Medication Sig    amLODIPine (NORVASC) 5 MG tablet Take 5 mg by mouth.    apremilast (OTEZLA) 30 mg Tab Take 1 tablet (30 mg total) by mouth 2 (two) times daily.    buPROPion (WELLBUTRIN XL) 150 MG TB24 tablet Take 150 mg by mouth every morning.    cevimeline (EVOXAC) 30 mg capsule Take 1 capsule (30 mg total) by mouth 3 (three) times daily.     cholecalciferol, vitamin D3, 5,000 unit Tab Take by mouth.    co-enzyme Q-10 30 mg capsule Take 30 mg by mouth.    cyanocobalamin 1,000 mcg/mL injection INJECT ONE ML IN THE MUSCLE WEEKLY    fluticasone propionate (FLONASE) 50 mcg/actuation nasal spray 2 sprays by Nasal route.    folic acid (FOLVITE) 1 MG tablet TAKE ONE TABLET BY MOUTH DAILY    levothyroxine 125 mcg Cap Take 1 capsule by mouth once daily.    LIDOcaine (LIDODERM) 5 % Place 1 patch onto the skin once daily. Remove & Discard patch within 12 hours or as directed by MD    liothyronine (CYTOMEL) 5 MCG Tab TAKE ONE TABLET BY MOUTH TWICE DAILY    metformin (GLUCOPHAGE-XR) 500 MG 24 hr tablet Take 500 mg by mouth.    methylPREDNISolone (MEDROL) 4 MG Tab Take 2 tablets (8 mg total) by mouth once daily.    modafiniL (PROVIGIL) 200 MG Tab Take 1 tablet (200 mg total) by mouth once daily.    norethindrone-ethinyl estradiol (FEMHRT 1/5) 1-5 mg-mcg Tab     pantoprazole (PROTONIX) 40 MG tablet Take 40 mg by mouth once daily.    terazosin (HYTRIN) 2 MG capsule Take 2 mg by mouth.    tiZANidine (ZANAFLEX) 4 MG tablet Take 1 tablet (4 mg total) by mouth every 8 (eight) hours as needed.    traMADoL (ULTRAM) 50 mg tablet Take 1 tablet (50 mg total) by mouth every 8 (eight) hours as needed for Pain.    zolpidem (AMBIEN) 5 MG Tab Take 1 tablet (5 mg total) by mouth nightly as needed (insomnia).   Last reviewed on 11/9/2022  4:37 PM by Una Nguyen PharmD    Review of patient's allergies indicates:  No Known Allergies Last reviewed on  11/9/2022 3:52 PM by Una Nguyen      Tasks added this encounter   2/6/2023 - Refill Call (Auto Added)   Tasks due within next 3 months   No tasks due.     Tony Luciano, PharmD  [facility] - [facility]  [address] [place] [address]  Phone: [phone]  Fax: [phone]

## 2023-02-06 ENCOUNTER — SPECIALTY PHARMACY (OUTPATIENT)
Dept: PHARMACY | Facility: CLINIC | Age: 58
End: 2023-02-06
Payer: COMMERCIAL

## 2023-02-06 NOTE — TELEPHONE ENCOUNTER
Outgoing call: Patient stated she has about 2 weeks left on hand, she has missed several doses because of how she works.She requested that [facility] give her a call back in 1 week.

## 2023-02-15 NOTE — TELEPHONE ENCOUNTER
Specialty Pharmacy - Refill Coordination    Specialty Medication Orders Linked to Encounter      Flowsheet Row Most Recent Value   Medication #1 apremilast (OTEZLA) 30 mg Tab (Order#[id number], Rx#[id number])            Refill Questions - Documented Responses      Flowsheet Row Most Recent Value   Patient Availability and HIPAA Verification    Does patient want to proceed with activity? Yes   HIPAA/medical authority confirmed? Yes   Relationship to patient of person spoken to? Self   Refill Screening Questions    Changes to allergies? No   Changes to medications? No   New conditions since last clinic visit? No   Unplanned office visit, urgent care, ED, or hospital admission in the last 4 weeks? No   How does patient/caregiver feel medication is working? Excellent   Financial problems or insurance changes? No   How many doses of your specialty medications were missed in the last 4 weeks? 2   Why were doses missed? Busy with other things   Would patient like to speak to a pharmacist? No   When does the patient need to receive the medication? 02/18/23   Refill Delivery Questions    How will the patient receive the medication? MEDRx   When does the patient need to receive the medication? 02/18/23   Shipping Address Home   Address in [facility] confirmed and updated if neccessary? Yes   Expected Copay ($) 0   Is the patient able to afford the medication copay? Yes   Payment Method zero copay   Days supply of Refill 30   Supplies needed? No supplies needed   Refill activity completed? Yes   Refill activity plan Refill scheduled   Shipment/Pickup Date: 02/16/23            Current Outpatient Medications   Medication Sig    amLODIPine (NORVASC) 5 MG tablet Take 5 mg by mouth.    apremilast (OTEZLA) 30 mg Tab Take 1 tablet (30 mg total) by mouth 2 (two) times daily.    buPROPion (WELLBUTRIN XL) 150 MG TB24 tablet Take 150 mg by mouth every morning.    cevimeline (EVOXAC) 30 mg capsule Take 1 capsule (30 mg total) by  mouth 3 (three) times daily.    cholecalciferol, vitamin D3, 5,000 unit Tab Take by mouth.    co-enzyme Q-10 30 mg capsule Take 30 mg by mouth.    cyanocobalamin 1,000 mcg/mL injection INJECT ONE ML IN THE MUSCLE WEEKLY    fluticasone propionate (FLONASE) 50 mcg/actuation nasal spray 2 sprays by Nasal route.    folic acid (FOLVITE) 1 MG tablet TAKE ONE TABLET BY MOUTH DAILY    levothyroxine 125 mcg Cap Take 1 capsule by mouth once daily.    LIDOcaine (LIDODERM) 5 % Place 1 patch onto the skin once daily. Remove & Discard patch within 12 hours or as directed by MD    liothyronine (CYTOMEL) 5 MCG Tab TAKE ONE TABLET BY MOUTH TWICE DAILY    metformin (GLUCOPHAGE-XR) 500 MG 24 hr tablet Take 500 mg by mouth.    methylPREDNISolone (MEDROL) 4 MG Tab Take 2 tablets (8 mg total) by mouth once daily.    modafiniL (PROVIGIL) 200 MG Tab Take 1 tablet (200 mg total) by mouth once daily.    norethindrone-ethinyl estradiol (FEMHRT 1/5) 1-5 mg-mcg Tab     pantoprazole (PROTONIX) 40 MG tablet Take 40 mg by mouth once daily.    terazosin (HYTRIN) 2 MG capsule Take 2 mg by mouth.    tiZANidine (ZANAFLEX) 4 MG tablet Take 1 tablet (4 mg total) by mouth every 8 (eight) hours as needed.    traMADoL (ULTRAM) 50 mg tablet Take 1 tablet (50 mg total) by mouth every 8 (eight) hours as needed for Pain.    zolpidem (AMBIEN) 5 MG Tab Take 1 tablet (5 mg total) by mouth nightly as needed (insomnia).   Last reviewed on 11/9/2022  4:37 PM by Una Nguyen PharmD    Review of patient's allergies indicates:  No Known Allergies Last reviewed on  11/9/2022 3:52 PM by Una Nguyen      Tasks added this encounter   3/13/2023 - Refill Call (Auto Added)   Tasks due within next 3 months   No tasks due.     Kanchan Samuels [facility] - [facility] Pharm[facility]  [address] [place] [id number]  Phone: [phone]  Fax: [phone]

## 2023-03-14 ENCOUNTER — SPECIALTY PHARMACY (OUTPATIENT)
Dept: PHARMACY | Facility: CLINIC | Age: 58
End: 2023-03-14
Payer: COMMERCIAL

## 2023-03-14 NOTE — TELEPHONE ENCOUNTER
Specialty Pharmacy - Refill Coordination    Specialty Medication Orders Linked to Encounter      Flowsheet Row Most Recent Value   Medication #1 apremilast (OTEZLA) 30 mg Tab (Order#[id number], Rx#[id number])            Refill Questions - Documented Responses      Flowsheet Row Most Recent Value   Patient Availability and HIPAA Verification    Does patient want to proceed with activity? Yes   HIPAA/medical authority confirmed? Yes   Relationship to patient of person spoken to? Self   Refill Screening Questions    Changes to allergies? No   Changes to medications? No   New conditions since last clinic visit? No   Unplanned office visit, urgent care, ED, or hospital admission in the last 4 weeks? No   How does patient/caregiver feel medication is working? Good   Financial problems or insurance changes? No   How many doses of your specialty medications were missed in the last 4 weeks? 0   Would patient like to speak to a pharmacist? No   When does the patient need to receive the medication? 03/20/23   Refill Delivery Questions    How will the patient receive the medication? MEDRx   When does the patient need to receive the medication? 03/20/23   Shipping Address Home   Address in [facility] confirmed and updated if neccessary? Yes   Expected Copay ($) 0   Is the patient able to afford the medication copay? Yes   Payment Method zero copay   Days supply of Refill 30   Supplies needed? No supplies needed   Refill activity completed? Yes   Refill activity plan Refill scheduled   Shipment/Pickup Date: 03/15/23            Current Outpatient Medications   Medication Sig    amLODIPine (NORVASC) 5 MG tablet Take 5 mg by mouth.    apremilast (OTEZLA) 30 mg Tab Take 1 tablet (30 mg total) by mouth 2 (two) times daily.    buPROPion (WELLBUTRIN XL) 150 MG TB24 tablet Take 150 mg by mouth every morning.    cevimeline (EVOXAC) 30 mg capsule Take 1 capsule (30 mg total) by mouth 3 (three) times daily.    cholecalciferol,  vitamin D3, 5,000 unit Tab Take by mouth.    co-enzyme Q-10 30 mg capsule Take 30 mg by mouth.    cyanocobalamin 1,000 mcg/mL injection INJECT ONE ML IN THE MUSCLE WEEKLY    fluticasone propionate (FLONASE) 50 mcg/actuation nasal spray 2 sprays by Nasal route.    folic acid (FOLVITE) 1 MG tablet TAKE ONE TABLET BY MOUTH DAILY    levothyroxine 125 mcg Cap Take 1 capsule by mouth once daily.    LIDOcaine (LIDODERM) 5 % Place 1 patch onto the skin once daily. Remove & Discard patch within 12 hours or as directed by MD    liothyronine (CYTOMEL) 5 MCG Tab TAKE ONE TABLET BY MOUTH TWICE DAILY    metformin (GLUCOPHAGE-XR) 500 MG 24 hr tablet Take 500 mg by mouth.    methylPREDNISolone (MEDROL) 4 MG Tab Take 2 tablets (8 mg total) by mouth once daily.    modafiniL (PROVIGIL) 200 MG Tab Take 1 tablet (200 mg total) by mouth once daily.    norethindrone-ethinyl estradiol (FEMHRT 1/5) 1-5 mg-mcg Tab     pantoprazole (PROTONIX) 40 MG tablet Take 40 mg by mouth once daily.    terazosin (HYTRIN) 2 MG capsule Take 2 mg by mouth.    tiZANidine (ZANAFLEX) 4 MG tablet Take 1 tablet (4 mg total) by mouth every 8 (eight) hours as needed.    traMADoL (ULTRAM) 50 mg tablet Take 1 tablet (50 mg total) by mouth every 8 (eight) hours as needed for Pain.    zolpidem (AMBIEN) 5 MG Tab Take 1 tablet (5 mg total) by mouth nightly as needed (insomnia).   Last reviewed on 11/9/2022  4:37 PM by Una Nguyen, PharmD    Review of patient's allergies indicates:  No Known Allergies Last reviewed on  11/9/2022 3:52 PM by Una Nguyen      Tasks added this encounter   4/12/2023 - Refill Call (Auto Added)   Tasks due within next 3 months   No tasks due.     Hien Gomez  [facility] - Specialty Pharm[facility]  [address] [place] [id number]  Phone: [phone]  Fax: [phone]

## 2023-04-11 DIAGNOSIS — L40.50 PSA (PSORIATIC ARTHRITIS): ICD-10-CM

## 2023-04-12 ENCOUNTER — SPECIALTY PHARMACY (OUTPATIENT)
Dept: PHARMACY | Facility: CLINIC | Age: 58
End: 2023-04-12
Payer: COMMERCIAL

## 2023-04-12 NOTE — TELEPHONE ENCOUNTER
Outgoing call regarding otezla refill; per pt, she has about 2 weeks on hand; informed her that [facility] will follow up on 4/17 to schedule delivery

## 2023-04-15 RX ORDER — APREMILAST 30 MG/1
30 TABLET, FILM COATED ORAL 2 TIMES DAILY
Qty: 60 TABLET | Refills: 11 | Status: SHIPPED | OUTPATIENT
Start: 2023-04-15 | End: 2023-04-24 | Stop reason: SDUPTHER

## 2023-04-17 ENCOUNTER — PATIENT MESSAGE (OUTPATIENT)
Dept: PHARMACY | Facility: CLINIC | Age: 58
End: 2023-04-17
Payer: COMMERCIAL

## 2023-04-17 DIAGNOSIS — L40.50 PSA (PSORIATIC ARTHRITIS): ICD-10-CM

## 2023-04-17 RX ORDER — APREMILAST 30 MG/1
30 TABLET, FILM COATED ORAL 2 TIMES DAILY
Qty: 60 TABLET | Refills: 11 | Status: CANCELLED | OUTPATIENT
Start: 2023-04-17 | End: 2024-04-16

## 2023-04-17 NOTE — TELEPHONE ENCOUNTER
Patient returned call for Otezla refill. Rx from 4/15 is in print status. Urgent InBasket sent to [facility] and refill request to [facility]. Patient has 7 days of medication on hand. Will follow up once Rx is received.

## 2023-04-24 NOTE — TELEPHONE ENCOUNTER
Otjay refills authorized as a verbal prescription by Erma and entered into [facility]. Requiring re-authorization.     Tyrone JENKINS submitted.   [facility]: [id number]

## 2023-04-26 NOTE — TELEPHONE ENCOUNTER
Tyrone JENKINS denied due to need for updated chart notes indicating improvement on Otezla.   Case ID: [id number]    Message sent to [facility].

## 2023-04-27 ENCOUNTER — TELEPHONE (OUTPATIENT)
Dept: RHEUMATOLOGY | Facility: CLINIC | Age: 58
End: 2023-04-27
Payer: COMMERCIAL

## 2023-04-27 NOTE — TELEPHONE ENCOUNTER
----- Message from Nieves Covington PharmD sent at 4/26/2023 10:16 AM CDT -----  Regarding: Otezla  Good morning Bonnie Gimenez, and staff,     [facility] is working on a reauthorization on new insurance for Otezla. Our PA was denied due to lack of updated chart notes showing improvements on Otezla. I am aware that Ms. Melton has not returned to clinic since starting Otezla, however, the plan insists that they are unable to approve continuation without this. They informed me that even a telephone encounter indicating tolerance and stability on the medication would be sufficient. Would you or your staff be able to reach out to patient for this?      Kind regards,   Nieves Covington, Kanchan

## 2023-04-27 NOTE — TELEPHONE ENCOUNTER
Dr. Suarez,    Can you reach out to the patient to document stability and improvement on otezla for insurance approval?

## 2023-04-28 ENCOUNTER — PATIENT MESSAGE (OUTPATIENT)
Dept: RHEUMATOLOGY | Facility: CLINIC | Age: 58
End: 2023-04-28
Payer: COMMERCIAL

## 2023-04-28 NOTE — TELEPHONE ENCOUNTER
Called pt to see how she is doing on Otezla. No answer. LM. Also, sent [facility] msg. Will f/u if no response next week

## 2023-04-28 NOTE — TELEPHONE ENCOUNTER
Pt returned my call. Stated that she is doing much better on Otezla. Stated that she has significantly less joint stiffness, less numbness, and less tingling. Feels that she is doing excellent and reports no side effects or issue with Otezla.     Routed [facility] rheum supervisor and provider   Curettage Text: The wound bed was treated with curettage after the biopsy was performed.

## 2023-05-01 NOTE — TELEPHONE ENCOUNTER
Specialty Pharmacy - Refill Coordination  Specialty Pharmacy - Medication/Referral Authorization    Specialty Medication Orders Linked to Encounter      Flowsheet Row Most Recent Value   Medication #1 apremilast (OTEZLA) 30 mg Tab (Order#[id number], Rx#[id number])            Refill Questions - Documented Responses      Flowsheet Row Most Recent Value   Patient Availability and HIPAA Verification    Does patient want to proceed with activity? Yes   HIPAA/medical authority confirmed? Yes   Relationship to patient of person spoken to? Self   Refill Screening Questions    Changes to allergies? No   Changes to medications? No   New conditions since last clinic visit? No   Unplanned office visit, urgent care, ED, or hospital admission in the last 4 weeks? No   How does patient/caregiver feel medication is working? Good   Financial problems or insurance changes? No   How many doses of your specialty medications were missed in the last 4 weeks? 1   Why were doses missed? Simply forgot   Would patient like to speak to a pharmacist? No   When does the patient need to receive the medication? 05/03/23   Refill Delivery Questions    How will the patient receive the medication? MEDRx   When does the patient need to receive the medication? 05/03/23   Shipping Address Prescription   Address in [facility] confirmed and updated if neccessary? Yes   Expected Copay ($) 0   Is the patient able to afford the medication copay? Yes   Payment Method zero copay   Days supply of Refill 30   Supplies needed? No supplies needed   Refill activity completed? Yes   Refill activity plan Refill scheduled   Shipment/Pickup Date: 05/02/23            Current Outpatient Medications   Medication Sig    amLODIPine (NORVASC) 5 MG tablet Take 5 mg by mouth.    apremilast (OTEZLA) 30 mg Tab Take 1 tablet (30 mg total) by mouth 2 (two) times daily.    buPROPion (WELLBUTRIN XL) 150 MG TB24 tablet Take 150 mg by mouth every morning.    cevimeline  (EVOXAC) 30 mg capsule Take 1 capsule (30 mg total) by mouth 3 (three) times daily.    cholecalciferol, vitamin D3, 5,000 unit Tab Take by mouth.    co-enzyme Q-10 30 mg capsule Take 30 mg by mouth.    cyanocobalamin 1,000 mcg/mL injection INJECT ONE ML IN THE MUSCLE WEEKLY    fluticasone propionate (FLONASE) 50 mcg/actuation nasal spray 2 sprays by Nasal route.    folic acid (FOLVITE) 1 MG tablet TAKE ONE TABLET BY MOUTH DAILY    levothyroxine 125 mcg Cap Take 1 capsule by mouth once daily.    LIDOcaine (LIDODERM) 5 % Place 1 patch onto the skin once daily. Remove & Discard patch within 12 hours or as directed by MD    liothyronine (CYTOMEL) 5 MCG Tab TAKE ONE TABLET BY MOUTH TWICE DAILY    metformin (GLUCOPHAGE-XR) 500 MG 24 hr tablet Take 500 mg by mouth.    methylPREDNISolone (MEDROL) 4 MG Tab Take 2 tablets (8 mg total) by mouth once daily.    modafiniL (PROVIGIL) 200 MG Tab Take 1 tablet (200 mg total) by mouth once daily.    norethindrone-ethinyl estradiol (FEMHRT 1/5) 1-5 mg-mcg Tab     pantoprazole (PROTONIX) 40 MG tablet Take 40 mg by mouth once daily.    terazosin (HYTRIN) 2 MG capsule Take 2 mg by mouth.    tiZANidine (ZANAFLEX) 4 MG tablet Take 1 tablet (4 mg total) by mouth every 8 (eight) hours as needed.    traMADoL (ULTRAM) 50 mg tablet Take 1 tablet (50 mg total) by mouth every 8 (eight) hours as needed for Pain.    zolpidem (AMBIEN) 5 MG Tab Take 1 tablet (5 mg total) by mouth nightly as needed (insomnia).   Last reviewed on 11/9/2022  4:37 PM by Una Nguyen PharmD    Review of patient's allergies indicates:  No Known Allergies Last reviewed on  4/11/2023 8:37 AM by Rajani Perez      Tasks added this encounter   No tasks added.   Tasks due within next 3 months   5/1/2023 - Refill Coordination Outreach (1 time occurrence)  4/26/2023 - Benefits Investigation     Fortunato RazaD  [facility] - [facility]  [address] [place] [id number]  Phone: [phone]  Fax:  [phone]

## 2023-05-01 NOTE — TELEPHONE ENCOUNTER
Patient's status on Otezla submitted via fax to [phone] for reconsiderdation.    Otezla PA approved from 5/1/23 to 5/1/24.

## 2023-05-09 ENCOUNTER — PATIENT MESSAGE (OUTPATIENT)
Dept: RESEARCH | Facility: HOSPITAL | Age: 58
End: 2023-05-09
Payer: COMMERCIAL

## 2023-05-26 ENCOUNTER — SPECIALTY PHARMACY (OUTPATIENT)
Dept: PHARMACY | Facility: CLINIC | Age: 58
End: 2023-05-26
Payer: COMMERCIAL

## 2023-05-26 NOTE — TELEPHONE ENCOUNTER
Specialty Pharmacy - Refill Coordination    Specialty Medication Orders Linked to Encounter      Flowsheet Row Most Recent Value   Medication #1 apremilast (OTEZLA) 30 mg Tab (Order#[id number], Rx#[id number])            Refill Questions - Documented Responses      Flowsheet Row Most Recent Value   Patient Availability and HIPAA Verification    Does patient want to proceed with activity? Yes   HIPAA/medical authority confirmed? Yes   Relationship to patient of person spoken to? Self   Refill Screening Questions    Changes to allergies? No   Changes to medications? No   New conditions since last clinic visit? No   Unplanned office visit, urgent care, ED, or hospital admission in the last 4 weeks? No   How does patient/caregiver feel medication is working? Good   Financial problems or insurance changes? No   How many doses of your specialty medications were missed in the last 4 weeks? 1   Why were doses missed? Simply forgot   Would patient like to speak to a pharmacist? No   When does the patient need to receive the medication? 05/30/23   Refill Delivery Questions    How will the patient receive the medication? MEDRx   When does the patient need to receive the medication? 05/30/23   Shipping Address Home   Address in [facility] confirmed and updated if neccessary? Yes   Expected Copay ($) 0   Is the patient able to afford the medication copay? Yes   Payment Method zero copay   Days supply of Refill 30   Supplies needed? No supplies needed   Refill activity completed? Yes   Refill activity plan Refill scheduled   Shipment/Pickup Date: 05/29/23            Current Outpatient Medications   Medication Sig    amLODIPine (NORVASC) 5 MG tablet Take 5 mg by mouth.    apremilast (OTEZLA) 30 mg Tab Take 1 tablet (30 mg total) by mouth 2 (two) times daily.    buPROPion (WELLBUTRIN XL) 150 MG TB24 tablet Take 150 mg by mouth every morning.    cevimeline (EVOXAC) 30 mg capsule Take 1 capsule (30 mg total) by mouth 3 (three)  times daily.    cholecalciferol, vitamin D3, 5,000 unit Tab Take by mouth.    co-enzyme Q-10 30 mg capsule Take 30 mg by mouth.    cyanocobalamin 1,000 mcg/mL injection INJECT ONE ML IN THE MUSCLE WEEKLY    fluticasone propionate (FLONASE) 50 mcg/actuation nasal spray 2 sprays by Nasal route.    folic acid (FOLVITE) 1 MG tablet TAKE ONE TABLET BY MOUTH DAILY    levothyroxine 125 mcg Cap Take 1 capsule by mouth once daily.    LIDOcaine (LIDODERM) 5 % Place 1 patch onto the skin once daily. Remove & Discard patch within 12 hours or as directed by MD    liothyronine (CYTOMEL) 5 MCG Tab TAKE ONE TABLET BY MOUTH TWICE DAILY    metformin (GLUCOPHAGE-XR) 500 MG 24 hr tablet Take 500 mg by mouth.    methylPREDNISolone (MEDROL) 4 MG Tab Take 2 tablets (8 mg total) by mouth once daily.    modafiniL (PROVIGIL) 200 MG Tab Take 1 tablet (200 mg total) by mouth once daily.    norethindrone-ethinyl estradiol (FEMHRT 1/5) 1-5 mg-mcg Tab     pantoprazole (PROTONIX) 40 MG tablet Take 40 mg by mouth once daily.    terazosin (HYTRIN) 2 MG capsule Take 2 mg by mouth.    tiZANidine (ZANAFLEX) 4 MG tablet Take 1 tablet (4 mg total) by mouth every 8 (eight) hours as needed.    traMADoL (ULTRAM) 50 mg tablet Take 1 tablet (50 mg total) by mouth every 8 (eight) hours as needed for Pain.    zolpidem (AMBIEN) 5 MG Tab Take 1 tablet (5 mg total) by mouth nightly as needed (insomnia).   Last reviewed on 11/9/2022  4:37 PM by Una Nguyen PharmD    Review of patient's allergies indicates:  No Known Allergies Last reviewed on  4/11/2023 8:37 AM by Rajani Perez      Tasks added this encounter   No tasks added.   Tasks due within next 3 months   8/9/2023 - Clinical Assessment (1 year recurrence)  5/28/2023 - Refill Coordination Outreach (1 time occurrence)     [facility] - Specialty Pharm[facility]  [address] [place] [id number]  Phone: [phone]  Fax: [phone]

## 2023-05-29 DIAGNOSIS — M35.00 SJOGREN'S SYNDROME, WITH UNSPECIFIED ORGAN INVOLVEMENT: ICD-10-CM

## 2023-05-29 RX ORDER — METHYLPREDNISOLONE 4 MG/1
8 TABLET ORAL DAILY
Qty: 60 TABLET | Refills: 6 | Status: SHIPPED | OUTPATIENT
Start: 2023-05-29 | End: 2023-10-30

## 2023-06-21 ENCOUNTER — SPECIALTY PHARMACY (OUTPATIENT)
Dept: PHARMACY | Facility: CLINIC | Age: 58
End: 2023-06-21
Payer: COMMERCIAL

## 2023-06-21 NOTE — TELEPHONE ENCOUNTER
Specialty Pharmacy - Refill Coordination    Specialty Medication Orders Linked to Encounter      Flowsheet Row Most Recent Value   Medication #1 apremilast (OTEZLA) 30 mg Tab (Order#[id number], Rx#[id number])            Refill Questions - Documented Responses      Flowsheet Row Most Recent Value   Patient Availability and HIPAA Verification    Does patient want to proceed with activity? Yes   HIPAA/medical authority confirmed? Yes   Relationship to patient of person spoken to? Self   Refill Screening Questions    Changes to allergies? No   Changes to medications? No   New conditions since last clinic visit? No   Unplanned office visit, urgent care, ED, or hospital admission in the last 4 weeks? No   How does patient/caregiver feel medication is working? Very good   Financial problems or insurance changes? No   Would patient like to speak to a pharmacist? No   When does the patient need to receive the medication? 06/27/23   Refill Delivery Questions    How will the patient receive the medication? MEDRx   When does the patient need to receive the medication? 06/27/23   Shipping Address Home   Address in [facility] confirmed and updated if neccessary? Yes   Expected Copay ($) 0   Is the patient able to afford the medication copay? Yes   Payment Method zero copay   Days supply of Refill 30   Supplies needed? No supplies needed   Refill activity completed? Yes   Refill activity plan Refill scheduled   Shipment/Pickup Date: 06/27/23            Current Outpatient Medications   Medication Sig    amLODIPine (NORVASC) 5 MG tablet Take 5 mg by mouth.    apremilast (OTEZLA) 30 mg Tab Take 1 tablet (30 mg total) by mouth 2 (two) times daily.    buPROPion (WELLBUTRIN XL) 150 MG TB24 tablet Take 150 mg by mouth every morning.    cevimeline (EVOXAC) 30 mg capsule Take 1 capsule (30 mg total) by mouth 3 (three) times daily.    cholecalciferol, vitamin D3, 5,000 unit Tab Take by mouth.    co-enzyme Q-10 30 mg capsule Take 30  mg by mouth.    cyanocobalamin 1,000 mcg/mL injection INJECT ONE ML IN THE MUSCLE WEEKLY    fluticasone propionate (FLONASE) 50 mcg/actuation nasal spray 2 sprays by Nasal route.    folic acid (FOLVITE) 1 MG tablet TAKE ONE TABLET BY MOUTH DAILY    levothyroxine 125 mcg Cap Take 1 capsule by mouth once daily.    LIDOcaine (LIDODERM) 5 % Place 1 patch onto the skin once daily. Remove & Discard patch within 12 hours or as directed by MD    liothyronine (CYTOMEL) 5 MCG Tab TAKE ONE TABLET BY MOUTH TWICE DAILY    metformin (GLUCOPHAGE-XR) 500 MG 24 hr tablet Take 500 mg by mouth.    methylPREDNISolone (MEDROL) 4 MG Tab Take 2 tablets (8 mg total) by mouth once daily.    modafiniL (PROVIGIL) 200 MG Tab Take 1 tablet (200 mg total) by mouth once daily.    norethindrone-ethinyl estradiol (FEMHRT 1/5) 1-5 mg-mcg Tab     pantoprazole (PROTONIX) 40 MG tablet Take 40 mg by mouth once daily.    terazosin (HYTRIN) 2 MG capsule Take 2 mg by mouth.    tiZANidine (ZANAFLEX) 4 MG tablet Take 1 tablet (4 mg total) by mouth every 8 (eight) hours as needed.    traMADoL (ULTRAM) 50 mg tablet Take 1 tablet (50 mg total) by mouth every 8 (eight) hours as needed for Pain.    zolpidem (AMBIEN) 5 MG Tab Take 1 tablet (5 mg total) by mouth nightly as needed (insomnia).   Last reviewed on 11/9/2022  4:37 PM by Una Nguyen PharmD    Review of patient's allergies indicates:  No Known Allergies Last reviewed on  5/29/2023 8:51 AM by Rajani Perez      Tasks added this encounter   No tasks added.   Tasks due within next 3 months   8/9/2023 - Clinical Assessment (1 year recurrence)     Alex Raza, PharmD  [facility]  [address] [place] [id number]  Phone: [phone]  Fax: [phone]

## 2023-06-21 NOTE — TELEPHONE ENCOUNTER
Outgoing call to pt regarding Otezla. Pt identified herself, but the call was disconnected. I attempted to call the pt back & there was no answer; I left a message. Will follow up.

## 2023-06-26 ENCOUNTER — OFFICE VISIT (OUTPATIENT)
Dept: RHEUMATOLOGY | Facility: CLINIC | Age: 58
End: 2023-06-26
Payer: COMMERCIAL

## 2023-06-26 VITALS
WEIGHT: 238.75 LBS | HEART RATE: 97 BPM | BODY MASS INDEX: 37.47 KG/M2 | HEIGHT: 67 IN | DIASTOLIC BLOOD PRESSURE: 78 MMHG | SYSTOLIC BLOOD PRESSURE: 183 MMHG

## 2023-06-26 DIAGNOSIS — M06.00 SERONEGATIVE RHEUMATOID ARTHRITIS: ICD-10-CM

## 2023-06-26 DIAGNOSIS — L40.50 PSA (PSORIATIC ARTHRITIS): ICD-10-CM

## 2023-06-26 DIAGNOSIS — R77.8 ABNORMAL SPEP: ICD-10-CM

## 2023-06-26 DIAGNOSIS — G89.4 CHRONIC PAIN SYNDROME: ICD-10-CM

## 2023-06-26 DIAGNOSIS — I10 HYPERTENSION, UNSPECIFIED TYPE: ICD-10-CM

## 2023-06-26 DIAGNOSIS — D69.3 CHRONIC ITP (IDIOPATHIC THROMBOCYTOPENIA): ICD-10-CM

## 2023-06-26 DIAGNOSIS — M17.12 PRIMARY OSTEOARTHRITIS OF LEFT KNEE: ICD-10-CM

## 2023-06-26 DIAGNOSIS — M35.00 SJOGREN'S SYNDROME, WITH UNSPECIFIED ORGAN INVOLVEMENT: ICD-10-CM

## 2023-06-26 DIAGNOSIS — E11.69 TYPE 2 DIABETES MELLITUS WITH OTHER SPECIFIED COMPLICATION, WITHOUT LONG-TERM CURRENT USE OF INSULIN: Primary | ICD-10-CM

## 2023-06-26 PROCEDURE — 3078F PR MOST RECENT DIASTOLIC BLOOD PRESSURE < 80 MM HG: ICD-10-PCS | Mod: CPTII,S$GLB,, | Performed by: INTERNAL MEDICINE

## 2023-06-26 PROCEDURE — 1159F PR MEDICATION LIST DOCUMENTED IN MEDICAL RECORD: ICD-10-PCS | Mod: CPTII,S$GLB,, | Performed by: INTERNAL MEDICINE

## 2023-06-26 PROCEDURE — 3077F PR MOST RECENT SYSTOLIC BLOOD PRESSURE >= 140 MM HG: ICD-10-PCS | Mod: CPTII,S$GLB,, | Performed by: INTERNAL MEDICINE

## 2023-06-26 PROCEDURE — 96372 PR INJECTION,THERAP/PROPH/DIAG2ST, IM OR SUBCUT: ICD-10-PCS | Mod: S$GLB,,, | Performed by: INTERNAL MEDICINE

## 2023-06-26 PROCEDURE — 1160F RVW MEDS BY RX/DR IN RCRD: CPT | Mod: CPTII,S$GLB,, | Performed by: INTERNAL MEDICINE

## 2023-06-26 PROCEDURE — 99215 OFFICE O/P EST HI 40 MIN: CPT | Mod: 25,S$GLB,, | Performed by: INTERNAL MEDICINE

## 2023-06-26 PROCEDURE — 3078F DIAST BP <80 MM HG: CPT | Mod: CPTII,S$GLB,, | Performed by: INTERNAL MEDICINE

## 2023-06-26 PROCEDURE — 1160F PR REVIEW ALL MEDS BY PRESCRIBER/CLIN PHARMACIST DOCUMENTED: ICD-10-PCS | Mod: CPTII,S$GLB,, | Performed by: INTERNAL MEDICINE

## 2023-06-26 PROCEDURE — 3008F BODY MASS INDEX DOCD: CPT | Mod: CPTII,S$GLB,, | Performed by: INTERNAL MEDICINE

## 2023-06-26 PROCEDURE — 1159F MED LIST DOCD IN RCRD: CPT | Mod: CPTII,S$GLB,, | Performed by: INTERNAL MEDICINE

## 2023-06-26 PROCEDURE — 3008F PR BODY MASS INDEX (BMI) DOCUMENTED: ICD-10-PCS | Mod: CPTII,S$GLB,, | Performed by: INTERNAL MEDICINE

## 2023-06-26 PROCEDURE — 96372 THER/PROPH/DIAG INJ SC/IM: CPT | Mod: S$GLB,,, | Performed by: INTERNAL MEDICINE

## 2023-06-26 PROCEDURE — 99999 PR PBB SHADOW E&M-EST. PATIENT-LVL IV: CPT | Mod: PBBFAC,,, | Performed by: INTERNAL MEDICINE

## 2023-06-26 PROCEDURE — 3077F SYST BP >= 140 MM HG: CPT | Mod: CPTII,S$GLB,, | Performed by: INTERNAL MEDICINE

## 2023-06-26 PROCEDURE — 99999 PR PBB SHADOW E&M-EST. PATIENT-LVL IV: ICD-10-PCS | Mod: PBBFAC,,, | Performed by: INTERNAL MEDICINE

## 2023-06-26 PROCEDURE — 99215 PR OFFICE/OUTPT VISIT, EST, LEVL V, 40-54 MIN: ICD-10-PCS | Mod: 25,S$GLB,, | Performed by: INTERNAL MEDICINE

## 2023-06-26 RX ORDER — KETOROLAC TROMETHAMINE 30 MG/ML
60 INJECTION, SOLUTION INTRAMUSCULAR; INTRAVENOUS
Status: COMPLETED | OUTPATIENT
Start: 2023-06-26 | End: 2023-06-26

## 2023-06-26 RX ORDER — CLOBETASOL PROPIONATE 0.5 MG/G
OINTMENT TOPICAL 2 TIMES DAILY
Qty: 60 G | Refills: 5 | Status: SHIPPED | OUTPATIENT
Start: 2023-06-26 | End: 2023-12-23

## 2023-06-26 RX ORDER — SEMAGLUTIDE 0.68 MG/ML
0.25 INJECTION, SOLUTION SUBCUTANEOUS
Qty: 1.5 ML | Refills: 0 | Status: SHIPPED | OUTPATIENT
Start: 2023-06-26 | End: 2023-07-26

## 2023-06-26 RX ORDER — METHYLPREDNISOLONE ACETATE 80 MG/ML
80 INJECTION, SUSPENSION INTRA-ARTICULAR; INTRALESIONAL; INTRAMUSCULAR; SOFT TISSUE
Status: COMPLETED | OUTPATIENT
Start: 2023-06-26 | End: 2023-06-26

## 2023-06-26 RX ORDER — AMLODIPINE BESYLATE 10 MG/1
10 TABLET ORAL DAILY
Qty: 90 TABLET | Refills: 3 | Status: SHIPPED | OUTPATIENT
Start: 2023-06-26 | End: 2024-06-25

## 2023-06-26 RX ORDER — CEVIMELINE HYDROCHLORIDE 30 MG/1
30 CAPSULE ORAL 3 TIMES DAILY
Qty: 270 CAPSULE | Refills: 3 | Status: SHIPPED | OUTPATIENT
Start: 2023-06-26 | End: 2024-02-28 | Stop reason: SDUPTHER

## 2023-06-26 RX ORDER — METHYLPREDNISOLONE ACETATE 80 MG/ML
160 INJECTION, SUSPENSION INTRA-ARTICULAR; INTRALESIONAL; INTRAMUSCULAR; SOFT TISSUE
Status: DISCONTINUED | OUTPATIENT
Start: 2023-06-26 | End: 2023-06-26

## 2023-06-26 RX ORDER — SEMAGLUTIDE 0.68 MG/ML
0.5 INJECTION, SOLUTION SUBCUTANEOUS
Qty: 3 ML | Refills: 5 | Status: SHIPPED | OUTPATIENT
Start: 2023-06-26 | End: 2023-12-23

## 2023-06-26 RX ORDER — METHYLPREDNISOLONE 4 MG/1
8 TABLET ORAL DAILY
Qty: 180 TABLET | Refills: 1 | Status: SHIPPED | OUTPATIENT
Start: 2023-06-26 | End: 2023-12-23

## 2023-06-26 RX ORDER — TIZANIDINE 4 MG/1
4 TABLET ORAL EVERY 8 HOURS
Qty: 90 TABLET | Refills: 5 | Status: SHIPPED | OUTPATIENT
Start: 2023-06-26 | End: 2023-12-23

## 2023-06-26 RX ORDER — LEVOTHYROXINE SODIUM 125 UG/1
125 CAPSULE ORAL DAILY
Qty: 90 CAPSULE | Refills: 3
Start: 2023-06-26 | End: 2024-06-25

## 2023-06-26 RX ORDER — CYANOCOBALAMIN 1000 UG/ML
1000 INJECTION, SOLUTION INTRAMUSCULAR; SUBCUTANEOUS
Status: COMPLETED | OUTPATIENT
Start: 2023-06-26 | End: 2023-06-26

## 2023-06-26 RX ORDER — ATOMOXETINE 40 MG/1
CAPSULE ORAL
COMMUNITY
Start: 2023-01-10

## 2023-06-26 RX ORDER — TRAMADOL HYDROCHLORIDE 50 MG/1
50 TABLET ORAL EVERY 8 HOURS PRN
Qty: 90 EACH | Refills: 5 | Status: SHIPPED | OUTPATIENT
Start: 2023-06-26 | End: 2023-10-24

## 2023-06-26 RX ADMIN — METHYLPREDNISOLONE ACETATE 80 MG: 80 INJECTION, SUSPENSION INTRA-ARTICULAR; INTRALESIONAL; INTRAMUSCULAR; SOFT TISSUE at 04:06

## 2023-06-26 RX ADMIN — CYANOCOBALAMIN 1000 MCG: 1000 INJECTION, SOLUTION INTRAMUSCULAR; SUBCUTANEOUS at 04:06

## 2023-06-26 RX ADMIN — KETOROLAC TROMETHAMINE 60 MG: 30 INJECTION, SOLUTION INTRAMUSCULAR; INTRAVENOUS at 04:06

## 2023-06-26 ASSESSMENT — ROUTINE ASSESSMENT OF PATIENT INDEX DATA (RAPID3)
MDHAQ FUNCTION SCORE: 0.9
TOTAL RAPID3 SCORE: 2.33
PAIN SCORE: 1
PSYCHOLOGICAL DISTRESS SCORE: 1.1
PATIENT GLOBAL ASSESSMENT SCORE: 3
FATIGUE SCORE: 1.1

## 2023-06-26 NOTE — PROGRESS NOTES
2 pt identifiers.Verified allergies. Administered 1 cc ( 1000 mcg/ml ) of b12 to the right upper outer gluteal. Informed of s/s to report verbalized understanding. No adverse reactions noted.  Administered 1 cc ( 80 mg/ml ) of depomedrol to the right ventrogluteal. Informed of s/s to report verbalized understanding. No adverse reactions noted.  Administered 2 cc ( 30 mg/ml ) of toradol to the left upper outer gluteal. Patient tolerated injections well. Informed of s/s to report verbalized understanding. No adverse reactions noted. Left facility in stable condition.     MM-LPN

## 2023-06-26 NOTE — PROGRESS NOTES
Subjective:       Patient ID: Mack Melton is a 58 y.o. female.    Chief Complaint: Disease Management      Follow up: 58 year old female who presents to clinic for follow up on Sjogren's syndrome.  Her blood glucose,  elevated and she has taken metformin and  is poorly controlled. PSA otezla is working well. She is doing fair overall. She complains of joint pain involving her hands and ankles with morning stiffness lasting between 30min and 1 hour. Plaquenil is on hold due to low platelet count and bruising. She has increasing stiffness and pain/heaviness in her legs and cramping in her feet. She will have time getting up after periods of immobility. Medrol helps significantly with her arthritis--when she d/akosua this medication she has a significantly difficulty time moving. She has weakness in her legs at times when walking. She describes legs as heavy. X-ray of lumbar spine showed severe degenerative disc disease. She is requesting a medication to help with her severe pain.     Fatigue is unchanged--she has shift work sleep disorder--working nights. She takes provigil and adderall during the day. Ambien 2.5 mg at her bedtime prn.     Sicca sx are improved with evoxac.    We reviewed her recent labs.     Current tx:  1. Ibuprofen   2. Medrol 4 mg  3. otezla  3. Zanaflex      Review of Systems   Constitutional:  Positive for activity change and fatigue. Negative for appetite change and chills.   HENT:          Dry mouth   Eyes:  Negative for visual disturbance.        Dry eyes   Respiratory:  Negative for cough and shortness of breath.    Cardiovascular:  Negative for chest pain, palpitations and leg swelling.   Gastrointestinal:  Negative for abdominal pain, constipation, diarrhea, nausea and vomiting.   Musculoskeletal:  Positive for arthralgias, back pain, joint swelling, neck pain and neck stiffness.   Neurological:  Negative for dizziness, weakness and light-headedness.       Objective:     Vitals:    06/26/23  1414   BP: (!) 183/78   Pulse: 97       Past Medical History:   Diagnosis Date    Acid reflux     Arthritis     History of colon polyps     Hypertension     Sjogren's syndrome     Sleep apnea     not using CPAP    Thyroid cancer      Past Surgical History:   Procedure Laterality Date    COLONOSCOPY N/A 12/9/2020    Procedure: COLONOSCOPY;  Surgeon: Tonny Drew MD;  Location: [facility];  Service: Endoscopy;  Laterality: N/A;    THYROID SURGERY      UTERINE FIBROID SURGERY            Physical Exam   Constitutional: She is oriented to person, place, and time.   HENT:   Head: Normocephalic and atraumatic.   Mouth/Throat: Oropharynx is clear and moist.   Eyes: Pupils are equal, round, and reactive to light. Right conjunctiva is not injected. Left conjunctiva is not injected.   Neck: No JVD present. No thyromegaly present.   Cardiovascular: Normal rate, regular rhythm and normal heart sounds. Exam reveals no gallop, no friction rub and no decreased pulses.   No murmur heard.  Pulmonary/Chest: Breath sounds normal. She has no wheezes. She has no rhonchi. She has no rales. She exhibits no tenderness.   Abdominal: There is no abdominal tenderness. There is no rebound and no guarding.   Musculoskeletal:         General: Tenderness and deformity present.      Right shoulder: Normal.      Left shoulder: Normal.      Right elbow: Normal.      Left elbow: Normal.      Right wrist: Normal.      Left wrist: Normal.      Cervical back: Neck supple.      Right knee: Normal.      Left knee: Normal.   Lymphadenopathy:     She has no cervical adenopathy.   Neurological: She is alert and oriented to person, place, and time. She has normal sensation. Gait normal.   Reflex Scores:       Patellar reflexes are 3+ on the right side and 3+ on the left side.  Skin: No rash noted. No erythema. No pallor.   Psychiatric: Mood and affect normal. Her mood appears not anxious.   Vitals reviewed.      Right Side Rheumatological Exam     Examination  finds the shoulder, elbow, wrist, knee, 1st PIP, 1st MCP, 2nd MCP, 3rd MCP, 4th MCP and 5th MCP normal.    The patient is tender to palpation of the 1st MCP, 2nd PIP, 2nd MCP, 3rd PIP, 3rd MCP, 4th PIP, 4th MCP, 5th PIP and 5th MCP    She has swelling of the 1st MCP, 2nd MCP, 3rd MCP, 4th MCP and 5th MCP    The patient has an enlarged wrist, 1st PIP, 2nd PIP, 3rd PIP, 4th PIP and 5th PIP    Shoulder Exam   Tenderness Location: acromion    Range of Motion   Active abduction:  abnormal   Adduction: abnormal  Sensation: normal    Knee Exam   Tenderness Location: medial joint line  Patellofemoral Crepitus: positive  Sensation: normal    Hip Exam   Tenderness Location: no tenderness  Sensation: normal    Elbow/Wrist Exam   Tenderness Location: no tenderness  Sensation: normal    Left Side Rheumatological Exam     Examination finds the shoulder, elbow, wrist, knee, 1st PIP, 1st MCP, 2nd PIP, 2nd MCP, 3rd PIP, 3rd MCP, 4th PIP, 4th MCP, 5th PIP and 5th MCP normal.    The patient is tender to palpation of the 1st MCP, 2nd MCP, 3rd MCP, 4th MCP and 5th MCP.    The patient has an enlarged wrist, 1st PIP, 2nd PIP, 3rd PIP, 4th PIP and 5th PIP.    Shoulder Exam   Tenderness Location: acromion    Range of Motion   Active abduction:  abnormal   Sensation: normal    Knee Exam   Tenderness Location: lateral joint line and medial joint line    Patellofemoral Crepitus: positive  Sensation: normal    Hip Exam   Tenderness Location: no tenderness  Sensation: normal    Elbow/Wrist Exam   Sensation: normal      Back/Neck Exam   General Inspection   Gait: normal       Tenderness Right paramedian tenderness of the Upper L-Spine, Lower L-Spine and SI Joint.Left paramedian tenderness of the Upper L-Spine, Lower L-Spine and SI Joint.         Collected 4/25/2023 10:48          Component Ref Range & Units 2 mo ago   Hemoglobin A1C 4.2 - 5.8 % 6.3 High     EAG (mg/dl)  123.79         View Full Report                 Contains abnormal data  Thyroglobulin Panel  Order: [id number]   suggestion  Information displayed in this report may not trend or trigger automated decision support.      Ref Range & Units 2 mo ago   Thyroglobulin Ab < or = 1 IU/mL <1    Thyroglobulin ng/mL 0.2 Low     Note:        Reference Range:         Intact Thyroid   2.8-40.9         Athyrotic        <0.1                  Note: Abnormal flagging is based         on the reference interval for         patients with intact thyroid.              This test was performed using the Monica Minotola   chemiluminescent method. Values obtained from   different assay methods cannot be used   interchangeably. Thyroglobulin levels, regardless   of value, should not be interpreted as absolute   evidence of the presence or absence of disease.       For additional information, please refer to   [email or web address]   (This link is being provided for informational/   educational purposes only.)        Ref Range & Units 2 mo ago   Cholesterol 140 - 200 mg/dL 167    Triglycerides 35 - 150 mg/dL 245 H High     Note:  The reference ranges are for fasting patient results. No reference ranges have been established for non-fasting tests.    Note:   SLIGHT LIPEMIA    HDL >50 mg/dL 65    Cholesterol.in LDL - Non Fasting 60 - 135 53 Low     Hdl/Cholesterol Ratio 0.00 - 4.45 2.57    Non HDL Cholesterol mg/dL 102.00    Narrative    The goal NON HDL is less than 30 mg/dL above the LDL goal for the patient  Specimen Collected: 04/25/23 10:48 Last Resulted: 04/25/23 13:21   Received From: [facility]sidi[facility] and Affili[facility]  Result Received: 06/26/23 13:42    Received Information  T3, FREE  Order: [id number]   suggestion  Result Information displayed in this report will not trend and may not trigger automated decision support.      Ref Range & Units 2 mo ago   T3, Free 0.25 - 0.43 ng/dL 0.26    Specimen Collected: 04/25/23 10:48 Last  Resulted: 04/25/23 15:55   Received From: [facility]sidiaries and Affili[facility]  Result Received: 06/26/23 13:42    Received Information   Contains abnormal data TSH  Order: [id number]  Collected 4/25/2023 10:48          Component Ref Range & Units 2 mo ago   TSH 0.27 - 4.20 uIU/mL 0.02 Low          View Full Report     Narrative    Interpretation for Females:   Non-pregnant: 0.27-4.2 mIU/ml   First trimester: 0.27-2.5 mIU/ml   Second Trimester: 0.27-3.0 mIU/ml   Third Trimester: 0.27-3.5 mIU/ml              T4, FREE  Order: [id number]  Collected 4/25/2023 10:48          Component Ref Range & Units 2 mo ago   T4, Free 0.93 - 1.70 ng/dL 1.33         View Full Report                 Contains abnormal data VITAMIN B12  Order: [id number]  Collected 4/25/2023 10:48          Component Ref Range & Units 2 mo ago   Vitamin B-12 232 - 1,245 pg/mL 1,712 High          View Full Report                 Contains abnormal data BASIC METABOLIC PANEL  Order: [id number]   suggestion  Result Information displayed in this report will not trend and may not trigger automated decision support.      Ref Range & Units 2 mo ago   Glucose <100 mg/dL 117 High     Blood Urea Nitrogen 6 - 22 mg/dL 21    Creatinine 0.50 - 1.20 mg/dL 1.36 High     Calcium 8.4 - 10.5 mg/dL 9.0    Sodium 134 - 146 meq/L 142    Chloride 98.0 - 111.0 meq/dL 105.0    Potassium 3.60 - 5.30 meq/L 3.90    CO2 TOTAL 20 - 34 meq/L 26    EGFR mL/min/1.73m^2 45    Narrative    \      Assessment:       1. Type 2 diabetes mellitus with other specified complication, without long-term current use of insulin    2. PSA (psoriatic arthritis)    3. Sjogren's syndrome, with unspecified organ involvement    4. Seronegative rheumatoid arthritis    5. Abnormal SPEP    6. Chronic ITP (idiopathic thrombocytopenia)    7. Primary osteoarthritis of left knee    8. Chronic pain syndrome    9. Hypertension, unspecified type                Plan:        Type 2 diabetes mellitus with other specified complication, without long-term current use of insulin  -     clobetasol 0.05% (TEMOVATE) 0.05 % Oint; Apply topically 2 (two) times daily.  Dispense: 60 g; Refill: 5  -     semaglutide (OZEMPIC) 0.25 mg or 0.5 mg (2 mg/3 mL) pen injector; Inject 0.25 mg into the skin every 7 days.  Dispense: 1.5 mL; Refill: 0  -     semaglutide (OZEMPIC) 0.25 mg or 0.5 mg (2 mg/3 mL) pen injector; Inject 0.5 mg into the skin every 7 days.  Dispense: 3 mL; Refill: 5  -     Sedimentation rate; Future; Expected date: 06/26/2023  -     C-Reactive Protein; Future; Expected date: 06/26/2023  -     Comprehensive Metabolic Panel; Future; Expected date: 06/26/2023  -     CBC Auto Differential; Future; Expected date: 06/26/2023  -     ketorolac injection 60 mg  -     Discontinue: methylPREDNISolone acetate injection 160 mg  -     cyanocobalamin injection 1,000 mcg  -     traMADoL (ULTRAM) 50 mg tablet; Take 1 tablet (50 mg total) by mouth every 8 (eight) hours as needed for Pain.  Dispense: 90 each; Refill: 5  -     methylPREDNISolone acetate injection 80 mg  -     Sedimentation rate; Future; Expected date: 06/26/2023  -     Comprehensive Metabolic Panel; Future; Expected date: 06/26/2023  -     C-Reactive Protein; Future; Expected date: 06/26/2023  -     CBC Auto Differential; Future; Expected date: 06/26/2023    PSA (psoriatic arthritis)  -     clobetasol 0.05% (TEMOVATE) 0.05 % Oint; Apply topically 2 (two) times daily.  Dispense: 60 g; Refill: 5  -     Sedimentation rate; Future; Expected date: 06/26/2023  -     C-Reactive Protein; Future; Expected date: 06/26/2023  -     Comprehensive Metabolic Panel; Future; Expected date: 06/26/2023  -     CBC Auto Differential; Future; Expected date: 06/26/2023  -     ketorolac injection 60 mg  -     Discontinue: methylPREDNISolone acetate injection 160 mg  -     cyanocobalamin injection 1,000 mcg  -     traMADoL (ULTRAM) 50 mg tablet; Take 1 tablet (50  mg total) by mouth every 8 (eight) hours as needed for Pain.  Dispense: 90 each; Refill: 5  -     methylPREDNISolone acetate injection 80 mg  -     Sedimentation rate; Future; Expected date: 06/26/2023  -     Comprehensive Metabolic Panel; Future; Expected date: 06/26/2023  -     C-Reactive Protein; Future; Expected date: 06/26/2023  -     CBC Auto Differential; Future; Expected date: 06/26/2023    Sjogren's syndrome, with unspecified organ involvement  -     ketorolac injection 60 mg  -     Discontinue: methylPREDNISolone acetate injection 160 mg  -     cyanocobalamin injection 1,000 mcg  -     traMADoL (ULTRAM) 50 mg tablet; Take 1 tablet (50 mg total) by mouth every 8 (eight) hours as needed for Pain.  Dispense: 90 each; Refill: 5  -     methylPREDNISolone acetate injection 80 mg  -     Sedimentation rate; Future; Expected date: 06/26/2023  -     Comprehensive Metabolic Panel; Future; Expected date: 06/26/2023  -     C-Reactive Protein; Future; Expected date: 06/26/2023  -     CBC Auto Differential; Future; Expected date: 06/26/2023    Seronegative rheumatoid arthritis  -     ketorolac injection 60 mg  -     Discontinue: methylPREDNISolone acetate injection 160 mg  -     cyanocobalamin injection 1,000 mcg  -     traMADoL (ULTRAM) 50 mg tablet; Take 1 tablet (50 mg total) by mouth every 8 (eight) hours as needed for Pain.  Dispense: 90 each; Refill: 5  -     methylPREDNISolone acetate injection 80 mg  -     Sedimentation rate; Future; Expected date: 06/26/2023  -     Comprehensive Metabolic Panel; Future; Expected date: 06/26/2023  -     C-Reactive Protein; Future; Expected date: 06/26/2023  -     CBC Auto Differential; Future; Expected date: 06/26/2023  -     levothyroxine (TIROSINT) 125 mcg Cap; Take 125 mcg by mouth once daily.  Dispense: 90 capsule; Refill: 3    Abnormal SPEP  -     ketorolac injection 60 mg  -     Discontinue: methylPREDNISolone acetate injection 160 mg  -     cyanocobalamin injection 1,000  mcg  -     traMADoL (ULTRAM) 50 mg tablet; Take 1 tablet (50 mg total) by mouth every 8 (eight) hours as needed for Pain.  Dispense: 90 each; Refill: 5  -     methylPREDNISolone acetate injection 80 mg  -     Sedimentation rate; Future; Expected date: 06/26/2023  -     Comprehensive Metabolic Panel; Future; Expected date: 06/26/2023  -     C-Reactive Protein; Future; Expected date: 06/26/2023  -     CBC Auto Differential; Future; Expected date: 06/26/2023    Chronic ITP (idiopathic thrombocytopenia)  -     ketorolac injection 60 mg  -     Discontinue: methylPREDNISolone acetate injection 160 mg  -     cyanocobalamin injection 1,000 mcg  -     traMADoL (ULTRAM) 50 mg tablet; Take 1 tablet (50 mg total) by mouth every 8 (eight) hours as needed for Pain.  Dispense: 90 each; Refill: 5  -     methylPREDNISolone acetate injection 80 mg  -     Sedimentation rate; Future; Expected date: 06/26/2023  -     Comprehensive Metabolic Panel; Future; Expected date: 06/26/2023  -     C-Reactive Protein; Future; Expected date: 06/26/2023  -     CBC Auto Differential; Future; Expected date: 06/26/2023    Primary osteoarthritis of left knee  -     ketorolac injection 60 mg  -     Discontinue: methylPREDNISolone acetate injection 160 mg  -     cyanocobalamin injection 1,000 mcg  -     traMADoL (ULTRAM) 50 mg tablet; Take 1 tablet (50 mg total) by mouth every 8 (eight) hours as needed for Pain.  Dispense: 90 each; Refill: 5  -     methylPREDNISolone acetate injection 80 mg  -     Sedimentation rate; Future; Expected date: 06/26/2023  -     Comprehensive Metabolic Panel; Future; Expected date: 06/26/2023  -     C-Reactive Protein; Future; Expected date: 06/26/2023  -     CBC Auto Differential; Future; Expected date: 06/26/2023  -     levothyroxine (TIROSINT) 125 mcg Cap; Take 125 mcg by mouth once daily.  Dispense: 90 capsule; Refill: 3    Chronic pain syndrome  -     traMADoL (ULTRAM) 50 mg tablet; Take 1 tablet (50 mg total) by mouth  every 8 (eight) hours as needed for Pain.  Dispense: 90 each; Refill: 5  -     Sedimentation rate; Future; Expected date: 06/26/2023  -     Comprehensive Metabolic Panel; Future; Expected date: 06/26/2023  -     C-Reactive Protein; Future; Expected date: 06/26/2023  -     CBC Auto Differential; Future; Expected date: 06/26/2023    Hypertension, unspecified type  -     amLODIPine (NORVASC) 10 MG tablet; Take 1 tablet (10 mg total) by mouth once daily.  Dispense: 90 tablet; Refill: 3            Assessment:  57 year old female with  Sjogren's syndrome (+SSA, SIENA 1:640)  --chronic ITP, stable   --Hx of thyroid cancer 2007 s/p total thyroidectomy [place] followed by Dr. Mansfield in [place] (no medullary ca)  --elevated CK  --hyperglycemia, HgbA1c 6.3%  --plaquenil (low platelet/bruising)    Plan:  1. toradol 60, depo 80, b12 today  2. Cont medrol PRN, evoxac prn  3. Ozempic  start dc metformin and   4. Lidoderm refill  5. Continue otezla  6. Referral to pain management for severe degenerative disc disease  7. Tramadol pended  Pain contract signed.  I have checked [place] prescription monitoring program site and no unusual or abnormal behavior has occurred pt understand the risk and benefits of taking opioid medications and has decided to continue the medication.      More than 50% of the  40 minute encounter was spent face to face counseling the patient regarding current status and future plan of care as well as side effects  of the medications. All questions were answered to patient's satisfaction also includes  non-face to face time preparing to see the patient (eg, review of tests), Obtaining and/or reviewing separately obtained history, Documenting clinical information in the electronic or other health record, Independently interpreting results

## 2023-07-20 ENCOUNTER — PATIENT MESSAGE (OUTPATIENT)
Dept: PHARMACY | Facility: CLINIC | Age: 58
End: 2023-07-20
Payer: COMMERCIAL

## 2023-07-20 ENCOUNTER — SPECIALTY PHARMACY (OUTPATIENT)
Dept: PHARMACY | Facility: CLINIC | Age: 58
End: 2023-07-20
Payer: COMMERCIAL

## 2023-07-20 NOTE — TELEPHONE ENCOUNTER
Specialty Pharmacy - Refill Coordination    Specialty Medication Orders Linked to Encounter      Flowsheet Row Most Recent Value   Medication #1 apremilast (OTEZLA) 30 mg Tab (Order#[id number], Rx#[id number])            Refill Questions - Documented Responses      Flowsheet Row Most Recent Value   Refill Screening Questions    Changes to allergies? No   Changes to medications? No   New conditions since last clinic visit? No   Unplanned office visit, urgent care, ED, or hospital admission in the last 4 weeks? No   How does patient/caregiver feel medication is working? Good   Financial problems or insurance changes? No   How many doses of your specialty medications were missed in the last 4 weeks? 1   Why were doses missed? Away from home   Would patient like to speak to a pharmacist? No   When does the patient need to receive the medication? 07/27/23   Refill Delivery Questions    How will the patient receive the medication? [facility]   When does the patient need to receive the medication? 07/27/23   Shipping Address Home   Address in [facility] confirmed and updated if neccessary? Yes   Expected Copay ($) 0   Is the patient able to afford the medication copay? Yes   Payment Method zero copay   Days supply of Refill 28   Supplies needed? No supplies needed   Refill activity completed? Yes   Refill activity plan Refill scheduled   Shipment/Pickup Date: 07/25/23            Current Outpatient Medications   Medication Sig    amLODIPine (NORVASC) 10 MG tablet Take 1 tablet (10 mg total) by mouth once daily.    apremilast (OTEZLA) 30 mg Tab Take 1 tablet (30 mg total) by mouth 2 (two) times daily.    atomoxetine (STRATTERA) 40 MG capsule     buPROPion (WELLBUTRIN XL) 150 MG TB24 tablet Take 150 mg by mouth every morning.    cevimeline (EVOXAC) 30 mg capsule Take 1 capsule (30 mg total) by mouth 3 (three) times daily.    cholecalciferol, vitamin D3, 5,000 unit Tab Take by mouth.    clobetasol 0.05% (TEMOVATE) 0.05 % Oint  Apply topically 2 (two) times daily.    co-enzyme Q-10 30 mg capsule Take 30 mg by mouth.    cyanocobalamin 1,000 mcg/mL injection INJECT ONE ML IN THE MUSCLE WEEKLY    estrogen, conjugated,-medroxyprogesterone (PREMPRO) 0.625-5 mg per tablet Take 1 tablet by mouth every morning.    fluticasone propionate (FLONASE) 50 mcg/actuation nasal spray 2 sprays by Nasal route.    folic acid (FOLVITE) 1 MG tablet TAKE ONE TABLET BY MOUTH DAILY    levothyroxine (TIROSINT) 125 mcg Cap Take 125 mcg by mouth once daily.    LIDOcaine (LIDODERM) 5 % Place 1 patch onto the skin once daily. Remove & Discard patch within 12 hours or as directed by MD    liothyronine (CYTOMEL) 5 MCG Tab TAKE ONE TABLET BY MOUTH TWICE DAILY    methylPREDNISolone (MEDROL) 4 MG Tab Take 2 tablets (8 mg total) by mouth once daily.    methylPREDNISolone (MEDROL) 4 MG Tab Take 2 tablets (8 mg total) by mouth once daily.    modafiniL (PROVIGIL) 200 MG Tab Take 1 tablet (200 mg total) by mouth once daily.    norethindrone-ethinyl estradiol (FEMHRT 1/5) 1-5 mg-mcg Tab     pantoprazole (PROTONIX) 40 MG tablet Take 40 mg by mouth once daily.    semaglutide (OZEMPIC) 0.25 mg or 0.5 mg (2 mg/3 mL) pen injector Inject 0.25 mg into the skin every 7 days.    semaglutide (OZEMPIC) 0.25 mg or 0.5 mg (2 mg/3 mL) pen injector Inject 0.5 mg into the skin every 7 days.    terazosin (HYTRIN) 2 MG capsule Take 2 mg by mouth.    tiZANidine (ZANAFLEX) 4 MG tablet Take 1 tablet (4 mg total) by mouth every 8 (eight) hours as needed.    tiZANidine (ZANAFLEX) 4 MG tablet Take 1 tablet (4 mg total) by mouth every 8 (eight) hours.    traMADoL (ULTRAM) 50 mg tablet Take 1 tablet (50 mg total) by mouth every 8 (eight) hours as needed for Pain.    traMADoL (ULTRAM) 50 mg tablet Take 1 tablet (50 mg total) by mouth every 8 (eight) hours as needed for Pain.    zolpidem (AMBIEN) 5 MG Tab Take 1 tablet (5 mg total) by mouth nightly as needed (insomnia).   Last reviewed on 7/2/2023  2:05 PM  by Rodo Harding MD    Review of patient's allergies indicates:  No Known Allergies Last reviewed on  7/2/2023 2:05 PM by Rodo Harding      Tasks added this encounter   No tasks added.   Tasks due within next 3 months   8/9/2023 - Clinical Assessment (1 year recurrence)     Rosanna Ford, PharmD  [facility]  [address] [place] [id number]  Phone: [phone]  Fax: [phone]

## 2023-07-24 ENCOUNTER — PATIENT MESSAGE (OUTPATIENT)
Dept: RESEARCH | Facility: HOSPITAL | Age: 58
End: 2023-07-24
Payer: COMMERCIAL

## 2023-07-31 ENCOUNTER — PATIENT MESSAGE (OUTPATIENT)
Dept: RESEARCH | Facility: HOSPITAL | Age: 58
End: 2023-07-31
Payer: COMMERCIAL

## 2023-08-18 ENCOUNTER — SPECIALTY PHARMACY (OUTPATIENT)
Dept: PHARMACY | Facility: CLINIC | Age: 58
End: 2023-08-18
Payer: COMMERCIAL

## 2023-08-18 DIAGNOSIS — L40.50 PSORIATIC ARTHRITIS: Primary | ICD-10-CM

## 2023-08-18 NOTE — TELEPHONE ENCOUNTER
Specialty Pharmacy - Clinical Reassessment    Specialty Medication Orders Linked to Encounter      Flowsheet Row Most Recent Value   Medication #1 apremilast (OTEZLA) 30 mg Tab (Order#[id number], Rx#[id number])          Patient Diagnosis   L40.50 - Psoriatic arthritis    Mack Melton is a 58 y.o. female, who is followed by the specialty pharmacy service for management and education of her PsA.  She has been on therapy with Otezla for 9 months.  I have reviewed her electronic medical record and current medication list and determined that specialty medication adjustment Is not needed at this time.    Patient has not experienced adverse events.  She Is adherent reporting 1 missed doses since last review.  Adherence has been encouraged with the following mechanism(s): proactive refill calls.  She is meeting goals of therapy and will continue treatment.        7/20/2023 6/21/2023 5/26/2023 5/1/2023 3/14/2023 2/15/2023 1/10/2023   Follow Up Review   # of missed doses 1    0  1 1 0 2 0   Reason Away from home  Simply forgot Simply forgot  Busy with other things    New Medications? No No No No No No No   New Conditions? No No No No No No No   New Allergies? No No No No No No No   Med Effective? Good Very good Good Good Good Excellent Good   Urgent Care? No No No No No No No   Requested Pharmacist? No No No No No No No         Therapy is appropriate to continue.    Therapy is effective: Yes  On scale of 1 to 10, how does patient rank quality of life? (10 - Best): Unable to Assess  Recommendations: none at this time.  Review Method: Chart Review    Tasks added this encounter   No tasks added.   Tasks due within next 3 months   8/9/2023 - Clinical Assessment (1 year recurrence)  8/17/2023 - Refill Coordination Outreach (1 time occurrence)     Jenelle Covington, PharmD  [facility]  [address] [facility] [address] [address]  Phone: [phone]  Fax: [phone]

## 2023-08-21 ENCOUNTER — PATIENT MESSAGE (OUTPATIENT)
Dept: PHARMACY | Facility: CLINIC | Age: 58
End: 2023-08-21
Payer: COMMERCIAL

## 2023-08-29 DIAGNOSIS — M47.817 LUMBAR AND SACRAL ARTHRITIS: ICD-10-CM

## 2023-08-29 DIAGNOSIS — D69.6 THROMBOCYTOPENIA: ICD-10-CM

## 2023-08-29 DIAGNOSIS — G60.3 IDIOPATHIC PROGRESSIVE NEUROPATHY: ICD-10-CM

## 2023-08-29 DIAGNOSIS — M02.30 REITER'S DISEASE: ICD-10-CM

## 2023-08-29 DIAGNOSIS — M35.09 SJOGREN'S SYNDROME WITH OTHER ORGAN INVOLVEMENT: ICD-10-CM

## 2023-08-29 DIAGNOSIS — M35.00 SJOGREN'S SYNDROME, WITH UNSPECIFIED ORGAN INVOLVEMENT: ICD-10-CM

## 2023-08-31 RX ORDER — CYANOCOBALAMIN 1000 UG/ML
INJECTION, SOLUTION INTRAMUSCULAR; SUBCUTANEOUS
Qty: 30 ML | Refills: 4 | Status: SHIPPED | OUTPATIENT
Start: 2023-08-31

## 2023-09-05 NOTE — TELEPHONE ENCOUNTER
----- Message from Jessica Curran sent at 7/15/2020  3:55 PM CDT -----  Regarding: appointment  Contact: patient  Type:  Patient Returning Call    Who Called:  self  Who Left Message for Patient:    Does the patient know what this is regarding?:  yes  Best Call Back Number: [phone]  Additional Information:  Patient states she received a call regarding her appointment tomorrow. She does not want a virtual appointment due to she would like to get an injection. Please call patient. Thanks!          
addressed  
05-Sep-2023 19:56

## 2023-10-30 ENCOUNTER — OFFICE VISIT (OUTPATIENT)
Dept: RHEUMATOLOGY | Facility: CLINIC | Age: 58
End: 2023-10-30
Payer: COMMERCIAL

## 2023-10-30 VITALS
HEART RATE: 93 BPM | BODY MASS INDEX: 35.07 KG/M2 | DIASTOLIC BLOOD PRESSURE: 77 MMHG | WEIGHT: 224 LBS | SYSTOLIC BLOOD PRESSURE: 139 MMHG

## 2023-10-30 DIAGNOSIS — G89.4 CHRONIC PAIN SYNDROME: ICD-10-CM

## 2023-10-30 DIAGNOSIS — M35.00 SJOGREN'S SYNDROME, WITH UNSPECIFIED ORGAN INVOLVEMENT: ICD-10-CM

## 2023-10-30 DIAGNOSIS — L40.50 PSA (PSORIATIC ARTHRITIS): Primary | ICD-10-CM

## 2023-10-30 DIAGNOSIS — Z79.52 CURRENT CHRONIC USE OF SYSTEMIC STEROIDS: ICD-10-CM

## 2023-10-30 PROCEDURE — 99214 OFFICE O/P EST MOD 30 MIN: CPT | Mod: 25,S$GLB,, | Performed by: PHYSICIAN ASSISTANT

## 2023-10-30 PROCEDURE — 3008F BODY MASS INDEX DOCD: CPT | Mod: CPTII,S$GLB,, | Performed by: PHYSICIAN ASSISTANT

## 2023-10-30 PROCEDURE — 3044F PR MOST RECENT HEMOGLOBIN A1C LEVEL <7.0%: ICD-10-PCS | Mod: CPTII,S$GLB,, | Performed by: PHYSICIAN ASSISTANT

## 2023-10-30 PROCEDURE — 3075F SYST BP GE 130 - 139MM HG: CPT | Mod: CPTII,S$GLB,, | Performed by: PHYSICIAN ASSISTANT

## 2023-10-30 PROCEDURE — 3075F PR MOST RECENT SYSTOLIC BLOOD PRESS GE 130-139MM HG: ICD-10-PCS | Mod: CPTII,S$GLB,, | Performed by: PHYSICIAN ASSISTANT

## 2023-10-30 PROCEDURE — 1159F MED LIST DOCD IN RCRD: CPT | Mod: CPTII,S$GLB,, | Performed by: PHYSICIAN ASSISTANT

## 2023-10-30 PROCEDURE — 96372 PR INJECTION,THERAP/PROPH/DIAG2ST, IM OR SUBCUT: ICD-10-PCS | Mod: S$GLB,,, | Performed by: PHYSICIAN ASSISTANT

## 2023-10-30 PROCEDURE — 1160F RVW MEDS BY RX/DR IN RCRD: CPT | Mod: CPTII,S$GLB,, | Performed by: PHYSICIAN ASSISTANT

## 2023-10-30 PROCEDURE — 1159F PR MEDICATION LIST DOCUMENTED IN MEDICAL RECORD: ICD-10-PCS | Mod: CPTII,S$GLB,, | Performed by: PHYSICIAN ASSISTANT

## 2023-10-30 PROCEDURE — 3078F PR MOST RECENT DIASTOLIC BLOOD PRESSURE < 80 MM HG: ICD-10-PCS | Mod: CPTII,S$GLB,, | Performed by: PHYSICIAN ASSISTANT

## 2023-10-30 PROCEDURE — 1160F PR REVIEW ALL MEDS BY PRESCRIBER/CLIN PHARMACIST DOCUMENTED: ICD-10-PCS | Mod: CPTII,S$GLB,, | Performed by: PHYSICIAN ASSISTANT

## 2023-10-30 PROCEDURE — 99214 PR OFFICE/OUTPT VISIT, EST, LEVL IV, 30-39 MIN: ICD-10-PCS | Mod: 25,S$GLB,, | Performed by: PHYSICIAN ASSISTANT

## 2023-10-30 PROCEDURE — 3044F HG A1C LEVEL LT 7.0%: CPT | Mod: CPTII,S$GLB,, | Performed by: PHYSICIAN ASSISTANT

## 2023-10-30 PROCEDURE — 96372 THER/PROPH/DIAG INJ SC/IM: CPT | Mod: S$GLB,,, | Performed by: PHYSICIAN ASSISTANT

## 2023-10-30 PROCEDURE — 99999 PR PBB SHADOW E&M-EST. PATIENT-LVL IV: ICD-10-PCS | Mod: PBBFAC,,, | Performed by: PHYSICIAN ASSISTANT

## 2023-10-30 PROCEDURE — 3008F PR BODY MASS INDEX (BMI) DOCUMENTED: ICD-10-PCS | Mod: CPTII,S$GLB,, | Performed by: PHYSICIAN ASSISTANT

## 2023-10-30 PROCEDURE — 3078F DIAST BP <80 MM HG: CPT | Mod: CPTII,S$GLB,, | Performed by: PHYSICIAN ASSISTANT

## 2023-10-30 PROCEDURE — 99999 PR PBB SHADOW E&M-EST. PATIENT-LVL IV: CPT | Mod: PBBFAC,,, | Performed by: PHYSICIAN ASSISTANT

## 2023-10-30 RX ORDER — METHYLPREDNISOLONE ACETATE 80 MG/ML
80 INJECTION, SUSPENSION INTRA-ARTICULAR; INTRALESIONAL; INTRAMUSCULAR; SOFT TISSUE
Status: COMPLETED | OUTPATIENT
Start: 2023-10-30 | End: 2023-10-30

## 2023-10-30 RX ORDER — LORAZEPAM 1 MG/1
TABLET ORAL
COMMUNITY
Start: 2023-10-04 | End: 2023-10-30

## 2023-10-30 RX ORDER — FLUCONAZOLE 150 MG/1
150 TABLET ORAL
COMMUNITY
Start: 2023-10-20 | End: 2023-10-30 | Stop reason: ALTCHOICE

## 2023-10-30 RX ORDER — HYDROCODONE BITARTRATE AND ACETAMINOPHEN 5; 325 MG/1; MG/1
1 TABLET ORAL EVERY 4 HOURS PRN
COMMUNITY
Start: 2023-09-07 | End: 2023-10-30

## 2023-10-30 RX ORDER — KETOROLAC TROMETHAMINE 30 MG/ML
60 INJECTION, SOLUTION INTRAMUSCULAR; INTRAVENOUS
Status: COMPLETED | OUTPATIENT
Start: 2023-10-30 | End: 2023-10-30

## 2023-10-30 RX ORDER — CYANOCOBALAMIN 1000 UG/ML
1000 INJECTION, SOLUTION INTRAMUSCULAR; SUBCUTANEOUS
Status: COMPLETED | OUTPATIENT
Start: 2023-10-30 | End: 2023-10-30

## 2023-10-30 RX ORDER — OXYCODONE AND ACETAMINOPHEN 5; 325 MG/1; MG/1
1 TABLET ORAL EVERY 6 HOURS PRN
COMMUNITY
Start: 2023-10-13 | End: 2023-10-30

## 2023-10-30 RX ADMIN — KETOROLAC TROMETHAMINE 60 MG: 30 INJECTION, SOLUTION INTRAMUSCULAR; INTRAVENOUS at 03:10

## 2023-10-30 RX ADMIN — METHYLPREDNISOLONE ACETATE 80 MG: 80 INJECTION, SUSPENSION INTRA-ARTICULAR; INTRALESIONAL; INTRAMUSCULAR; SOFT TISSUE at 03:10

## 2023-10-30 RX ADMIN — CYANOCOBALAMIN 1000 MCG: 1000 INJECTION, SOLUTION INTRAMUSCULAR; SUBCUTANEOUS at 03:10

## 2023-10-30 ASSESSMENT — ROUTINE ASSESSMENT OF PATIENT INDEX DATA (RAPID3)
PSYCHOLOGICAL DISTRESS SCORE: 2.2
PAIN SCORE: 3
MDHAQ FUNCTION SCORE: 1.1
FATIGUE SCORE: 1.1
TOTAL RAPID3 SCORE: 3.55
PATIENT GLOBAL ASSESSMENT SCORE: 4

## 2023-10-30 NOTE — PROGRESS NOTES
"Subjective:       Patient ID: Mack Melton is a 58 y.o. female.    Chief Complaint: Disease Management    Mrs. Melton is a 58 year old female who presents to clinic for follow up on Sjogren's syndrome. She is doing fair overall. Joint pain in her hands and feet has improved significantly on otezla. She does have low back pain and feels like her "body is pulling her down" at times and she is unable to stand for prolonged time periods. X-ray lumbar spine showed severe degenerative changes. Not interested in pain management or PT at this time.     She has lost 15 lbs since starting ozempic. No s/e reported. Taking medrol 4 mg daily and increases to 8 mg if having a flare. Taking tramadol prn for severe pain.     She had a fall since last visit and fractured 5th metatarsal--recovered with conservative management. She believes DEXA is up to date with primary care.     We reviewed her recent labs from Endocrinology. Needs additional labs.    Current tx:  1. Ibuprofen   2. Medrol 4 mg  3. Zanaflex  4. otezla      Review of Systems   Constitutional:  Positive for activity change and fatigue. Negative for appetite change, chills and fever.   HENT:          Dry mouth   Eyes:  Negative for visual disturbance.        Dry eyes   Respiratory:  Negative for cough and shortness of breath.    Cardiovascular:  Negative for chest pain, palpitations and leg swelling.   Gastrointestinal:  Negative for abdominal pain, constipation, diarrhea, nausea and vomiting.   Musculoskeletal:  Positive for arthralgias, back pain, joint swelling, neck pain and neck stiffness.   Neurological:  Negative for dizziness, weakness, light-headedness and headaches.         Objective:     Vitals:    10/30/23 1431   BP: 139/77   Pulse: 93       Past Medical History:   Diagnosis Date    Acid reflux     Arthritis     History of colon polyps     Hypertension     Sjogren's syndrome     Sleep apnea     not using CPAP    Thyroid cancer      Past Surgical History: "   Procedure Laterality Date    COLONOSCOPY N/A 12/9/2020    Procedure: COLONOSCOPY;  Surgeon: Tonny Drew MD;  Location: [facility];  Service: Endoscopy;  Laterality: N/A;    THYROID SURGERY      UTERINE FIBROID SURGERY            Physical Exam   Eyes: Right conjunctiva is not injected. Left conjunctiva is not injected.   Neck: No JVD present. No thyromegaly present.   Cardiovascular: Normal rate and regular rhythm. Exam reveals no decreased pulses.   Pulmonary/Chest: Effort normal.   Musculoskeletal:      Right shoulder: Normal.      Left shoulder: Normal.      Right elbow: Normal.      Left elbow: Normal.      Right wrist: Normal.      Left wrist: Normal.      Right knee: Normal.      Left knee: Normal.      Right ankle: Swelling present. Tenderness present.      Left ankle: Swelling present. Tenderness present.   Lymphadenopathy:     She has no cervical adenopathy.   Neurological: Gait normal.   Skin: Rash (hyperpigmentation on the elbow, peeling on the bottom of the foot) noted.   Psychiatric: Mood and affect normal. Her mood appears not anxious.       Right Side Rheumatological Exam     Examination finds the shoulder, elbow, wrist and knee normal.    The patient is tender to palpation of the 1st PIP, 1st MCP, 2nd PIP, 2nd MCP, 3rd PIP, 3rd MCP, 4th PIP, 4th MCP, 5th PIP, 5th MCP and ankle    She has swelling of the 1st MCP, 2nd PIP, 2nd MCP, 3rd PIP, 3rd MCP, 4th PIP, 4th MCP, 5th PIP, 5th MCP and ankle    Left Side Rheumatological Exam     Examination finds the shoulder, elbow, wrist and knee normal.    The patient is tender to palpation of the 1st PIP, 1st MCP, 2nd PIP, 2nd MCP, 3rd PIP, 3rd MCP, 4th PIP, 4th MCP, 5th PIP, 5th MCP and ankle.    She has swelling of the knee      Back/Neck Exam   Tenderness Right paramedian tenderness of the Upper L-Spine, Lower L-Spine and SI Joint.Left paramedian tenderness of the Upper L-Spine, Lower L-Spine and SI Joint.         Component      Latest Ref Rng 10/25/2023    Hemoglobin A1C External      4.2 - 5.8 % 5.8 (E)   EAG (MG/DL) 107.14 (E)   T4, Free      0.93 - 1.70 ng/dL 1.69 (E)   TSH      0.27 - 4.20 uIU/mL 0.04 (L) (E)   Vitamin B-12      232 - 1,245 pg/mL 1,737 (H) (E)   ALT      10 - 60 IU/L 21 (E)        Assessment:       1. PSA (psoriatic arthritis)    2. Sjogren's syndrome, with unspecified organ involvement    3. Current chronic use of systemic steroids    4. Chronic pain syndrome              Plan:       PSA (psoriatic arthritis)  -     CBC Auto Differential; Future; Expected date: 10/30/2023  -     Comprehensive Metabolic Panel; Future; Expected date: 10/30/2023  -     C-Reactive Protein; Future; Expected date: 10/30/2023  -     Sedimentation Rate; Future; Expected date: 10/30/2023  -     ketorolac injection 60 mg  -     methylPREDNISolone acetate injection 80 mg  -     cyanocobalamin injection 1,000 mcg    Sjogren's syndrome, with unspecified organ involvement    Current chronic use of systemic steroids    Chronic pain syndrome          Assessment:  58 year old female with  Sjogren's syndrome (+SSA, SIENA 1:640)  --chronic ITP, stable   --Hx of thyroid cancer 2007 s/p total thyroidectomy [facility] followed by Dr. Mansfield in   --elevated CK  --hyperglycemia, HgbA1c 5.9%  --plaquenil (low platelet/bruising)  --CKD    Plan:  1. toradol 60, depo 80, b12 today  2. Cont medrol 4 mg daily, increase 8 mg if needed for flare  3. Cont otezla 30 mg bid. May need to decrease dose based on renal function  4. Cont ozempic 0.5 mg once weekly  5. Cont tramadol PRN.  I have checked [place] prescription monitoring program site and no unusual or abnormal behavior has occurred pt understand the risk and benefits of taking opioid medications and has decided to continue the medication.  6. Check labs soon  7. Consider PT referral for degenerative arthritis    Follow up:  4 mo Dr. Meaghan starr/labs prior

## 2023-11-15 LAB
ALBUMIN SERPL-MCNC: 4.2 G/DL (ref 3.6–5.1)
ALBUMIN/GLOB SERPL: 1.3 (CALC) (ref 1–2.5)
ALP SERPL-CCNC: 71 U/L (ref 37–153)
ALT SERPL-CCNC: 16 U/L (ref 6–29)
AST SERPL-CCNC: 14 U/L (ref 10–35)
BASOPHILS # BLD AUTO: 46 CELLS/UL (ref 0–200)
BASOPHILS NFR BLD AUTO: 0.4 %
BILIRUB SERPL-MCNC: 0.3 MG/DL (ref 0.2–1.2)
BUN SERPL-MCNC: 16 MG/DL (ref 7–25)
BUN/CREAT SERPL: 10 (CALC) (ref 6–22)
CALCIUM SERPL-MCNC: 9.6 MG/DL (ref 8.6–10.4)
CHLORIDE SERPL-SCNC: 104 MMOL/L (ref 98–110)
CO2 SERPL-SCNC: 23 MMOL/L (ref 20–32)
CREAT SERPL-MCNC: 1.62 MG/DL (ref 0.5–1.05)
CRP SERPL-MCNC: 2.4 MG/L
EGFR: 36 ML/MIN/1.73M2
EOSINOPHIL # BLD AUTO: 46 CELLS/UL (ref 15–500)
EOSINOPHIL NFR BLD AUTO: 0.4 %
ERYTHROCYTE [DISTWIDTH] IN BLOOD BY AUTOMATED COUNT: 13.1 % (ref 11–15)
ERYTHROCYTE [SEDIMENTATION RATE] IN BLOOD BY WESTERGREN METHOD: 25 MM/H
GLOBULIN SER CALC-MCNC: 3.2 G/DL (CALC) (ref 1.9–3.7)
GLUCOSE SERPL-MCNC: 76 MG/DL (ref 65–139)
HCT VFR BLD AUTO: 41.7 % (ref 35–45)
HGB BLD-MCNC: 13.4 G/DL (ref 11.7–15.5)
LYMPHOCYTES # BLD AUTO: 2132 CELLS/UL (ref 850–3900)
LYMPHOCYTES NFR BLD AUTO: 18.7 %
MCH RBC QN AUTO: 27.9 PG (ref 27–33)
MCHC RBC AUTO-ENTMCNC: 32.1 G/DL (ref 32–36)
MCV RBC AUTO: 86.7 FL (ref 80–100)
MONOCYTES # BLD AUTO: 1026 CELLS/UL (ref 200–950)
MONOCYTES NFR BLD AUTO: 9 %
NEUTROPHILS # BLD AUTO: 8151 CELLS/UL (ref 1500–7800)
NEUTROPHILS NFR BLD AUTO: 71.5 %
PLATELET # BLD AUTO: 149 THOUSAND/UL (ref 140–400)
PMV BLD REES-ECKER: 12.3 FL (ref 7.5–12.5)
POTASSIUM SERPL-SCNC: 3.7 MMOL/L (ref 3.5–5.3)
PROT SERPL-MCNC: 7.4 G/DL (ref 6.1–8.1)
RBC # BLD AUTO: 4.81 MILLION/UL (ref 3.8–5.1)
SODIUM SERPL-SCNC: 138 MMOL/L (ref 135–146)
WBC # BLD AUTO: 11.4 THOUSAND/UL (ref 3.8–10.8)

## 2023-11-20 DIAGNOSIS — N28.9 DECREASED RENAL FUNCTION: ICD-10-CM

## 2023-11-20 DIAGNOSIS — M35.00 SJOGREN'S SYNDROME, WITH UNSPECIFIED ORGAN INVOLVEMENT: Primary | ICD-10-CM

## 2023-11-21 ENCOUNTER — TELEPHONE (OUTPATIENT)
Dept: RHEUMATOLOGY | Facility: CLINIC | Age: 58
End: 2023-11-21
Payer: COMMERCIAL

## 2023-11-21 NOTE — TELEPHONE ENCOUNTER
Attempted to call patient regarding lab results. Left a message with result notes informed BMP has been sent to [facility] to repeat kidney functions. Advised to call office with any questions.

## 2023-12-12 LAB
BUN SERPL-MCNC: 16 MG/DL (ref 7–25)
BUN/CREAT SERPL: 11 (CALC) (ref 6–22)
CALCIUM SERPL-MCNC: 9.1 MG/DL (ref 8.6–10.4)
CHLORIDE SERPL-SCNC: 106 MMOL/L (ref 98–110)
CO2 SERPL-SCNC: 25 MMOL/L (ref 20–32)
CREAT SERPL-MCNC: 1.42 MG/DL (ref 0.5–1.05)
EGFR: 42 ML/MIN/1.73M2
GLUCOSE SERPL-MCNC: 107 MG/DL (ref 65–139)
POTASSIUM SERPL-SCNC: 3.5 MMOL/L (ref 3.5–5.3)
SODIUM SERPL-SCNC: 141 MMOL/L (ref 135–146)

## 2024-02-28 ENCOUNTER — OFFICE VISIT (OUTPATIENT)
Dept: RHEUMATOLOGY | Facility: CLINIC | Age: 59
End: 2024-02-28
Payer: COMMERCIAL

## 2024-02-28 VITALS
BODY MASS INDEX: 31.9 KG/M2 | SYSTOLIC BLOOD PRESSURE: 134 MMHG | WEIGHT: 203.25 LBS | DIASTOLIC BLOOD PRESSURE: 77 MMHG | HEIGHT: 67 IN | HEART RATE: 91 BPM

## 2024-02-28 DIAGNOSIS — J32.9 SINUSITIS, UNSPECIFIED CHRONICITY, UNSPECIFIED LOCATION: ICD-10-CM

## 2024-02-28 DIAGNOSIS — D69.3 CHRONIC ITP (IDIOPATHIC THROMBOCYTOPENIA): ICD-10-CM

## 2024-02-28 DIAGNOSIS — L40.50 PSA (PSORIATIC ARTHRITIS): Primary | ICD-10-CM

## 2024-02-28 DIAGNOSIS — G89.4 CHRONIC PAIN SYNDROME: ICD-10-CM

## 2024-02-28 DIAGNOSIS — G60.3 IDIOPATHIC PROGRESSIVE NEUROPATHY: ICD-10-CM

## 2024-02-28 DIAGNOSIS — M06.00 SERONEGATIVE RHEUMATOID ARTHRITIS: ICD-10-CM

## 2024-02-28 DIAGNOSIS — E11.69 TYPE 2 DIABETES MELLITUS WITH OTHER SPECIFIED COMPLICATION, WITHOUT LONG-TERM CURRENT USE OF INSULIN: ICD-10-CM

## 2024-02-28 DIAGNOSIS — B37.0 THRUSH: ICD-10-CM

## 2024-02-28 DIAGNOSIS — M35.00 SJOGREN'S SYNDROME, WITH UNSPECIFIED ORGAN INVOLVEMENT: ICD-10-CM

## 2024-02-28 PROCEDURE — 99215 OFFICE O/P EST HI 40 MIN: CPT | Mod: 25,S$GLB,, | Performed by: INTERNAL MEDICINE

## 2024-02-28 PROCEDURE — 3008F BODY MASS INDEX DOCD: CPT | Mod: CPTII,S$GLB,, | Performed by: INTERNAL MEDICINE

## 2024-02-28 PROCEDURE — 99999 PR PBB SHADOW E&M-EST. PATIENT-LVL IV: CPT | Mod: PBBFAC,,, | Performed by: INTERNAL MEDICINE

## 2024-02-28 PROCEDURE — 3078F DIAST BP <80 MM HG: CPT | Mod: CPTII,S$GLB,, | Performed by: INTERNAL MEDICINE

## 2024-02-28 PROCEDURE — 96372 THER/PROPH/DIAG INJ SC/IM: CPT | Mod: S$GLB,,, | Performed by: INTERNAL MEDICINE

## 2024-02-28 PROCEDURE — 1159F MED LIST DOCD IN RCRD: CPT | Mod: CPTII,S$GLB,, | Performed by: INTERNAL MEDICINE

## 2024-02-28 PROCEDURE — 3075F SYST BP GE 130 - 139MM HG: CPT | Mod: CPTII,S$GLB,, | Performed by: INTERNAL MEDICINE

## 2024-02-28 RX ORDER — BENZONATATE 200 MG/1
400 CAPSULE ORAL 2 TIMES DAILY
COMMUNITY
Start: 2024-02-24

## 2024-02-28 RX ORDER — CEVIMELINE HYDROCHLORIDE 30 MG/1
30 CAPSULE ORAL 3 TIMES DAILY
Qty: 270 CAPSULE | Refills: 3 | Status: SHIPPED | OUTPATIENT
Start: 2024-02-28 | End: 2025-02-27

## 2024-02-28 RX ORDER — TIZANIDINE 4 MG/1
4 TABLET ORAL EVERY 8 HOURS
Qty: 270 TABLET | Refills: 1 | Status: SHIPPED | OUTPATIENT
Start: 2024-02-28 | End: 2025-02-27

## 2024-02-28 RX ORDER — CYANOCOBALAMIN 1000 UG/ML
1000 INJECTION, SOLUTION INTRAMUSCULAR; SUBCUTANEOUS
Status: COMPLETED | OUTPATIENT
Start: 2024-02-28 | End: 2024-02-28

## 2024-02-28 RX ORDER — SEMAGLUTIDE 1.34 MG/ML
1 INJECTION, SOLUTION SUBCUTANEOUS
Qty: 3 ML | Refills: 11 | Status: SHIPPED | OUTPATIENT
Start: 2024-02-28 | End: 2025-02-27

## 2024-02-28 RX ORDER — METHYLPREDNISOLONE ACETATE 80 MG/ML
80 INJECTION, SUSPENSION INTRA-ARTICULAR; INTRALESIONAL; INTRAMUSCULAR; SOFT TISSUE
Status: DISCONTINUED | OUTPATIENT
Start: 2024-02-28 | End: 2024-02-28

## 2024-02-28 RX ORDER — SEMAGLUTIDE 0.68 MG/ML
0.5 INJECTION, SOLUTION SUBCUTANEOUS
COMMUNITY

## 2024-02-28 RX ORDER — DEXAMETHASONE 1 MG/1
2 TABLET ORAL DAILY
Qty: 20 TABLET | Refills: 0 | Status: SHIPPED | OUTPATIENT
Start: 2024-02-28 | End: 2024-03-09

## 2024-02-28 RX ORDER — CEFTRIAXONE 1 G/1
1 INJECTION, POWDER, FOR SOLUTION INTRAMUSCULAR; INTRAVENOUS
Status: COMPLETED | OUTPATIENT
Start: 2024-02-28 | End: 2024-02-28

## 2024-02-28 RX ORDER — KETOROLAC TROMETHAMINE 30 MG/ML
30 INJECTION, SOLUTION INTRAMUSCULAR; INTRAVENOUS
Status: COMPLETED | OUTPATIENT
Start: 2024-02-28 | End: 2024-02-28

## 2024-02-28 RX ORDER — AZITHROMYCIN 250 MG/1
TABLET, FILM COATED ORAL
Qty: 6 TABLET | Refills: 0 | Status: SHIPPED | OUTPATIENT
Start: 2024-02-28

## 2024-02-28 RX ORDER — DEXAMETHASONE SODIUM PHOSPHATE 4 MG/ML
8 INJECTION, SOLUTION INTRA-ARTICULAR; INTRALESIONAL; INTRAMUSCULAR; INTRAVENOUS; SOFT TISSUE
Status: COMPLETED | OUTPATIENT
Start: 2024-02-28 | End: 2024-02-28

## 2024-02-28 RX ORDER — FLUCONAZOLE 150 MG/1
150 TABLET ORAL DAILY
Qty: 7 TABLET | Refills: 0 | Status: SHIPPED | OUTPATIENT
Start: 2024-02-28 | End: 2024-03-06

## 2024-02-28 RX ADMIN — CYANOCOBALAMIN 1000 MCG: 1000 INJECTION, SOLUTION INTRAMUSCULAR; SUBCUTANEOUS at 03:02

## 2024-02-28 RX ADMIN — KETOROLAC TROMETHAMINE 30 MG: 30 INJECTION, SOLUTION INTRAMUSCULAR; INTRAVENOUS at 03:02

## 2024-02-28 RX ADMIN — DEXAMETHASONE SODIUM PHOSPHATE 8 MG: 4 INJECTION, SOLUTION INTRA-ARTICULAR; INTRALESIONAL; INTRAMUSCULAR; INTRAVENOUS; SOFT TISSUE at 03:02

## 2024-02-28 RX ADMIN — CEFTRIAXONE 1 G: 1 INJECTION, POWDER, FOR SOLUTION INTRAMUSCULAR; INTRAVENOUS at 03:02

## 2024-02-28 ASSESSMENT — ROUTINE ASSESSMENT OF PATIENT INDEX DATA (RAPID3)
PSYCHOLOGICAL DISTRESS SCORE: 0
TOTAL RAPID3 SCORE: 5.33
FATIGUE SCORE: 1.1
PATIENT GLOBAL ASSESSMENT SCORE: 6.5
MDHAQ FUNCTION SCORE: 0.9
PAIN SCORE: 6.5

## 2024-02-28 NOTE — PROGRESS NOTES
Subjective:     Patient ID:  Mack Melton    Chief Complaint:  Disease Management     History of Present Illness:  Follow up: 59 year old female who presents to clinic for follow up on Sjogren's syndrome.  She is otezla  and doing well. Her blood glucose,  PSA otezla is working well. She is doing fair overall. She complains of joint pain involving her hands and ankles with morning stiffness lasting between 30min and 1 hour. Plaquenil is on hold due to low platelet count and bruising. . She will have time getting up after periods of immobility. Medrol helps significantly with her arthritis--when she d/akosua this medication she has a significantly difficulty time moving. She has weakness in her legs at times when walking. She describes legs as heavy. X-ray of lumbar spine showed severe degenerative disc disease. She is requesting a medication to help with her severe pain. She was ill x 1 week and her  had the flu.     Fatigue is unchanged--she has shift work sleep disorder--working nights. She takes provigil and adderall during the day. Ambien 2.5 mg at her bedtime prn.      Sicca sx are improved with evoxac.     Rheumatologic History:   - Diagnosis/es:  - Positive serologies:  - Infectious screening labs:  - Previous Treatments:  - Current Treatments:     Interval History:   Hospitalization since last office visit: No    Patient Active Problem List    Diagnosis Date Noted    Screening for colon cancer 12/09/2020    History of colon polyps 12/08/2020    Sjogren's syndrome 02/13/2017    Idiopathic progressive neuropathy 02/13/2017     Past Surgical History:   Procedure Laterality Date    COLONOSCOPY N/A 12/9/2020    Procedure: COLONOSCOPY;  Surgeon: Tonny Drew MD;  Location: [facility];  Service: Endoscopy;  Laterality: N/A;    THYROID SURGERY      UTERINE FIBROID SURGERY       Social History     Tobacco Use    Smoking status: Former     Current packs/day: 0.00     Types: Cigarettes     Quit date: 1/2/2001      Years since quittin.1   Substance Use Topics    Alcohol use: No     Alcohol/week: 0.0 standard drinks of alcohol    Drug use: No     Family History   Problem Relation Age of Onset    No Known Problems Mother     No Known Problems Father      Review of patient's allergies indicates:  No Known Allergies    Review of Systems   Review of Systems     Current Medications:  Current Outpatient Medications   Medication Instructions    amLODIPine (NORVASC) 10 mg, Oral, Daily    atomoxetine (STRATTERA) 40 MG capsule No dose, route, or frequency recorded.    azithromycin (Z-DANYA) 250 MG tablet Take 2 tablets by mouth on day 1; Take 1 tablet by mouth on days 2-5    benzonatate (TESSALON) 400 mg, Oral, 2 times daily    buPROPion (WELLBUTRIN XL) 150 mg, Oral, Every morning    cevimeline (EVOXAC) 30 mg, Oral, 3 times daily    cholecalciferol, vitamin D3, 5,000 unit Tab Oral    clobetasol 0.05% (TEMOVATE) 0.05 % Oint Topical (Top), 2 times daily    co-enzyme Q-10 30 mg, Oral    cyanocobalamin 1,000 mcg/mL injection INJECT ONE ML IN THE MUSCLE WEEKLY    dexAMETHasone (DECADRON) 2 mg, Oral, Daily    estrogen, conjugated,-medroxyprogesterone (PREMPRO) 0.625-5 mg per tablet 1 tablet, Oral, Every morning    fluconazole (DIFLUCAN) 150 mg, Oral, Daily    fluticasone propionate (FLONASE) 50 mcg/actuation nasal spray 2 sprays, Nasal    folic acid (FOLVITE) 1 MG tablet TAKE ONE TABLET BY MOUTH DAILY    levothyroxine (TIROSINT) 125 mcg, Oral, Daily    LIDOcaine (LIDODERM) 5 % 1 patch, Transdermal, Daily, Remove & Discard patch within 12 hours or as directed by MD    liothyronine (CYTOMEL) 5 MCG Tab TAKE ONE TABLET BY MOUTH TWICE DAILY    norethindrone-ethinyl estradiol (FEMHRT /) 1-5 mg-mcg Tab No dose, route, or frequency recorded.    OTEZLA 30 mg, Oral, 2 times daily    OZEMPIC 0.5 mg, Subcutaneous, Every 7 days    OZEMPIC 1 mg, Subcutaneous, Every 7 days    pantoprazole (PROTONIX) 40 mg, Oral, Daily    terazosin (HYTRIN) 2 mg, Oral     "tiZANidine (ZANAFLEX) 4 mg, Oral, Every 8 hours    zolpidem (AMBIEN) 5 mg, Oral, Nightly PRN         Objective:     Vitals:    02/28/24 1249   BP: 134/77   Pulse: 91   Weight: 92.2 kg (203 lb 4.2 oz)   Height: 5' 7.01" (1.702 m)   PainSc: 0-No pain      Body mass index is 31.83 kg/m².     Physical Examinations:  Physical Exam     Disease Assessment Scores:  Patient's Global Assessment of arthritis (0-10): 3  Physician's Global Assessment of arthritis (0-10): 5  Number of Tender Joints (0-28): 6  Number of Swollen Joints (0-28): 7    There is currently no information documented on the homunculus. Go to the Rheumatology activity and complete the homunculus joint exam.          No data to display                Monitoring Lab Results:  Lab Results   Component Value Date    WBC 11.4 (H) 11/14/2023    RBC 4.81 11/14/2023    HGB 13.4 11/14/2023    HCT 41.7 11/14/2023    MCV 86.7 11/14/2023    MCH 27.9 11/14/2023    MCHC 32.1 11/14/2023    RDW 13.1 11/14/2023     11/14/2023        Lab Results   Component Value Date     12/11/2023    K 3.5 12/11/2023     12/11/2023    CO2 25 12/11/2023     12/11/2023    BUN 16 12/11/2023    CREATININE 1.42 (H) 12/11/2023    CALCIUM 9.1 12/11/2023    PROT 7.4 11/14/2023    ALBUMIN 4.2 11/14/2023    BILITOT 0.3 11/14/2023    ALKPHOS 40 01/17/2020    AST 14 11/14/2023    ALT 16 11/14/2023    EGFRNORACEVR 42 (L) 12/11/2023       Lab Results   Component Value Date    SEDRATE 25 11/14/2023    CRP 2.4 11/14/2023        Lab Results   Component Value Date    JTHGJZED81 1,737 (H) 10/25/2023        No results found for: "CHOL", "HDL", "LDLCALC", "TRIG"    No results found for: "RF", "CCPANTIBODIE"  No results found for: "ANASCREEN", "ANATITER", "ANAPATTE", "DSDNA", "SMRNPAB", "SSAANTIBODY", "SSBANTIBODY", "SBM73CZ", "JO1AB"  No results found for: "HLABB27"    Infectious Disease Screening:  No results found for: "HEPBSAG", "HEPBCAB", "HEPBSAB", "HEPBSURFABQU", "HEPBIGM"  No " "results found for: "HEPCAB", "HEPCVAB", "HCVQUANTRES"  No results found for: "TBGOLDPLUS", "QUANTTBGDPL"  No results found for: "QUANTIFERON", "SVCMT", "QUANTAGVALUE", "QUANTNILVALU", "QUANTMITOGEN", "QFTTBAG", "QINT"     Imaging: DEXA, Xrays, MRIs, CTs, etc    Old & Outside Medical Records:  Reviewed old and all outside medical records available in Care Everywhere    Current Medication Changes:  Medication List with Changes/Refills   New Medications    AZITHROMYCIN (Z-DANYA) 250 MG TABLET    Take 2 tablets by mouth on day 1; Take 1 tablet by mouth on days 2-5    DEXAMETHASONE (DECADRON) 1 MG TAB    Take 2 tablets (2 mg total) by mouth once daily. for 10 days    FLUCONAZOLE (DIFLUCAN) 150 MG TAB    Take 1 tablet (150 mg total) by mouth once daily. for 7 days    SEMAGLUTIDE (OZEMPIC) 1 MG/DOSE (4 MG/3 ML)    Inject 1 mg into the skin every 7 days.    TIZANIDINE (ZANAFLEX) 4 MG TABLET    Take 1 tablet (4 mg total) by mouth every 8 (eight) hours.   Current Medications    AMLODIPINE (NORVASC) 10 MG TABLET    Take 1 tablet (10 mg total) by mouth once daily.    APREMILAST (OTEZLA) 30 MG TAB    Take 1 tablet (30 mg total) by mouth 2 (two) times daily.    ATOMOXETINE (STRATTERA) 40 MG CAPSULE        BENZONATATE (TESSALON) 200 MG CAPSULE    Take 400 mg by mouth 2 (two) times daily.    BUPROPION (WELLBUTRIN XL) 150 MG TB24 TABLET    Take 150 mg by mouth every morning.    CHOLECALCIFEROL, VITAMIN D3, 5,000 UNIT TAB    Take by mouth.    CLOBETASOL 0.05% (TEMOVATE) 0.05 % OINT    Apply topically 2 (two) times daily.    CO-ENZYME Q-10 30 MG CAPSULE    Take 30 mg by mouth.    CYANOCOBALAMIN 1,000 MCG/ML INJECTION    INJECT ONE ML IN THE MUSCLE WEEKLY    ESTROGEN, CONJUGATED,-MEDROXYPROGESTERONE (PREMPRO) 0.625-5 MG PER TABLET    Take 1 tablet by mouth every morning.    FLUTICASONE PROPIONATE (FLONASE) 50 MCG/ACTUATION NASAL SPRAY    2 sprays by Nasal route.    FOLIC ACID (FOLVITE) 1 MG TABLET    TAKE ONE TABLET BY MOUTH DAILY    " LEVOTHYROXINE (TIROSINT) 125 MCG CAP    Take 125 mcg by mouth once daily.    LIDOCAINE (LIDODERM) 5 %    Place 1 patch onto the skin once daily. Remove & Discard patch within 12 hours or as directed by MD    LIOTHYRONINE (CYTOMEL) 5 MCG TAB    TAKE ONE TABLET BY MOUTH TWICE DAILY    NORETHINDRONE-ETHINYL ESTRADIOL (FEMHRT 1/5) 1-5 MG-MCG TAB        OZEMPIC 0.25 MG OR 0.5 MG (2 MG/3 ML) PEN INJECTOR    Inject 0.5 mg into the skin every 7 days.    PANTOPRAZOLE (PROTONIX) 40 MG TABLET    Take 40 mg by mouth once daily.    TERAZOSIN (HYTRIN) 2 MG CAPSULE    Take 2 mg by mouth.    ZOLPIDEM (AMBIEN) 5 MG TAB    Take 1 tablet (5 mg total) by mouth nightly as needed (insomnia).   Changed and/or Refilled Medications    Modified Medication Previous Medication    CEVIMELINE (EVOXAC) 30 MG CAPSULE cevimeline (EVOXAC) 30 mg capsule       Take 1 capsule (30 mg total) by mouth 3 (three) times daily.    Take 1 capsule (30 mg total) by mouth 3 (three) times daily.        Assessment:     .   Encounter Diagnoses   Name Primary?    PSA (psoriatic arthritis) Yes    Sjogren's syndrome, with unspecified organ involvement     Chronic pain syndrome     Thrush     Idiopathic progressive neuropathy     Sinusitis, unspecified chronicity, unspecified location     Type 2 diabetes mellitus with other specified complication, without long-term current use of insulin     Seronegative rheumatoid arthritis     Chronic ITP (idiopathic thrombocytopenia)           Plan:      Encounter Diagnoses   Name Primary?    PSA (psoriatic arthritis) Yes    Sjogren's syndrome, with unspecified organ involvement     Chronic pain syndrome     Thrush     Idiopathic progressive neuropathy     Sinusitis, unspecified chronicity, unspecified location     Type 2 diabetes mellitus with other specified complication, without long-term current use of insulin     Seronegative rheumatoid arthritis     Chronic ITP (idiopathic thrombocytopenia)      Mack was seen today for  disease management.    Diagnoses and all orders for this visit:    PSA (psoriatic arthritis)  -     azithromycin (Z-DANYA) 250 MG tablet; Take 2 tablets by mouth on day 1; Take 1 tablet by mouth on days 2-5  -     dexAMETHasone (DECADRON) 1 MG Tab; Take 2 tablets (2 mg total) by mouth once daily. for 10 days  -     fluconazole (DIFLUCAN) 150 MG Tab; Take 1 tablet (150 mg total) by mouth once daily. for 7 days  -     ketorolac injection 30 mg  -     Discontinue: methylPREDNISolone acetate injection 80 mg  -     dexAMETHasone injection 8 mg  -     cyanocobalamin injection 1,000 mcg  -     cefTRIAXone injection 1 g  -     T4, Free; Future  -     TSH; Future  -     Vitamin D; Future  -     C-Reactive Protein; Future  -     Comprehensive Metabolic Panel; Future  -     CBC Auto Differential; Future  -     T3, Free; Future  -     Sedimentation rate, automated; Future  -     T4, Free  -     TSH  -     Vitamin D  -     C-Reactive Protein  -     Comprehensive Metabolic Panel  -     CBC Auto Differential  -     T3, Free  -     Sedimentation rate, automated  -     cevimeline (EVOXAC) 30 mg capsule; Take 1 capsule (30 mg total) by mouth 3 (three) times daily.  -     tiZANidine (ZANAFLEX) 4 MG tablet; Take 1 tablet (4 mg total) by mouth every 8 (eight) hours.    Sjogren's syndrome, with unspecified organ involvement  -     azithromycin (Z-DANYA) 250 MG tablet; Take 2 tablets by mouth on day 1; Take 1 tablet by mouth on days 2-5  -     dexAMETHasone (DECADRON) 1 MG Tab; Take 2 tablets (2 mg total) by mouth once daily. for 10 days  -     fluconazole (DIFLUCAN) 150 MG Tab; Take 1 tablet (150 mg total) by mouth once daily. for 7 days  -     ketorolac injection 30 mg  -     Discontinue: methylPREDNISolone acetate injection 80 mg  -     dexAMETHasone injection 8 mg  -     cyanocobalamin injection 1,000 mcg  -     cefTRIAXone injection 1 g  -     T4, Free; Future  -     TSH; Future  -     Vitamin D; Future  -     C-Reactive Protein;  Future  -     Comprehensive Metabolic Panel; Future  -     CBC Auto Differential; Future  -     T3, Free; Future  -     Sedimentation rate, automated; Future  -     T4, Free  -     TSH  -     Vitamin D  -     C-Reactive Protein  -     Comprehensive Metabolic Panel  -     CBC Auto Differential  -     T3, Free  -     Sedimentation rate, automated  -     cevimeline (EVOXAC) 30 mg capsule; Take 1 capsule (30 mg total) by mouth 3 (three) times daily.  -     tiZANidine (ZANAFLEX) 4 MG tablet; Take 1 tablet (4 mg total) by mouth every 8 (eight) hours.    Chronic pain syndrome  -     azithromycin (Z-DANYA) 250 MG tablet; Take 2 tablets by mouth on day 1; Take 1 tablet by mouth on days 2-5  -     dexAMETHasone (DECADRON) 1 MG Tab; Take 2 tablets (2 mg total) by mouth once daily. for 10 days  -     ketorolac injection 30 mg  -     Discontinue: methylPREDNISolone acetate injection 80 mg  -     dexAMETHasone injection 8 mg  -     cyanocobalamin injection 1,000 mcg  -     cefTRIAXone injection 1 g  -     T4, Free; Future  -     TSH; Future  -     Vitamin D; Future  -     C-Reactive Protein; Future  -     Comprehensive Metabolic Panel; Future  -     CBC Auto Differential; Future  -     T3, Free; Future  -     Sedimentation rate, automated; Future  -     T4, Free  -     TSH  -     Vitamin D  -     C-Reactive Protein  -     Comprehensive Metabolic Panel  -     CBC Auto Differential  -     T3, Free  -     Sedimentation rate, automated  -     cevimeline (EVOXAC) 30 mg capsule; Take 1 capsule (30 mg total) by mouth 3 (three) times daily.  -     tiZANidine (ZANAFLEX) 4 MG tablet; Take 1 tablet (4 mg total) by mouth every 8 (eight) hours.    Thrush  -     fluconazole (DIFLUCAN) 150 MG Tab; Take 1 tablet (150 mg total) by mouth once daily. for 7 days  -     ketorolac injection 30 mg  -     Discontinue: methylPREDNISolone acetate injection 80 mg  -     dexAMETHasone injection 8 mg  -     cyanocobalamin injection 1,000 mcg  -     cefTRIAXone  injection 1 g  -     T4, Free; Future  -     TSH; Future  -     Vitamin D; Future  -     C-Reactive Protein; Future  -     Comprehensive Metabolic Panel; Future  -     CBC Auto Differential; Future  -     T3, Free; Future  -     Sedimentation rate, automated; Future  -     T4, Free  -     TSH  -     Vitamin D  -     C-Reactive Protein  -     Comprehensive Metabolic Panel  -     CBC Auto Differential  -     T3, Free  -     Sedimentation rate, automated    Idiopathic progressive neuropathy  -     ketorolac injection 30 mg  -     Discontinue: methylPREDNISolone acetate injection 80 mg  -     dexAMETHasone injection 8 mg  -     cyanocobalamin injection 1,000 mcg  -     cefTRIAXone injection 1 g  -     T4, Free; Future  -     TSH; Future  -     Vitamin D; Future  -     C-Reactive Protein; Future  -     Comprehensive Metabolic Panel; Future  -     CBC Auto Differential; Future  -     T3, Free; Future  -     Sedimentation rate, automated; Future  -     T4, Free  -     TSH  -     Vitamin D  -     C-Reactive Protein  -     Comprehensive Metabolic Panel  -     CBC Auto Differential  -     T3, Free  -     Sedimentation rate, automated    Sinusitis, unspecified chronicity, unspecified location  -     ketorolac injection 30 mg  -     Discontinue: methylPREDNISolone acetate injection 80 mg  -     dexAMETHasone injection 8 mg  -     cyanocobalamin injection 1,000 mcg  -     cefTRIAXone injection 1 g  -     T4, Free; Future  -     TSH; Future  -     Vitamin D; Future  -     C-Reactive Protein; Future  -     Comprehensive Metabolic Panel; Future  -     CBC Auto Differential; Future  -     T3, Free; Future  -     Sedimentation rate, automated; Future  -     T4, Free  -     TSH  -     Vitamin D  -     C-Reactive Protein  -     Comprehensive Metabolic Panel  -     CBC Auto Differential  -     T3, Free  -     Sedimentation rate, automated    Type 2 diabetes mellitus with other specified complication, without long-term current use of  insulin  -     semaglutide (OZEMPIC) 1 mg/dose (4 mg/3 mL); Inject 1 mg into the skin every 7 days.  -     T4, Free; Future  -     TSH; Future  -     Vitamin D; Future  -     C-Reactive Protein; Future  -     Comprehensive Metabolic Panel; Future  -     CBC Auto Differential; Future  -     T3, Free; Future  -     Sedimentation rate, automated; Future  -     T4, Free  -     TSH  -     Vitamin D  -     C-Reactive Protein  -     Comprehensive Metabolic Panel  -     CBC Auto Differential  -     T3, Free  -     Sedimentation rate, automated    Seronegative rheumatoid arthritis  -     cevimeline (EVOXAC) 30 mg capsule; Take 1 capsule (30 mg total) by mouth 3 (three) times daily.    Chronic ITP (idiopathic thrombocytopenia)  -     cevimeline (EVOXAC) 30 mg capsule; Take 1 capsule (30 mg total) by mouth 3 (three) times daily.      More than 50% of the  40 minute encounter was spent face to face counseling the patient regarding current status and future plan of care as well as side effects  of the medications. All questions were answered to patient's satisfaction also includes  non-face to face time preparing to see the patient (eg, review of tests), Obtaining and/or reviewing separately obtained history, Documenting clinical information in the electronic or other health record, Independently interpreting results

## 2024-03-11 RX ORDER — METHYLPREDNISOLONE 4 MG/1
8 TABLET ORAL
Qty: 180 TABLET | Refills: 0 | Status: SHIPPED | OUTPATIENT
Start: 2024-03-11

## 2024-04-24 RX ORDER — APREMILAST 30 MG/1
30 TABLET, FILM COATED ORAL 2 TIMES DAILY
Qty: 60 TABLET | Refills: 11 | Status: CANCELLED | OUTPATIENT
Start: 2024-04-24

## 2024-06-20 RX ORDER — METHYLPREDNISOLONE 4 MG/1
TABLET ORAL
Qty: 180 TABLET | Refills: 0 | Status: SHIPPED | OUTPATIENT
Start: 2024-06-20

## 2024-06-20 NOTE — TELEPHONE ENCOUNTER
Pharmacy requesting refill on Methylprednisolone 4mg   Pt's LOV 02/28/2024  Pt's NOV 07/08/2024  Medication pending

## 2024-06-26 DIAGNOSIS — I10 HYPERTENSION, UNSPECIFIED TYPE: ICD-10-CM

## 2024-06-26 RX ORDER — AMLODIPINE BESYLATE 10 MG/1
10 TABLET ORAL DAILY
Qty: 90 TABLET | Refills: 3 | Status: SHIPPED | OUTPATIENT
Start: 2024-06-26 | End: 2025-06-26

## 2024-07-08 ENCOUNTER — OFFICE VISIT (OUTPATIENT)
Dept: RHEUMATOLOGY | Facility: CLINIC | Age: 59
End: 2024-07-08
Payer: COMMERCIAL

## 2024-07-08 VITALS
SYSTOLIC BLOOD PRESSURE: 123 MMHG | HEART RATE: 96 BPM | BODY MASS INDEX: 32.15 KG/M2 | DIASTOLIC BLOOD PRESSURE: 83 MMHG | HEIGHT: 67 IN | WEIGHT: 204.81 LBS

## 2024-07-08 DIAGNOSIS — M35.00 SJOGREN'S SYNDROME, WITH UNSPECIFIED ORGAN INVOLVEMENT: ICD-10-CM

## 2024-07-08 DIAGNOSIS — G89.4 CHRONIC PAIN SYNDROME: ICD-10-CM

## 2024-07-08 DIAGNOSIS — L40.50 PSA (PSORIATIC ARTHRITIS): Primary | ICD-10-CM

## 2024-07-08 DIAGNOSIS — E55.9 VITAMIN D DEFICIENCY: ICD-10-CM

## 2024-07-08 PROCEDURE — 3079F DIAST BP 80-89 MM HG: CPT | Mod: CPTII,S$GLB,, | Performed by: PHYSICIAN ASSISTANT

## 2024-07-08 PROCEDURE — 3008F BODY MASS INDEX DOCD: CPT | Mod: CPTII,S$GLB,, | Performed by: PHYSICIAN ASSISTANT

## 2024-07-08 PROCEDURE — 1160F RVW MEDS BY RX/DR IN RCRD: CPT | Mod: CPTII,S$GLB,, | Performed by: PHYSICIAN ASSISTANT

## 2024-07-08 PROCEDURE — 1159F MED LIST DOCD IN RCRD: CPT | Mod: CPTII,S$GLB,, | Performed by: PHYSICIAN ASSISTANT

## 2024-07-08 PROCEDURE — 3074F SYST BP LT 130 MM HG: CPT | Mod: CPTII,S$GLB,, | Performed by: PHYSICIAN ASSISTANT

## 2024-07-08 PROCEDURE — 99999 PR PBB SHADOW E&M-EST. PATIENT-LVL IV: CPT | Mod: PBBFAC,,, | Performed by: PHYSICIAN ASSISTANT

## 2024-07-08 PROCEDURE — 99214 OFFICE O/P EST MOD 30 MIN: CPT | Mod: 25,S$GLB,, | Performed by: PHYSICIAN ASSISTANT

## 2024-07-08 PROCEDURE — 96372 THER/PROPH/DIAG INJ SC/IM: CPT | Mod: S$GLB,,, | Performed by: PHYSICIAN ASSISTANT

## 2024-07-08 RX ORDER — CYANOCOBALAMIN 1000 UG/ML
1000 INJECTION, SOLUTION INTRAMUSCULAR; SUBCUTANEOUS
Qty: 30 ML | Refills: 4 | Status: SHIPPED | OUTPATIENT
Start: 2024-07-08

## 2024-07-08 RX ORDER — CYANOCOBALAMIN 1000 UG/ML
1000 INJECTION, SOLUTION INTRAMUSCULAR; SUBCUTANEOUS
Status: COMPLETED | OUTPATIENT
Start: 2024-07-08 | End: 2024-07-08

## 2024-07-08 RX ORDER — DEXAMETHASONE SODIUM PHOSPHATE 4 MG/ML
8 INJECTION, SOLUTION INTRA-ARTICULAR; INTRALESIONAL; INTRAMUSCULAR; INTRAVENOUS; SOFT TISSUE
Status: COMPLETED | OUTPATIENT
Start: 2024-07-08 | End: 2024-07-08

## 2024-07-08 RX ORDER — LIDOCAINE 50 MG/G
1 PATCH TOPICAL DAILY
Qty: 30 PATCH | Refills: 3 | Status: SHIPPED | OUTPATIENT
Start: 2024-07-08

## 2024-07-08 RX ORDER — METHYLPREDNISOLONE 4 MG/1
TABLET ORAL
Qty: 180 TABLET | Refills: 1 | Status: SHIPPED | OUTPATIENT
Start: 2024-07-08

## 2024-07-08 RX ORDER — CLOBETASOL PROPIONATE 0.5 MG/G
OINTMENT TOPICAL 2 TIMES DAILY
Qty: 60 G | Refills: 5 | Status: SHIPPED | OUTPATIENT
Start: 2024-07-08 | End: 2025-01-04

## 2024-07-08 RX ORDER — TIZANIDINE 4 MG/1
4 TABLET ORAL EVERY 8 HOURS PRN
Qty: 270 TABLET | Refills: 1 | Status: SHIPPED | OUTPATIENT
Start: 2024-07-08 | End: 2025-07-08

## 2024-07-08 RX ADMIN — DEXAMETHASONE SODIUM PHOSPHATE 8 MG: 4 INJECTION, SOLUTION INTRA-ARTICULAR; INTRALESIONAL; INTRAMUSCULAR; INTRAVENOUS; SOFT TISSUE at 02:07

## 2024-07-08 RX ADMIN — CYANOCOBALAMIN 1000 MCG: 1000 INJECTION, SOLUTION INTRAMUSCULAR; SUBCUTANEOUS at 02:07

## 2024-07-08 NOTE — PROGRESS NOTES
Subjective:       Patient ID: Mack Melton is a 59 y.o. female.    Chief Complaint: Follow-up (4 month follow up visit)    Mrs. Melton is a 59 year old female who presents to clinic for follow up on Sjogren's syndrome. She is doing well on otezla with less joint pain and stiffness. She was found to have decreased renal function and protonix/ozempic were held. She has f/u with Nephrology next month. Renal US was normal.       Current tx:  1. Medrol 4 mg daily   2. otezla      Review of Systems   Constitutional:  Positive for activity change and fatigue. Negative for appetite change, chills and fever.   HENT:          Dry mouth   Eyes:  Negative for visual disturbance.        Dry eyes   Respiratory:  Negative for cough and shortness of breath.    Cardiovascular:  Negative for chest pain, palpitations and leg swelling.   Gastrointestinal:  Negative for abdominal pain, constipation, diarrhea, nausea and vomiting.   Musculoskeletal:  Positive for arthralgias, back pain, joint swelling, neck pain and neck stiffness.   Neurological:  Negative for dizziness, weakness, light-headedness and headaches.         Objective:     Vitals:    07/08/24 1335   BP: 123/83   Pulse: 96         Past Medical History:   Diagnosis Date    Acid reflux     Arthritis     History of colon polyps     Hypertension     Sjogren's syndrome     Sleep apnea     not using CPAP    Thyroid cancer      Past Surgical History:   Procedure Laterality Date    COLONOSCOPY N/A 12/9/2020    Procedure: COLONOSCOPY;  Surgeon: Tonny Drew MD;  Location: [facility];  Service: Endoscopy;  Laterality: N/A;    THYROID SURGERY      UTERINE FIBROID SURGERY            Physical Exam   Eyes: Right conjunctiva is not injected. Left conjunctiva is not injected.   Neck: No JVD present. No thyromegaly present.   Cardiovascular: Normal rate and regular rhythm. Exam reveals no decreased pulses.   Pulmonary/Chest: Effort normal.   Lymphadenopathy:     She has no cervical  adenopathy.   Neurological: Gait normal.   Skin: Rash (hyperpigmentation on the elbow) noted.   Psychiatric: Mood and affect normal. Her mood appears not anxious.   Back/Neck Exam   Tenderness Right paramedian tenderness of the Upper L-Spine, Lower L-Spine and SI Joint.Left paramedian tenderness of the Upper L-Spine, Lower L-Spine and SI Joint.       Component      Latest Ref Rng 11/14/2023 12/11/2023   WBC      3.8 - 10.8 Thousand/uL 11.4 (H)     RBC      3.80 - 5.10 Million/uL 4.81     Hemoglobin      11.7 - 15.5 g/dL 13.4     Hematocrit      35.0 - 45.0 % 41.7     MCV      80.0 - 100.0 fL 86.7     MCH      27.0 - 33.0 pg 27.9     MCHC      32.0 - 36.0 g/dL 32.1     RDW      11.0 - 15.0 % 13.1     Platelet Count      140 - 400 Thousand/uL 149     MPV      7.5 - 12.5 fL 12.3     Neutrophils, Abs      1,500 - 7,800 cells/uL 8,151 (H)     Lymph #      850 - 3,900 cells/uL 2,132     Mono #      200 - 950 cells/uL 1,026 (H)     Eos #      15 - 500 cells/uL 46     Baso #      0 - 200 cells/uL 46     Neutrophils Relative      % 71.5     Lymph %      % 18.7     Mono %      % 9.0     Eos %      % 0.4     Basophil %      % 0.4     Glucose      65 - 139 mg/dL 76  107    BUN      7 - 25 mg/dL 16  16    Creatinine      0.50 - 1.05 mg/dL 1.62 (H)  1.42 (H)    eGFR      > OR = 60 mL/min/1.73m2 36 (L)  42 (L)    BUN/CREAT RATIO      6 - 22 (calc) 10  11    Sodium      135 - 146 mmol/L 138  141    Potassium      3.5 - 5.3 mmol/L 3.7  3.5    Chloride      98 - 110 mmol/L 104  106    CO2      20 - 32 mmol/L 23  25    Calcium      8.6 - 10.4 mg/dL 9.6  9.1    PROTEIN TOTAL      6.1 - 8.1 g/dL 7.4     Albumin      3.6 - 5.1 g/dL 4.2     Globulin, Total      1.9 - 3.7 g/dL (calc) 3.2     Albumin/Globulin Ratio      1.0 - 2.5 (calc) 1.3     BILIRUBIN TOTAL      0.2 - 1.2 mg/dL 0.3     ALP      37 - 153 U/L 71     AST      10 - 35 U/L 14     ALT      6 - 29 U/L 16     CRP      <8.0 mg/L 2.4     Sed Rate      < OR = 30 mm/h 25             Assessment:       1. PSA (psoriatic arthritis)    2. Sjogren's syndrome, with unspecified organ involvement    3. Vitamin D deficiency    4. Chronic pain syndrome                Plan:       PSA (psoriatic arthritis)  -     clobetasol 0.05% (TEMOVATE) 0.05 % Oint; Apply topically 2 (two) times daily.  Dispense: 60 g; Refill: 5  -     LIDOcaine (LIDODERM) 5 %; Place 1 patch onto the skin once daily. Remove & Discard patch within 12 hours or as directed by MD  Dispense: 30 patch; Refill: 3  -     methylPREDNISolone (MEDROL) 4 MG Tab; TAKE 2 TABLETS BY MOUTH DAILY  Dispense: 180 tablet; Refill: 1  -     dexAMETHasone injection 8 mg  -     cyanocobalamin injection 1,000 mcg  -     CBC Auto Differential; Future; Expected date: 07/08/2024  -     Comprehensive Metabolic Panel; Future; Expected date: 07/08/2024  -     C-Reactive Protein; Future; Expected date: 07/08/2024  -     Sedimentation rate; Future; Expected date: 07/08/2024    Sjogren's syndrome, with unspecified organ involvement  -     cyanocobalamin 1,000 mcg/mL injection; Inject 1 mL (1,000 mcg total) into the skin every 7 days. INJECT ONE ML IN THE MUSCLE WEEKLY  Dispense: 30 mL; Refill: 4  -     tiZANidine (ZANAFLEX) 4 MG tablet; Take 1 tablet (4 mg total) by mouth every 8 (eight) hours as needed (back pain).  Dispense: 270 tablet; Refill: 1  -     methylPREDNISolone (MEDROL) 4 MG Tab; TAKE 2 TABLETS BY MOUTH DAILY  Dispense: 180 tablet; Refill: 1  -     dexAMETHasone injection 8 mg  -     cyanocobalamin injection 1,000 mcg  -     CBC Auto Differential; Future; Expected date: 07/08/2024  -     Comprehensive Metabolic Panel; Future; Expected date: 07/08/2024  -     C-Reactive Protein; Future; Expected date: 07/08/2024  -     Sedimentation rate; Future; Expected date: 07/08/2024    Vitamin D deficiency  -     Vitamin D; Future; Expected date: 07/08/2024    Chronic pain syndrome              59 year old female with  Sjogren's syndrome (+SSA, SIENA  1:640)  --chronic ITP, stable   --Hx of thyroid cancer 2007 s/p total thyroidectomy [facility] followed by Dr. Mansfield in [place]  --elevated CK  --hyperglycemia, HgbA1c 5.9%  --plaquenil (low platelet/bruising)  --CKD    Plan:  1. Dexa 8, b12 given today  2. Cont medrol 4 mg daily, try to taper to 2 mg daily if possible  3. Cont otezla 30 mg bid. May need to decrease dose based on renal function  4. Hold ozempic until cleared by Nephrology      Follow up:  4 mo Dr. Harding w/labs prior

## 2024-07-08 NOTE — PROGRESS NOTES
2 pt ID used, allergies reviewed, See MAR for meds,doses, and injection sites. Pt tolerated well. TC LPN

## 2024-07-11 PROBLEM — L40.50 PSA (PSORIATIC ARTHRITIS): Status: ACTIVE | Noted: 2024-07-11

## 2024-09-19 DIAGNOSIS — L40.50 PSA (PSORIATIC ARTHRITIS): ICD-10-CM

## 2024-09-19 DIAGNOSIS — M06.00 SERONEGATIVE RHEUMATOID ARTHRITIS: ICD-10-CM

## 2024-09-19 DIAGNOSIS — M35.00 SJOGREN'S SYNDROME, WITH UNSPECIFIED ORGAN INVOLVEMENT: ICD-10-CM

## 2024-09-19 DIAGNOSIS — D69.3 CHRONIC ITP (IDIOPATHIC THROMBOCYTOPENIA): ICD-10-CM

## 2024-09-19 DIAGNOSIS — G89.4 CHRONIC PAIN SYNDROME: ICD-10-CM

## 2024-09-19 RX ORDER — CEVIMELINE HYDROCHLORIDE 30 MG/1
30 CAPSULE ORAL 3 TIMES DAILY
Qty: 270 CAPSULE | Refills: 3 | Status: SHIPPED | OUTPATIENT
Start: 2024-09-19 | End: 2025-09-19

## 2024-09-26 RX ORDER — SEMAGLUTIDE 0.68 MG/ML
INJECTION, SOLUTION SUBCUTANEOUS
Qty: 3 ML | OUTPATIENT
Start: 2024-09-26

## 2024-11-07 ENCOUNTER — CLINICAL SUPPORT (OUTPATIENT)
Dept: RHEUMATOLOGY | Facility: CLINIC | Age: 59
End: 2024-11-07
Payer: COMMERCIAL

## 2024-11-07 VITALS — SYSTOLIC BLOOD PRESSURE: 123 MMHG | DIASTOLIC BLOOD PRESSURE: 83 MMHG

## 2024-11-07 DIAGNOSIS — M35.00 SJOGREN'S SYNDROME, WITH UNSPECIFIED ORGAN INVOLVEMENT: ICD-10-CM

## 2024-11-07 DIAGNOSIS — L40.50 PSA (PSORIATIC ARTHRITIS): Primary | ICD-10-CM

## 2024-11-07 DIAGNOSIS — G89.4 CHRONIC PAIN SYNDROME: ICD-10-CM

## 2024-11-07 PROCEDURE — 99999 PR PBB SHADOW E&M-EST. PATIENT-LVL III: CPT | Mod: PBBFAC,,,

## 2024-11-07 RX ORDER — CYANOCOBALAMIN 1000 UG/ML
1000 INJECTION, SOLUTION INTRAMUSCULAR; SUBCUTANEOUS
Status: COMPLETED | OUTPATIENT
Start: 2024-11-07 | End: 2024-11-07

## 2024-11-07 RX ORDER — DEXAMETHASONE SODIUM PHOSPHATE 4 MG/ML
4 INJECTION, SOLUTION INTRA-ARTICULAR; INTRALESIONAL; INTRAMUSCULAR; INTRAVENOUS; SOFT TISSUE
Status: COMPLETED | OUTPATIENT
Start: 2024-11-07 | End: 2024-11-07

## 2024-11-07 RX ADMIN — DEXAMETHASONE SODIUM PHOSPHATE 4 MG: 4 INJECTION, SOLUTION INTRA-ARTICULAR; INTRALESIONAL; INTRAMUSCULAR; INTRAVENOUS; SOFT TISSUE at 03:11

## 2024-11-07 RX ADMIN — CYANOCOBALAMIN 1000 MCG: 1000 INJECTION, SOLUTION INTRAMUSCULAR; SUBCUTANEOUS at 03:11

## 2025-01-26 DIAGNOSIS — M35.00 SJOGREN'S SYNDROME, WITH UNSPECIFIED ORGAN INVOLVEMENT: ICD-10-CM

## 2025-01-28 RX ORDER — CYANOCOBALAMIN 1000 UG/ML
INJECTION, SOLUTION INTRAMUSCULAR; SUBCUTANEOUS
Qty: 30 ML | Refills: 4 | Status: SHIPPED | OUTPATIENT
Start: 2025-01-28

## 2025-01-29 ENCOUNTER — PATIENT MESSAGE (OUTPATIENT)
Dept: RHEUMATOLOGY | Facility: CLINIC | Age: 60
End: 2025-01-29
Payer: COMMERCIAL

## 2025-03-04 DIAGNOSIS — E11.69 TYPE 2 DIABETES MELLITUS WITH OTHER SPECIFIED COMPLICATION, WITHOUT LONG-TERM CURRENT USE OF INSULIN: ICD-10-CM

## 2025-03-08 RX ORDER — SEMAGLUTIDE 1.34 MG/ML
1 INJECTION, SOLUTION SUBCUTANEOUS
Qty: 3 ML | Refills: 11 | Status: SHIPPED | OUTPATIENT
Start: 2025-03-08 | End: 2026-03-08

## 2025-03-31 ENCOUNTER — OFFICE VISIT (OUTPATIENT)
Dept: RHEUMATOLOGY | Facility: CLINIC | Age: 60
End: 2025-03-31
Payer: COMMERCIAL

## 2025-03-31 VITALS
HEART RATE: 101 BPM | WEIGHT: 224.88 LBS | DIASTOLIC BLOOD PRESSURE: 75 MMHG | BODY MASS INDEX: 35.29 KG/M2 | SYSTOLIC BLOOD PRESSURE: 116 MMHG | HEIGHT: 67 IN

## 2025-03-31 DIAGNOSIS — N28.9 RENAL DISEASE: ICD-10-CM

## 2025-03-31 DIAGNOSIS — E11.69 TYPE 2 DIABETES MELLITUS WITH OTHER SPECIFIED COMPLICATION, WITHOUT LONG-TERM CURRENT USE OF INSULIN: Primary | ICD-10-CM

## 2025-03-31 DIAGNOSIS — M35.00 SJOGREN'S SYNDROME, WITH UNSPECIFIED ORGAN INVOLVEMENT: ICD-10-CM

## 2025-03-31 DIAGNOSIS — G47.30 HYPERSOMNIA WITH SLEEP APNEA: ICD-10-CM

## 2025-03-31 DIAGNOSIS — G47.30 SLEEP APNEA, UNSPECIFIED TYPE: ICD-10-CM

## 2025-03-31 DIAGNOSIS — N18.31 STAGE 3A CHRONIC KIDNEY DISEASE: ICD-10-CM

## 2025-03-31 DIAGNOSIS — L40.50 PSA (PSORIATIC ARTHRITIS): ICD-10-CM

## 2025-03-31 DIAGNOSIS — R53.82 CHRONIC FATIGUE: ICD-10-CM

## 2025-03-31 DIAGNOSIS — G47.10 HYPERSOMNIA WITH SLEEP APNEA: ICD-10-CM

## 2025-03-31 PROCEDURE — 1159F MED LIST DOCD IN RCRD: CPT | Mod: CPTII,S$GLB,, | Performed by: INTERNAL MEDICINE

## 2025-03-31 PROCEDURE — 3078F DIAST BP <80 MM HG: CPT | Mod: CPTII,S$GLB,, | Performed by: INTERNAL MEDICINE

## 2025-03-31 PROCEDURE — 99999 PR PBB SHADOW E&M-EST. PATIENT-LVL IV: CPT | Mod: PBBFAC,,, | Performed by: INTERNAL MEDICINE

## 2025-03-31 PROCEDURE — 3074F SYST BP LT 130 MM HG: CPT | Mod: CPTII,S$GLB,, | Performed by: INTERNAL MEDICINE

## 2025-03-31 PROCEDURE — 99214 OFFICE O/P EST MOD 30 MIN: CPT | Mod: 25,S$GLB,, | Performed by: INTERNAL MEDICINE

## 2025-03-31 PROCEDURE — 3008F BODY MASS INDEX DOCD: CPT | Mod: CPTII,S$GLB,, | Performed by: INTERNAL MEDICINE

## 2025-03-31 PROCEDURE — 1160F RVW MEDS BY RX/DR IN RCRD: CPT | Mod: CPTII,S$GLB,, | Performed by: INTERNAL MEDICINE

## 2025-03-31 PROCEDURE — 4010F ACE/ARB THERAPY RXD/TAKEN: CPT | Mod: CPTII,S$GLB,, | Performed by: INTERNAL MEDICINE

## 2025-03-31 PROCEDURE — 96372 THER/PROPH/DIAG INJ SC/IM: CPT | Mod: S$GLB,,, | Performed by: INTERNAL MEDICINE

## 2025-03-31 RX ORDER — DEXAMETHASONE SODIUM PHOSPHATE 4 MG/ML
8 INJECTION, SOLUTION INTRA-ARTICULAR; INTRALESIONAL; INTRAMUSCULAR; INTRAVENOUS; SOFT TISSUE
Status: COMPLETED | OUTPATIENT
Start: 2025-03-31 | End: 2025-03-31

## 2025-03-31 RX ORDER — TIZANIDINE 4 MG/1
4 TABLET ORAL EVERY 8 HOURS PRN
Qty: 270 TABLET | Refills: 1 | Status: SHIPPED | OUTPATIENT
Start: 2025-03-31 | End: 2026-03-31

## 2025-03-31 RX ORDER — KETOROLAC TROMETHAMINE 30 MG/ML
30 INJECTION, SOLUTION INTRAMUSCULAR; INTRAVENOUS
Status: COMPLETED | OUTPATIENT
Start: 2025-03-31 | End: 2025-03-31

## 2025-03-31 RX ORDER — ARMODAFINIL 250 MG/1
250 TABLET ORAL DAILY
Qty: 30 TABLET | Refills: 3 | Status: SHIPPED | OUTPATIENT
Start: 2025-03-31 | End: 2025-07-29

## 2025-03-31 RX ORDER — CYANOCOBALAMIN 1000 UG/ML
1000 INJECTION, SOLUTION INTRAMUSCULAR; SUBCUTANEOUS
Status: COMPLETED | OUTPATIENT
Start: 2025-03-31 | End: 2025-03-31

## 2025-03-31 RX ORDER — METHYLPREDNISOLONE 4 MG/1
TABLET ORAL
Qty: 180 TABLET | Refills: 1 | Status: SHIPPED | OUTPATIENT
Start: 2025-03-31

## 2025-03-31 RX ORDER — BUPROPION HYDROCHLORIDE 150 MG/1
300 TABLET ORAL EVERY MORNING
Qty: 60 TABLET | Refills: 8 | Status: SHIPPED | OUTPATIENT
Start: 2025-03-31

## 2025-03-31 RX ADMIN — KETOROLAC TROMETHAMINE 30 MG: 30 INJECTION, SOLUTION INTRAMUSCULAR; INTRAVENOUS at 12:03

## 2025-03-31 RX ADMIN — DEXAMETHASONE SODIUM PHOSPHATE 8 MG: 4 INJECTION, SOLUTION INTRA-ARTICULAR; INTRALESIONAL; INTRAMUSCULAR; INTRAVENOUS; SOFT TISSUE at 12:03

## 2025-03-31 RX ADMIN — CYANOCOBALAMIN 1000 MCG: 1000 INJECTION, SOLUTION INTRAMUSCULAR; SUBCUTANEOUS at 12:03

## 2025-03-31 ASSESSMENT — ROUTINE ASSESSMENT OF PATIENT INDEX DATA (RAPID3)
TOTAL RAPID3 SCORE: 5.28
PAIN SCORE: 6
MDHAQ FUNCTION SCORE: 1
PSYCHOLOGICAL DISTRESS SCORE: 3.3
PATIENT GLOBAL ASSESSMENT SCORE: 6.5

## 2025-03-31 NOTE — PROGRESS NOTES
Subjective:     Patient ID:  Mack Melton    Chief Complaint:  Disease Management     History of Present Illness:  Pt is a 60 y.o. female    Mrs. Melton is a 59 year old female who presents to clinic for follow up on Sjogren's syndrome. She is doing well on otezla with less joint pain and stiffness. She was found to have decreased renal function and protonix/ozempic were held. She has f/u with Nephrology next month. Renal US was normal.         Current tx:  1. Medrol 4 mg daily   2. otezla     Rheumatologic History:   - Diagnosis/es:  - Positive serologies:  - Infectious screening labs:  - Previous Treatments:  - Current Treatments:     Interval History:   Hospitalization since last office visit: No    Patient Active Problem List    Diagnosis Date Noted    PSA (psoriatic arthritis) 07/11/2024    Screening for colon cancer 12/09/2020    History of colon polyps 12/08/2020    Sjogren's syndrome 02/13/2017    Idiopathic progressive neuropathy 02/13/2017     Past Surgical History:   Procedure Laterality Date    COLONOSCOPY N/A 12/9/2020    Procedure: COLONOSCOPY;  Surgeon: Tonny Drew MD;  Location: [facility];  Service: Endoscopy;  Laterality: N/A;    THYROID SURGERY      UTERINE FIBROID SURGERY       Social History[1]  Family History   Problem Relation Name Age of Onset    No Known Problems Mother      No Known Problems Father       Review of patient's allergies indicates:   Allergen Reactions    Norvasc [amlodipine] Edema     Worsening kidney function    Plaquenil [hydroxychloroquine] Other (See Comments)     Thrombocytopenia         Review of Systems   Review of Systems   Constitutional:  Positive for chills and fatigue. Negative for activity change, appetite change, diaphoresis, fever and unexpected weight change.   HENT:  Negative for congestion, dental problem, ear discharge, ear pain, facial swelling, mouth sores, nosebleeds, postnasal drip, rhinorrhea, sinus pressure, sneezing, sore throat, tinnitus, trouble  swallowing and voice change.    Eyes:  Negative for photophobia, pain, discharge, redness and itching.   Respiratory:  Positive for cough. Negative for apnea, chest tightness, shortness of breath and wheezing.    Cardiovascular:  Positive for leg swelling. Negative for chest pain and palpitations.   Gastrointestinal:  Positive for abdominal pain. Negative for abdominal distention, constipation, diarrhea, nausea and vomiting.   Endocrine: Negative for cold intolerance, heat intolerance, polydipsia and polyuria.   Genitourinary:  Negative for decreased urine volume, difficulty urinating, dysuria, flank pain, frequency, hematuria and urgency.   Musculoskeletal:  Positive for arthralgias, back pain, gait problem, joint swelling, myalgias, neck pain and neck stiffness.   Skin:  Negative for pallor, rash and wound.   Allergic/Immunologic: Negative for immunocompromised state.   Neurological:  Negative for dizziness, tremors, numbness and headaches.   Hematological:  Negative for adenopathy. Does not bruise/bleed easily.   Psychiatric/Behavioral:  Negative for sleep disturbance. The patient is not nervous/anxious.         Current Medications:  Current Outpatient Medications   Medication Instructions    armodafiniL (NUVIGIL) 250 mg, Oral, Daily    buPROPion (WELLBUTRIN XL) 300 mg, Oral, Every morning    cevimeline (EVOXAC) 30 mg, Oral, 3 times daily    cholecalciferol, vitamin D3, 5,000 unit Tab Take by mouth.    clobetasol 0.05% (TEMOVATE) 0.05 % Oint Topical (Top), 2 times daily    co-enzyme Q-10 30 mg    cyanocobalamin 1,000 mcg/mL injection INJECT 1 ML UNDER THE SKIN EVERY WEEK AS DIRECTED    estrogen, conjugated,-medroxyprogesterone (PREMPRO) 0.625-5 mg per tablet 1 tablet, Every morning    fluticasone propionate (FLONASE) 50 mcg/actuation nasal spray 2 sprays    folic acid (FOLVITE) 1 MG tablet TAKE ONE TABLET BY MOUTH DAILY    levothyroxine (TIROSINT) 125 mcg, Oral, Daily    LIDOcaine (LIDODERM) 5 % 1 patch,  "Transdermal, Daily, Remove & Discard patch within 12 hours or as directed by MD    liothyronine (CYTOMEL) 5 MCG Tab TAKE ONE TABLET BY MOUTH TWICE DAILY    methylPREDNISolone (MEDROL) 4 MG Tab TAKE 2 TABLETS BY MOUTH DAILY    norethindrone-ethinyl estradiol (FEMHRT 1/5) 1-5 mg-mcg Tab No dose, route, or frequency recorded.    OTEZLA 30 mg, Oral, 2 times daily    OZEMPIC 0.5 mg, Every 7 days    OZEMPIC 1 mg, Subcutaneous, Every 7 days    pantoprazole (PROTONIX) 40 mg, Daily    terazosin (HYTRIN) 2 mg    tiZANidine (ZANAFLEX) 4 mg, Oral, Every 8 hours PRN    zolpidem (AMBIEN) 5 mg, Oral, Nightly PRN         Objective:     Vitals:    03/31/25 0933   BP: 116/75   Pulse: 101   Weight: 102 kg (224 lb 13.9 oz)   Height: 5' 7.01" (1.702 m)   PainSc:   6   PainLoc: Generalized      Body mass index is 35.21 kg/m².     Physical Examinations:  Physical Exam   Constitutional: She is oriented to person, place, and time. No distress.   HENT:   Head: Normocephalic and atraumatic.   Eyes: Pupils are equal, round, and reactive to light. Right eye exhibits no discharge. Left eye exhibits no discharge.   Neck: No thyromegaly present.   Cardiovascular: Normal rate, regular rhythm and normal heart sounds. Exam reveals no gallop and no friction rub.   No murmur heard.  Pulmonary/Chest: Breath sounds normal. She has no wheezes. She has no rales. She exhibits no tenderness.   Abdominal: There is no abdominal tenderness. There is no rebound and no guarding.   Musculoskeletal:         General: Tenderness present.      Right shoulder: Tenderness present.      Left shoulder: Tenderness present.      Right elbow: Normal.      Left elbow: Tenderness present.      Right wrist: Tenderness present.      Left wrist: Tenderness present.      Cervical back: Neck supple.      Right knee: Effusion present. Tenderness present.      Left knee: Effusion present. Tenderness present.   Lymphadenopathy:     She has no cervical adenopathy.   Neurological: She " is alert and oriented to person, place, and time. Gait normal.   Skin: Skin is dry. No rash noted. No erythema. There is pallor.   Psychiatric: Mood and affect normal.   Vitals reviewed.      Right Side Rheumatological Exam     Examination finds the elbow normal.    The patient is tender to palpation of the shoulder, wrist, knee, 1st PIP, 1st MCP, 2nd PIP, 2nd MCP, 3rd PIP, 3rd MCP, 4th PIP, 4th MCP, 5th PIP and 5th MCP    She has swelling of the 1st PIP, 1st MCP, 2nd PIP, 2nd MCP, 3rd PIP, 3rd MCP, 4th PIP, 4th MCP, 5th PIP and 5th MCP    Shoulder Exam   Tenderness Location: no tenderness    Range of Motion   Active abduction:  abnormal   Adduction: abnormal  Sensation: normal    Knee Exam   Patellofemoral Crepitus: positive  Effusion: positive  Sensation: normal    Hip Exam   Tenderness Location: posterior  Sensation: normal    Elbow/Wrist Exam   Tenderness Location: no tenderness  Sensation: normal    Left Side Rheumatological Exam     The patient is tender to palpation of the shoulder, elbow, wrist, knee, 1st PIP, 1st MCP, 2nd PIP, 2nd MCP, 3rd PIP, 3rd MCP, 4th PIP, 4th MCP, 5th PIP and 5th MCP.    She has swelling of the 1st PIP, 1st MCP, 2nd PIP, 2nd MCP, 3rd PIP, 3rd MCP, 4th PIP, 4th MCP, 5th PIP and 5th MCP    Shoulder Exam   Tenderness Location: no tenderness    Range of Motion   Active abduction:  abnormal   Sensation: normal    Knee Exam     Patellofemoral Crepitus: positive  Effusion: positive  Sensation: normal    Hip Exam   Tenderness Location: posterior  Sensation: normal    Elbow/Wrist Exam   Sensation: normal      Back/Neck Exam   General Inspection   Gait: normal            Disease Assessment Scores:  Patient's Global Assessment of arthritis (0-10): 2  Physician's Global Assessment of arthritis (0-10): 2  Number of Tender Joints (0-28): 2  Number of Swollen Joints (0-28): 1         No data to display                Monitoring Lab Results:  Lab Results   Component Value Date    WBC 11.4 (H)  11/14/2023    RBC 4.81 11/14/2023    HGB 13.4 11/14/2023    HCT 41.7 11/14/2023    MCV 86.7 11/14/2023    MCH 27.9 11/14/2023    MCHC 32.1 11/14/2023    RDW 13.1 11/14/2023     11/14/2023        Lab Results   Component Value Date     12/11/2023    K 3.5 12/11/2023     12/11/2023    CO2 25 12/11/2023     12/11/2023    BUN 16 12/11/2023    CREATININE 1.42 (H) 12/11/2023    CALCIUM 9.1 12/11/2023    PROT 7.4 11/14/2023    ALBUMIN 4.2 11/14/2023    BILITOT 0.3 11/14/2023    ALKPHOS 40 01/17/2020    AST 14 11/14/2023    ALT 16 11/14/2023    EGFRNORACEVR 42 (L) 12/11/2023       Lab Results   Component Value Date    SEDRATE 25 11/14/2023    CRP 2.4 11/14/2023        Lab Results   Component Value Date    [id number] 1,737 (H) 10/25/2023      are Everywhere Labs History  Expand All  Collapse All  CHEM PROFILE   [facility]/11/2025  Component 03/11/2025 03/11/2025 03/11/2025 08/28/2024 08/28/2024 10/25/2023 10/25/2023 04/25/2023 04/25/2023 04/26/2022 04/26/2022                   ALT -- 12 -- -- 16 -- 21 -- 13 -- 15 Load older lab results   Glucose 99 -- -- 103 -- 104 High     -- 117 High     -- 148 High     -- Load older lab results   BUN 21 -- -- 24 Abnormal     -- 17 -- 21 -- 28 High     -- Load older lab results   Creatinine, Ser -- -- -- -- -- 1.56 High     -- 1.36 High     -- 1.05 -- Load older lab results   Calcium -- -- 9.5 -- -- 9.1 -- 9.0 -- 9.3 -- Load older lab results   Sodium 140 -- -- -- -- 140 -- 142 -- 138 -- Load older lab results   Chloride -- -- -- -- -- 103.0 -- 105.0 -- 105.0 -- Load older lab results   Potassium 4.1 -- -- -- -- 3.50 Low     -- 3.90 -- 3.70 -- Load older lab results   CO2 Total -- -- -- -- -- 27 -- 26 -- 23 -- Load older lab results   eGFR Non- -- -- -- -- -- -- -- -- -- -- -- Load older lab results   eGFR  -- -- -- -- -- -- -- -- -- -- -- Load older lab  results   Creatinine 1.19 Abnormal     -- -- 1.47 Abnormal     -- -- -- -- -- -- -- Load older lab results   EGFR -- -- -- -- -- 38 -- 45 -- 61 --    Alkaline Phosphatase -- -- -- -- -- -- -- -- -- -- -- Load older lab results   AST --                      Imaging: DEXA, Xrays, MRIs, CTs, etc    Old & Outside Medical Records:  Reviewed old and all outside medical records available in Care Everywhere     Assessment:     Encounter Diagnoses   Name Primary?    Type 2 diabetes mellitus with other specified complication, without long-term current use of insulin Yes    Renal disease     Stage 3a chronic kidney disease     Hypersomnia with sleep apnea     Sleep apnea, unspecified type     Sjogren's syndrome, with unspecified organ involvement     PSA (psoriatic arthritis)     Chronic fatigue       Plan:      Encounter Diagnoses   Name Primary?    Type 2 diabetes mellitus with other specified complication, without long-term current use of insulin Yes    Renal disease     Stage 3a chronic kidney disease     Hypersomnia with sleep apnea     Sleep apnea, unspecified type     Sjogren's syndrome, with unspecified organ involvement     PSA (psoriatic arthritis)     Chronic fatigue    Bridal was seen today for disease management.    Diagnoses and all orders for this visit:    Type 2 diabetes mellitus with other specified complication, without long-term current use of insulin  -     Sedimentation rate; Future  -     C-Reactive Protein; Future  -     Comprehensive Metabolic Panel; Future  -     CBC Auto Differential; Future  -     Sedimentation rate  -     C-Reactive Protein  -     Comprehensive Metabolic Panel  -     CBC Auto Differential  -     Sedimentation rate; Future  -     C-Reactive Protein; Future  -     Comprehensive Metabolic Panel; Future  -     CBC Auto Differential; Future  -     Sedimentation rate  -     C-Reactive Protein  -     Comprehensive Metabolic Panel  -     CBC Auto Differential    Renal disease  -      Sedimentation rate; Future  -     C-Reactive Protein; Future  -     Comprehensive Metabolic Panel; Future  -     CBC Auto Differential; Future  -     Sedimentation rate  -     C-Reactive Protein  -     Comprehensive Metabolic Panel  -     CBC Auto Differential  -     Sedimentation rate; Future  -     C-Reactive Protein; Future  -     Comprehensive Metabolic Panel; Future  -     CBC Auto Differential; Future  -     Sedimentation rate  -     C-Reactive Protein  -     Comprehensive Metabolic Panel  -     CBC Auto Differential  -     Follicle Stimulating Hormone; Future  -     Luteinizing Hormone; Future  -     DHEA; Future  -     Testosterone; Future  -     Progesterone; Future  -     Estrogens, Total; Future  -     Follicle Stimulating Hormone  -     Luteinizing Hormone  -     DHEA  -     Testosterone  -     Progesterone  -     Estrogens, Total    Stage 3a chronic kidney disease  -     Sedimentation rate; Future  -     C-Reactive Protein; Future  -     Comprehensive Metabolic Panel; Future  -     CBC Auto Differential; Future  -     Sedimentation rate  -     C-Reactive Protein  -     Comprehensive Metabolic Panel  -     CBC Auto Differential  -     Sedimentation rate; Future  -     C-Reactive Protein; Future  -     Comprehensive Metabolic Panel; Future  -     CBC Auto Differential; Future  -     Sedimentation rate  -     C-Reactive Protein  -     Comprehensive Metabolic Panel  -     CBC Auto Differential    Hypersomnia with sleep apnea  -     armodafiniL (NUVIGIL) 250 mg tablet; Take 1 tablet (250 mg total) by mouth once daily.  -     HME - OTHER  -     Follicle Stimulating Hormone; Future  -     Luteinizing Hormone; Future  -     DHEA; Future  -     Testosterone; Future  -     Progesterone; Future  -     Estrogens, Total; Future  -     Follicle Stimulating Hormone  -     Luteinizing Hormone  -     DHEA  -     Testosterone  -     Progesterone  -     Estrogens, Total  -     buPROPion (WELLBUTRIN XL) 150 MG TB24 tablet;  Take 2 tablets (300 mg total) by mouth every morning.    Sleep apnea, unspecified type  -     HME - OTHER  -     Follicle Stimulating Hormone; Future  -     Luteinizing Hormone; Future  -     DHEA; Future  -     Testosterone; Future  -     Progesterone; Future  -     Estrogens, Total; Future  -     Follicle Stimulating Hormone  -     Luteinizing Hormone  -     DHEA  -     Testosterone  -     Progesterone  -     Estrogens, Total    Sjogren's syndrome, with unspecified organ involvement  -     tiZANidine (ZANAFLEX) 4 MG tablet; Take 1 tablet (4 mg total) by mouth every 8 (eight) hours as needed (back pain).  -     methylPREDNISolone (MEDROL) 4 MG Tab; TAKE 2 TABLETS BY MOUTH DAILY  -     dexAMETHasone injection 8 mg  -     cyanocobalamin injection 1,000 mcg  -     Follicle Stimulating Hormone; Future  -     Luteinizing Hormone; Future  -     DHEA; Future  -     Testosterone; Future  -     Progesterone; Future  -     Estrogens, Total; Future  -     Follicle Stimulating Hormone  -     Luteinizing Hormone  -     DHEA  -     Testosterone  -     Progesterone  -     Estrogens, Total    PSA (psoriatic arthritis)  -     tiZANidine (ZANAFLEX) 4 MG tablet; Take 1 tablet (4 mg total) by mouth every 8 (eight) hours as needed (back pain).  -     methylPREDNISolone (MEDROL) 4 MG Tab; TAKE 2 TABLETS BY MOUTH DAILY  -     dexAMETHasone injection 8 mg  -     cyanocobalamin injection 1,000 mcg  -     Follicle Stimulating Hormone; Future  -     Luteinizing Hormone; Future  -     DHEA; Future  -     Testosterone; Future  -     Progesterone; Future  -     Estrogens, Total; Future  -     Follicle Stimulating Hormone  -     Luteinizing Hormone  -     DHEA  -     Testosterone  -     Progesterone  -     Estrogens, Total    Chronic fatigue  -     buPROPion (WELLBUTRIN XL) 150 MG TB24 tablet; Take 2 tablets (300 mg total) by mouth every morning.        1. Continue otezla  2. Nurse injection  3.  nuvigil fatigue  trial sakshis for sleep apnea in  [facility] wellbutrin     Follow-up 6 months    More than 50% of the  30 minute encounter was spent face to face counseling the patient regarding current status and future plan of care as well as side effects  of the medications. All questions were answered to patient's satisfaction also includes  non-face to face time preparing to see the patient (eg, review of tests), Obtaining and/or reviewing separately obtained history, Documenting clinical information in the electronic or other health record, Independently interpreting results           [1]   Social History  Tobacco Use    Smoking status: Former     Current packs/day: 0.00     Types: Cigarettes     Quit date: 2001     Years since quittin.2   Substance Use Topics    Alcohol use: No     Alcohol/week: 0.0 standard drinks of alcohol    Drug use: No

## 2025-05-07 DIAGNOSIS — L40.50 ARTHROPATHIC PSORIASIS, UNSPECIFIED: ICD-10-CM

## 2025-05-09 RX ORDER — APREMILAST 30 MG/1
30 TABLET, FILM COATED ORAL 2 TIMES DAILY
Qty: 60 TABLET | Refills: 11 | Status: ACTIVE | OUTPATIENT
Start: 2025-05-09

## 2025-05-19 DIAGNOSIS — M35.00 SJOGREN'S SYNDROME, WITH UNSPECIFIED ORGAN INVOLVEMENT: ICD-10-CM

## 2025-05-19 DIAGNOSIS — L40.50 PSA (PSORIATIC ARTHRITIS): ICD-10-CM

## 2025-05-19 NOTE — TELEPHONE ENCOUNTER
Pharmacy requesting refill on Tizanidine 4mg  Pt's LOV 03/31/2025  Pt's NOV 11/13/2025  Medication pending

## 2025-05-20 RX ORDER — TIZANIDINE 4 MG/1
4 TABLET ORAL EVERY 8 HOURS PRN
Qty: 270 TABLET | Refills: 1 | Status: SHIPPED | OUTPATIENT
Start: 2025-05-20 | End: 2026-05-20

## 2025-07-11 ENCOUNTER — PATIENT MESSAGE (OUTPATIENT)
Dept: ADMINISTRATIVE | Facility: OTHER | Age: 60
End: 2025-07-11
Payer: COMMERCIAL

## 2025-08-10 DIAGNOSIS — G47.30 HYPERSOMNIA WITH SLEEP APNEA: ICD-10-CM

## 2025-08-10 DIAGNOSIS — G47.10 HYPERSOMNIA WITH SLEEP APNEA: ICD-10-CM

## 2025-08-14 RX ORDER — ARMODAFINIL 250 MG/1
TABLET ORAL
Qty: 30 TABLET | Refills: 4 | Status: SHIPPED | OUTPATIENT
Start: 2025-08-14